# Patient Record
Sex: FEMALE | Race: WHITE | NOT HISPANIC OR LATINO | Employment: OTHER | URBAN - METROPOLITAN AREA
[De-identification: names, ages, dates, MRNs, and addresses within clinical notes are randomized per-mention and may not be internally consistent; named-entity substitution may affect disease eponyms.]

---

## 2021-02-12 ENCOUNTER — IMMUNIZATIONS (OUTPATIENT)
Dept: FAMILY MEDICINE CLINIC | Facility: HOSPITAL | Age: 86
End: 2021-02-12

## 2021-02-12 DIAGNOSIS — Z23 ENCOUNTER FOR IMMUNIZATION: Primary | ICD-10-CM

## 2021-02-12 PROCEDURE — 0011A SARS-COV-2 / COVID-19 MRNA VACCINE (MODERNA) 100 MCG: CPT

## 2021-02-12 PROCEDURE — 91301 SARS-COV-2 / COVID-19 MRNA VACCINE (MODERNA) 100 MCG: CPT

## 2021-03-10 ENCOUNTER — IMMUNIZATIONS (OUTPATIENT)
Dept: FAMILY MEDICINE CLINIC | Facility: HOSPITAL | Age: 86
End: 2021-03-10

## 2021-03-10 DIAGNOSIS — Z23 ENCOUNTER FOR IMMUNIZATION: Primary | ICD-10-CM

## 2021-03-10 PROCEDURE — 91301 SARS-COV-2 / COVID-19 MRNA VACCINE (MODERNA) 100 MCG: CPT

## 2021-03-10 PROCEDURE — 0012A SARS-COV-2 / COVID-19 MRNA VACCINE (MODERNA) 100 MCG: CPT

## 2021-06-07 ENCOUNTER — APPOINTMENT (EMERGENCY)
Dept: RADIOLOGY | Facility: HOSPITAL | Age: 86
End: 2021-06-07
Payer: MEDICARE

## 2021-06-07 ENCOUNTER — APPOINTMENT (EMERGENCY)
Dept: CT IMAGING | Facility: HOSPITAL | Age: 86
End: 2021-06-07
Payer: MEDICARE

## 2021-06-07 ENCOUNTER — HOSPITAL ENCOUNTER (EMERGENCY)
Facility: HOSPITAL | Age: 86
Discharge: HOME | End: 2021-06-07
Attending: EMERGENCY MEDICINE | Admitting: EMERGENCY MEDICINE
Payer: MEDICARE

## 2021-06-07 VITALS
OXYGEN SATURATION: 98 % | RESPIRATION RATE: 15 BRPM | SYSTOLIC BLOOD PRESSURE: 158 MMHG | TEMPERATURE: 98.6 F | HEIGHT: 57 IN | BODY MASS INDEX: 25.45 KG/M2 | WEIGHT: 117.95 LBS | DIASTOLIC BLOOD PRESSURE: 78 MMHG | HEART RATE: 73 BPM

## 2021-06-07 DIAGNOSIS — R10.9 FLANK PAIN: Primary | ICD-10-CM

## 2021-06-07 LAB
ALBUMIN SERPL BCP-MCNC: 3.3 G/DL (ref 3.5–5)
ALP SERPL-CCNC: 105 U/L (ref 46–116)
ALT SERPL W P-5'-P-CCNC: 24 U/L (ref 12–78)
ANION GAP SERPL CALCULATED.3IONS-SCNC: 10 MMOL/L (ref 4–13)
AST SERPL W P-5'-P-CCNC: 28 U/L (ref 5–45)
BASOPHILS # BLD AUTO: 0.02 THOUSANDS/ΜL (ref 0–0.1)
BASOPHILS NFR BLD AUTO: 0 % (ref 0–1)
BILIRUB SERPL-MCNC: 0.44 MG/DL (ref 0.2–1)
BILIRUB UR QL STRIP: NEGATIVE
BUN SERPL-MCNC: 50 MG/DL (ref 5–25)
CALCIUM ALBUM COR SERPL-MCNC: 11.3 MG/DL (ref 8.3–10.1)
CALCIUM SERPL-MCNC: 10.7 MG/DL (ref 8.3–10.1)
CHLORIDE SERPL-SCNC: 101 MMOL/L (ref 100–108)
CLARITY UR: NORMAL
CO2 SERPL-SCNC: 26 MMOL/L (ref 21–32)
COLOR UR: NORMAL
CREAT SERPL-MCNC: 1.28 MG/DL (ref 0.6–1.3)
EOSINOPHIL # BLD AUTO: 0 THOUSAND/ΜL (ref 0–0.61)
EOSINOPHIL NFR BLD AUTO: 0 % (ref 0–6)
ERYTHROCYTE [DISTWIDTH] IN BLOOD BY AUTOMATED COUNT: 13.7 % (ref 11.6–15.1)
GFR SERPL CREATININE-BSD FRML MDRD: 37 ML/MIN/1.73SQ M
GLUCOSE SERPL-MCNC: 88 MG/DL (ref 65–140)
GLUCOSE UR STRIP-MCNC: NEGATIVE MG/DL
HCT VFR BLD AUTO: 42.1 % (ref 34.8–46.1)
HGB BLD-MCNC: 14.1 G/DL (ref 11.5–15.4)
HGB UR QL STRIP.AUTO: NEGATIVE
IMM GRANULOCYTES # BLD AUTO: 0.11 THOUSAND/UL (ref 0–0.2)
IMM GRANULOCYTES NFR BLD AUTO: 1 % (ref 0–2)
KETONES UR STRIP-MCNC: NEGATIVE MG/DL
LEUKOCYTE ESTERASE UR QL STRIP: NEGATIVE
LIPASE SERPL-CCNC: 59 U/L (ref 73–393)
LYMPHOCYTES # BLD AUTO: 1.46 THOUSANDS/ΜL (ref 0.6–4.47)
LYMPHOCYTES NFR BLD AUTO: 10 % (ref 14–44)
MAGNESIUM SERPL-MCNC: 2.2 MG/DL (ref 1.6–2.6)
MCH RBC QN AUTO: 32.2 PG (ref 26.8–34.3)
MCHC RBC AUTO-ENTMCNC: 33.5 G/DL (ref 31.4–37.4)
MCV RBC AUTO: 96 FL (ref 82–98)
MONOCYTES # BLD AUTO: 2.34 THOUSAND/ΜL (ref 0.17–1.22)
MONOCYTES NFR BLD AUTO: 15 % (ref 4–12)
NEUTROPHILS # BLD AUTO: 11.5 THOUSANDS/ΜL (ref 1.85–7.62)
NEUTS SEG NFR BLD AUTO: 74 % (ref 43–75)
NITRITE UR QL STRIP: NEGATIVE
NRBC BLD AUTO-RTO: 0 /100 WBCS
NT-PROBNP SERPL-MCNC: 1148 PG/ML
PH UR STRIP.AUTO: 6.5 [PH]
PLATELET # BLD AUTO: 284 THOUSANDS/UL (ref 149–390)
PMV BLD AUTO: 10.8 FL (ref 8.9–12.7)
POTASSIUM SERPL-SCNC: 3.6 MMOL/L (ref 3.5–5.3)
PROT SERPL-MCNC: 8.1 G/DL (ref 6.4–8.2)
PROT UR STRIP-MCNC: NEGATIVE MG/DL
RBC # BLD AUTO: 4.38 MILLION/UL (ref 3.81–5.12)
SODIUM SERPL-SCNC: 137 MMOL/L (ref 136–145)
SP GR UR STRIP.AUTO: <=1.005 (ref 1–1.03)
TROPONIN I SERPL-MCNC: <0.02 NG/ML
UROBILINOGEN UR QL STRIP.AUTO: 0.2 E.U./DL
WBC # BLD AUTO: 15.43 THOUSAND/UL (ref 4.31–10.16)

## 2021-06-07 PROCEDURE — 80053 COMPREHEN METABOLIC PANEL: CPT | Performed by: EMERGENCY MEDICINE

## 2021-06-07 PROCEDURE — 74177 CT ABD & PELVIS W/CONTRAST: CPT

## 2021-06-07 PROCEDURE — 96374 THER/PROPH/DIAG INJ IV PUSH: CPT

## 2021-06-07 PROCEDURE — 99284 EMERGENCY DEPT VISIT MOD MDM: CPT

## 2021-06-07 PROCEDURE — 83735 ASSAY OF MAGNESIUM: CPT | Performed by: EMERGENCY MEDICINE

## 2021-06-07 PROCEDURE — 83880 ASSAY OF NATRIURETIC PEPTIDE: CPT | Performed by: EMERGENCY MEDICINE

## 2021-06-07 PROCEDURE — 99284 EMERGENCY DEPT VISIT MOD MDM: CPT | Performed by: EMERGENCY MEDICINE

## 2021-06-07 PROCEDURE — 85025 COMPLETE CBC W/AUTO DIFF WBC: CPT | Performed by: EMERGENCY MEDICINE

## 2021-06-07 PROCEDURE — 84484 ASSAY OF TROPONIN QUANT: CPT | Performed by: EMERGENCY MEDICINE

## 2021-06-07 PROCEDURE — 83690 ASSAY OF LIPASE: CPT | Performed by: EMERGENCY MEDICINE

## 2021-06-07 PROCEDURE — 81003 URINALYSIS AUTO W/O SCOPE: CPT | Performed by: EMERGENCY MEDICINE

## 2021-06-07 PROCEDURE — 36415 COLL VENOUS BLD VENIPUNCTURE: CPT | Performed by: EMERGENCY MEDICINE

## 2021-06-07 PROCEDURE — 71045 X-RAY EXAM CHEST 1 VIEW: CPT

## 2021-06-07 RX ORDER — AMOXICILLIN 250 MG
1 CAPSULE ORAL DAILY
Qty: 20 TABLET | Refills: 0 | Status: SHIPPED | OUTPATIENT
Start: 2021-06-07 | End: 2021-07-12

## 2021-06-07 RX ORDER — DOCUSATE SODIUM 100 MG/1
100 CAPSULE, LIQUID FILLED ORAL EVERY 12 HOURS
Qty: 60 CAPSULE | Refills: 0 | Status: SHIPPED | OUTPATIENT
Start: 2021-06-07 | End: 2021-07-12

## 2021-06-07 RX ORDER — MORPHINE SULFATE 4 MG/ML
4 INJECTION, SOLUTION INTRAMUSCULAR; INTRAVENOUS ONCE
Status: COMPLETED | OUTPATIENT
Start: 2021-06-07 | End: 2021-06-07

## 2021-06-07 RX ADMIN — MORPHINE SULFATE 4 MG: 4 INJECTION INTRAVENOUS at 12:52

## 2021-06-07 RX ADMIN — IOHEXOL 100 ML: 350 INJECTION, SOLUTION INTRAVENOUS at 13:58

## 2021-06-07 NOTE — ED PROVIDER NOTES
History  Chief Complaint   Patient presents with    Back Pain     started 3 days ago when removing a pillow from the couch     Leg Swelling     edema on b/l LL started a couple weeks ago     Patient is an 20-year-old female past medical history of hypertension, CKD stage 3, depression presenting with flank pain  Patient notes the last 5 days she has had constant and worsening nonradiating flank pain  She cannot describe the nature of a but states that it is severe  States that is worse with movement worse with going over bumps, has not resolved with use of tramadol and Tylenol  She states it began while moving a pillow on the couch  Denies any falls or injuries that she is aware of  She also notes bilateral leg edema and states that it started on the right and is now in both legs over the last several days  She notes constipation but states that she moved her bowels this morning uses MiraLax daily  She denies any dysuria, hematuria, urinary frequency, urgency, nausea/vomiting/diarrhea, fevers, chest pain, shortness breath, dizziness, numbness or tingling, lower extremity weakness  None       History reviewed  No pertinent past medical history  History reviewed  No pertinent surgical history  History reviewed  No pertinent family history  I have reviewed and agree with the history as documented  E-Cigarette/Vaping     E-Cigarette/Vaping Substances     Social History     Tobacco Use    Smoking status: Never Smoker    Smokeless tobacco: Never Used   Substance Use Topics    Alcohol use: Never     Frequency: Never     Binge frequency: Never    Drug use: Never       Review of Systems   All other systems reviewed and are negative  Physical Exam  Physical Exam  Vitals signs reviewed  Constitutional:       General: She is not in acute distress  Appearance: Normal appearance  She is not ill-appearing  HENT:      Mouth/Throat:      Mouth: Mucous membranes are dry     Eyes: Conjunctiva/sclera: Conjunctivae normal    Neck:      Musculoskeletal: Neck supple  Cardiovascular:      Rate and Rhythm: Normal rate and regular rhythm  Heart sounds: Normal heart sounds  Pulmonary:      Effort: Pulmonary effort is normal       Breath sounds: Normal breath sounds  Abdominal:      General: Abdomen is flat  Palpations: Abdomen is soft  Tenderness: There is abdominal tenderness  There is right CVA tenderness and guarding  Comments: Guarding with deep palpation of the bilateral upper quadrants and epigastrium   Musculoskeletal: Normal range of motion  General: Swelling present  Comments: No spinal tenderness to the cervical, thoracic, lumbar spines trace to +1 pitting edema to the lower extremities bilaterally   Skin:     General: Skin is warm and dry  Neurological:      General: No focal deficit present  Mental Status: She is alert     Psychiatric:         Mood and Affect: Mood normal          Vital Signs  ED Triage Vitals [06/07/21 1209]   Temperature Pulse Respirations Blood Pressure SpO2   98 6 °F (37 °C) 81 20 (!) 181/87 97 %      Temp Source Heart Rate Source Patient Position - Orthostatic VS BP Location FiO2 (%)   Oral Monitor Sitting Right arm --      Pain Score       7           Vitals:    06/07/21 1209 06/07/21 1315   BP: (!) 181/87 158/78   Pulse: 81 73   Patient Position - Orthostatic VS: Sitting Lying         Visual Acuity      ED Medications  Medications   morphine (PF) 4 mg/mL injection 4 mg (4 mg Intravenous Given 6/7/21 1252)   iohexol (OMNIPAQUE) 350 MG/ML injection (SINGLE-DOSE) 100 mL (100 mL Intravenous Given 6/7/21 1358)       Diagnostic Studies  Results Reviewed     Procedure Component Value Units Date/Time    UA w Reflex to Microscopic w Reflex to Culture [[id number]] Collected: 06/07/21 1539    Lab Status: Final result Specimen: Urine, Clean Catch Updated: 06/07/21 5426     Color, UA Light Yellow     Clarity, UA Slightly Cloudy Specific Gravity, UA <=1 005     pH, UA 6 5     Leukocytes, UA Negative     Nitrite, UA Negative     Protein, UA Negative mg/dl      Glucose, UA Negative mg/dl      Ketones, UA Negative mg/dl      Urobilinogen, UA 0 2 E U /dl      Bilirubin, UA Negative     Blood, UA Negative    Lipase [[id number]]  (Abnormal) Collected: 06/07/21 1252    Lab Status: Final result Specimen: Blood from Arm, Left Updated: 06/07/21 1330     Lipase 59 u/L     NT-BNP PRO [[id number]]  (Abnormal) Collected: 06/07/21 1252    Lab Status: Final result Specimen: Blood from Arm, Left Updated: 06/07/21 1330     NT-proBNP 1,148 pg/mL     Magnesium [[id number]]  (Normal) Collected: 06/07/21 1252    Lab Status: Final result Specimen: Blood from Arm, Left Updated: 06/07/21 1330     Magnesium 2 2 mg/dL     Troponin I [[id number]]  (Normal) Collected: 06/07/21 1252    Lab Status: Final result Specimen: Blood from Arm, Left Updated: 06/07/21 1324     Troponin I <0 02 ng/mL     Comprehensive metabolic panel [[id number]]  (Abnormal) Collected: 06/07/21 1252    Lab Status: Final result Specimen: Blood from Arm, Left Updated: 06/07/21 1322     Sodium 137 mmol/L      Potassium 3 6 mmol/L      Chloride 101 mmol/L      CO2 26 mmol/L      ANION GAP 10 mmol/L      BUN 50 mg/dL      Creatinine 1 28 mg/dL      Glucose 88 mg/dL      Calcium 10 7 mg/dL      Corrected Calcium 11 3 mg/dL      AST 28 U/L      ALT 24 U/L      Alkaline Phosphatase 105 U/L      Total Protein 8 1 g/dL      Albumin 3 3 g/dL      Total Bilirubin 0 44 mg/dL      eGFR 37 ml/min/1 73sq m     [facility]:      [employer] guidelines for Chronic Kidney Disease (CKD):     Stage 1 with normal or high GFR (GFR > 90 mL/min/1 73 square meters)    Stage 2 Mild CKD (GFR = 60-89 mL/min/1 73 square meters)    Stage 3A Moderate CKD (GFR = 45-59 mL/min/1 73 square meters)    Stage 3B Moderate CKD (GFR = 30-44 mL/min/1 73 square meters)    Stage 4 Severe CKD (GFR = 15-29 mL/min/1 73 square meters)    Stage 5 End Stage CKD (GFR <15 mL/min/1 73 square meters)  Note: GFR calculation is accurate only with a steady state creatinine    CBC and differential [[id number]]  (Abnormal) Collected: 06/07/21 1252    Lab Status: Final result Specimen: Blood from Arm, Left Updated: 06/07/21 1259     WBC 15 43 Thousand/uL      RBC 4 38 Million/uL      Hemoglobin 14 1 g/dL      Hematocrit 42 1 %      MCV 96 fL      MCH 32 2 pg      MCHC 33 5 g/dL      RDW 13 7 %      MPV 10 8 fL      Platelets 572 Thousands/uL      nRBC 0 /100 WBCs      Neutrophils Relative 74 %      Immat GRANS % 1 %      Lymphocytes Relative 10 %      Monocytes Relative 15 %      Eosinophils Relative 0 %      Basophils Relative 0 %      Neutrophils Absolute 11 50 Thousands/µL      Immature Grans Absolute 0 11 Thousand/uL      Lymphocytes Absolute 1 46 Thousands/µL      Monocytes Absolute 2 34 Thousand/µL      Eosinophils Absolute 0 00 Thousand/µL      Basophils Absolute 0 02 Thousands/µL                  CT abdomen pelvis with contrast   Final Result by Alvarado Hernandes MD (06/07 0112)      1  No acute inflammatory process in the abdomen or pelvis  2   Liquid stool and throughout portions of the colon without obstruction or wall thickening  Correlation for diarrhea illness  3   Fistulous tracts between loops of sigmoid colon and rectum without acute inflammatory changes  This may represent the sequela of prior diverticulitis or inflammatory bowel disease  4   Small hiatal hernia              Workstation performed: [id number]         XR chest 1 view portable   ED Interpretation by Sandra Ruiz DO (06/07 1600)   NAD                 Procedures  ECG 12 Lead Documentation Only    Date/Time: 6/7/2021 1:13 PM  Performed by: Sandra Ruiz DO  Authorized by: Sandra Ruiz DO     ECG reviewed by me, the ED Provider: yes    Patient location:  ED  Previous ECG:     Previous ECG:  Unavailable  Interpretation: Interpretation: normal    Rate:     ECG rate assessment: normal    Rhythm:     Rhythm: sinus rhythm    Ectopy:     Ectopy: none    QRS:     QRS axis:  Normal    QRS intervals:  Normal  Conduction:     Conduction: normal    ST segments:     ST segments:  Normal  T waves:     T waves: normal               ED Course  ED Course as of Jun 07 1620   Mon Jun 07, 2021   1555 Urinalysis unremarkable, CT shows  constipation and rectosigmoid fistula, will discuss with surgery      1559 Patient does have elevated BNP but has history of heart failure, reports no chest pain or shortness of breath, has only mild lower extremity edema, and normal vital signs  Have advised PCP follow-up and Cardiology follow-up for this and patient states she understands and agrees  80 Have discussed with surgery who recommends outpatient colonoscopy and follow-up with them as needed after colonoscopy  Have discussed with patient return precautions worsened or changed pain, uncontrollable vomiting, fevers, lightheadedness or passing out and she states that she understands  Will give bowel regimen gastroenterology, PCP follow-up  Have discussed with patient that she should return to the ER if her symptoms are worsening and must follow-up with her primary care this week as she does not have a diagnosis explaining her right flank pain  Identification of Seniors at Risk      Most Recent Value   (ISAR) Identification of Seniors at Risk   Before the illness or injury that brought you to the Emergency, did you need someone to help you on a regular basis? 0 Filed at: 06/07/2021 1213   In the last 24 hours, have you needed more help than usual?  0 Filed at: 06/07/2021 1213   Have you been hospitalized for one or more nights during the past 6 months? 0 Filed at: 06/07/2021 1213   In general, do you see well?  0 Filed at: 06/07/2021 1213   In general, do you have serious problems with your memory?   0 Filed at: 06/07/2021 1213   Do you take more than three different medications every day? 1 Filed at: 06/07/2021 1213   ISAR Score  1 Filed at: 06/07/2021 1213                    SBIRT 20yo+      Most Recent Value   SBIRT (25 yo +)   In order to provide better care to our patients, we are screening all of our patients for alcohol and drug use  Would it be okay to ask you these screening questions? Yes Filed at: 06/07/2021 1213   Initial Alcohol Screen: US AUDIT-C    1  How often do you have a drink containing alcohol?  0 Filed at: 06/07/2021 1213   2  How many drinks containing alcohol do you have on a typical day you are drinking? 0 Filed at: 06/07/2021 1213   3a  Male UNDER 65: How often do you have five or more drinks on one occasion? 0 Filed at: 06/07/2021 1213   3b  FEMALE Any Age, or MALE 65+: How often do you have 4 or more drinks on one occassion? 0 Filed at: 06/07/2021 1213   Audit-C Score  0 Filed at: 06/07/2021 1213   BARRETT: How many times in the past year have you    Used an illegal drug or used a prescription medication for non-medical reasons? Never Filed at: 06/07/2021 1213                    MDM  Number of Diagnoses or Management Options  Diagnosis management comments: Patient is an 30-year-old female past medical history of CKD stage 3, hypertension, depression presenting with back pain  Patient is well-appearing bedside with stable vitals and in no acute distress  She is hypertensive to 181/87 but notes noncompliance with her blood pressure medication today and does not note any symptoms of hypertensive emergency  She does have reproducible right-sided CVA tenderness as well as abdominal tenderness but no spinal tenderness and denies any lower extremity weakness, numbness or radiation of pain  Will obtain urinalysis, labs, CT abdomen pelvis and administer pain control  Will also obtain cardiac workup in the setting of bilateral leg edema        Disposition  Final diagnoses:   Flank pain     Time reflects when diagnosis was documented in both MDM as applicable and the Disposition within this note     Time User Action Codes Description Comment    6/7/2021  4:18 PM Kelby Sanders Add [R10 9] Flank pain       ED Disposition     ED Disposition Condition Date/Time Comment    Discharge Stable Mon Jun 7, 2021  4:18 PM Jovanna Singer discharge to home/self care  Follow-up Information     Follow up With Specialties Details Why Contact Info Additional Information    Ladonna Garcia MD  In 1 week  [address] [facility] [id number] [place] [address] [facility] Gastroenterology Schedule an appointment as soon as possible for a visit in 1 week  [address]  [address]  [address] [facility], [address]  [address], [facility], [place], [address] [phone]          Patient's Medications   Discharge Prescriptions    SENNA-DOCUSATE SODIUM (SENOKOT S) 8 6-50 MG PER TABLET    Take 1 tablet by mouth daily       Start Date: 6/7/2021  End Date: --       Order Dose: 1 tablet       Quantity: 20 tablet    Refills: 0     No discharge procedures on file      PDMP Review     None          ED Provider  Electronically Signed by           Scot Forman DO  06/07/21 4752

## 2021-06-07 NOTE — ED NOTES
Family at bedside  Pt assisted to stand, pivot to wheelchair  Taken to car and assisted in       Risa Antunez RN  06/07/21 9742

## 2021-07-09 RX ORDER — ATORVASTATIN CALCIUM 20 MG/1
TABLET, FILM COATED ORAL
COMMUNITY
Start: 2020-12-12

## 2021-07-09 RX ORDER — ALPRAZOLAM 0.25 MG/1
TABLET ORAL
COMMUNITY
Start: 2021-03-17 | End: 2021-08-26

## 2021-07-09 RX ORDER — SILICONE ADHESIVE 1.5" X 3"
SHEET (EA) TOPICAL
COMMUNITY
Start: 2021-01-13 | End: 2021-07-12

## 2021-07-09 RX ORDER — RANITIDINE 150 MG/1
TABLET ORAL
COMMUNITY
End: 2021-07-12

## 2021-07-09 RX ORDER — ACETAMINOPHEN 325 MG/1
325 TABLET ORAL
COMMUNITY
End: 2021-09-04 | Stop reason: HOSPADM

## 2021-07-09 RX ORDER — TRAMADOL HYDROCHLORIDE 50 MG/1
50 TABLET ORAL
COMMUNITY
Start: 2021-06-04 | End: 2021-09-04 | Stop reason: HOSPADM

## 2021-07-12 ENCOUNTER — OFFICE VISIT (OUTPATIENT)
Dept: GASTROENTEROLOGY | Facility: CLINIC | Age: 86
End: 2021-07-12
Payer: MEDICARE

## 2021-07-12 VITALS
SYSTOLIC BLOOD PRESSURE: 84 MMHG | BODY MASS INDEX: 25.52 KG/M2 | DIASTOLIC BLOOD PRESSURE: 64 MMHG | HEIGHT: 57 IN | HEART RATE: 96 BPM

## 2021-07-12 DIAGNOSIS — K21.9 GASTROESOPHAGEAL REFLUX DISEASE, UNSPECIFIED WHETHER ESOPHAGITIS PRESENT: ICD-10-CM

## 2021-07-12 DIAGNOSIS — K59.04 CHRONIC IDIOPATHIC CONSTIPATION: ICD-10-CM

## 2021-07-12 DIAGNOSIS — R10.84 GENERALIZED ABDOMINAL PAIN: Primary | ICD-10-CM

## 2021-07-12 DIAGNOSIS — R93.5 ABNORMAL CT OF THE ABDOMEN: ICD-10-CM

## 2021-07-12 PROCEDURE — 99204 OFFICE O/P NEW MOD 45 MIN: CPT | Performed by: PHYSICIAN ASSISTANT

## 2021-07-12 RX ORDER — DICYCLOMINE HCL 20 MG
20 TABLET ORAL EVERY 6 HOURS PRN
Qty: 60 TABLET | Refills: 3 | Status: SHIPPED | OUTPATIENT
Start: 2021-07-12 | End: 2021-07-19

## 2021-07-12 RX ORDER — POLYETHYLENE GLYCOL 3350 17 G/17G
17 POWDER, FOR SOLUTION ORAL DAILY
Qty: 30 EACH | Refills: 2 | Status: SHIPPED | OUTPATIENT
Start: 2021-07-12

## 2021-07-12 RX ORDER — PREDNISONE 20 MG/1
TABLET ORAL
COMMUNITY
Start: 2021-06-04 | End: 2021-07-12

## 2021-07-12 RX ORDER — POTASSIUM CHLORIDE 750 MG/1
10 TABLET, FILM COATED, EXTENDED RELEASE ORAL DAILY
COMMUNITY
Start: 2021-07-08

## 2021-07-12 RX ORDER — LOSARTAN POTASSIUM 50 MG/1
50 TABLET ORAL DAILY
COMMUNITY
Start: 2021-05-26

## 2021-07-12 RX ORDER — SENNA PLUS 8.6 MG/1
1 TABLET ORAL
COMMUNITY
End: 2021-07-12

## 2021-07-12 RX ORDER — CYCLOBENZAPRINE HCL 10 MG
TABLET ORAL
COMMUNITY
Start: 2021-06-08 | End: 2021-08-26

## 2021-07-12 RX ORDER — DOCUSATE SODIUM 100 MG/1
CAPSULE, LIQUID FILLED ORAL
Qty: 60 CAPSULE | Refills: 0 | Status: ON HOLD | OUTPATIENT
Start: 2021-07-12 | End: 2021-09-04 | Stop reason: CLARIF

## 2021-07-12 RX ORDER — DICYCLOMINE HCL 20 MG
20 TABLET ORAL EVERY 6 HOURS PRN
Qty: 60 TABLET | Refills: 3 | Status: SHIPPED | OUTPATIENT
Start: 2021-07-12 | End: 2021-07-12 | Stop reason: SDUPTHER

## 2021-07-12 RX ORDER — POLYETHYLENE GLYCOL 3350 17 G/17G
17 POWDER, FOR SOLUTION ORAL DAILY
Qty: 30 EACH | Refills: 2 | Status: SHIPPED | OUTPATIENT
Start: 2021-07-12 | End: 2021-07-12 | Stop reason: SDUPTHER

## 2021-07-12 RX ORDER — SUCRALFATE 1 G/1
TABLET ORAL
COMMUNITY
Start: 2021-06-29

## 2021-07-12 RX ORDER — DOCUSATE SODIUM 100 MG/1
CAPSULE, LIQUID FILLED ORAL
Qty: 60 CAPSULE | Refills: 0 | Status: SHIPPED | OUTPATIENT
Start: 2021-07-12 | End: 2021-07-12 | Stop reason: SDUPTHER

## 2021-07-12 RX ORDER — PANTOPRAZOLE SODIUM 40 MG/1
TABLET, DELAYED RELEASE ORAL
COMMUNITY
Start: 2021-05-29

## 2021-07-12 NOTE — PROGRESS NOTES
[facility] - Outpatient Consultation  Mario Zapata 80 y o  female MRN: [id number]  Encounter: [id number]          ASSESSMENT AND PLAN:      1  Generalized abdominal pain  2  Chronic idiopathic constipation  3  Gastroesophageal reflux disease, unspecified whether esophagitis present  4  Abnormal CT of the abdomen  CT showed liquid stool in the colon, colorectal fistula, rectal distension and diverticulosis without acute inflammatory changes  There was also suggestion of a compression fracture at L1  Will plan a colonoscopy    For her constipation continue colace and add Miralax  Use dicyclomine prn abdominal pain  Continue pantoprazole as it seems to be helping her abdominal pain  F/U with Dr Ivan Holder regarding possible compression fracture  ___________________________________________________________________    HPI:  80-year-old female presents for evaluation of abdominal pain, constipation and abnormal CT scan  The patient developed an acute onset of severe left flank and back pain approximately 3 months ago  She went to the [facility] Emergency Room on June 7th due to the symptom  A CT of the abdomen was performed which suggested liquid stool throughout the colon without obstruction, 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, diverticulosis, and age indeterminate compression fracture of L1 and a small hiatal hernia  She was discharged with instructions to follow up with Gastroenterology  Unfortunately, overnight her pain worsened and she decided to present to the [facility] emergency room  She was admitted to [facility] overnight  It is very unclear exactly what evaluation she had wall there but she was discharged to [facility] where she stayed for 2 weeks to receive physical therapy  After this she was sent back home where she continues with severe back pain  She  Does admit to cramping abdominal pain after eating    She reports that she is not eating well due to this   She has some nausea but no vomiting  She suffers with chronic constipation  She is currently taking Colace without relief  Her  reports that she took a dose of MiraLax several days ago which did produce results however she has not taken a 2nd dose  The patient reports this is because it causes abdominal pain  She was recently prescribed senna which she has been taking without relief  There is no rectal bleeding or diarrhea  She has lost weight but neither her nor her  can not quantify the amount  She reports that her last colonoscopy was approximately 30 years ago  She has known that she has diverticulosis but does not think she was ever diagnosed with diverticulitis  REVIEW OF SYSTEMS:    CONSTITUTIONAL: Denies any fever, chills, rigors, and weight loss  HEENT: No earache or tinnitus  Denies hearing loss or visual disturbances  CARDIOVASCULAR: No chest pain or palpitations  RESPIRATORY: Denies any cough, hemoptysis, shortness of breath or dyspnea on exertion  GASTROINTESTINAL: As noted in the History of Present Illness  GENITOURINARY: No problems with urination  Denies any hematuria or dysuria  NEUROLOGIC: No dizziness or vertigo, denies headaches  MUSCULOSKELETAL: Denies any muscle or joint pain  SKIN: Denies skin rashes or itching  ENDOCRINE: Denies excessive thirst  Denies intolerance to heat or cold  PSYCHOSOCIAL: Denies depression or anxiety  Denies any recent memory loss  Historical Information   History reviewed  No pertinent past medical history    Past Surgical History:   Procedure Laterality Date    APPENDECTOMY      CHOLECYSTECTOMY      INCISION AND DRAINAGE RETROPHYARYNGEAL ABCESS      PELVIC FRACTURE SURGERY      TOTAL HIP ARTHROPLASTY       Social History   Social History     Substance and Sexual Activity   Alcohol Use Never     Social History     Substance and Sexual Activity   Drug Use Never     Social History     Tobacco Use   Smoking Status Never Smoker   Smokeless Tobacco Never Used     History reviewed  No pertinent family history  Meds/Allergies       Current Outpatient Medications:     ALPRAZolam (XANAX) 0 25 mg tablet    atorvastatin (LIPITOR) 20 mg tablet    losartan (COZAAR) 50 mg tablet    pantoprazole (PROTONIX) 40 mg tablet    potassium chloride (Klor-Con) 10 mEq tablet    sucralfate (CARAFATE) 1 g tablet    traMADol (ULTRAM) 50 mg tablet    acetaminophen (TYLENOL) 325 mg tablet    cyclobenzaprine (FLEXERIL) 10 mg tablet    dicyclomine (BENTYL) 20 mg tablet    docusate sodium (COLACE) 100 mg capsule    polyethylene glycol (MIRALAX) 17 g packet    Zoster Vac Recomb Adjuvanted 50 MCG/0 5ML SUSR    Allergies   Allergen Reactions    Aloe Other (See Comments)     Fresh fruit has reaction to   She can have canned fruit    Banana - Food Allergy Other (See Comments)     Itchy throat    Naproxen Diarrhea    Oxycodone-Acetaminophen Hives           Objective     Blood pressure (!) 84/64, pulse 96, height 4' 9" (1 448 m)  Body mass index is 25 52 kg/m²  PHYSICAL EXAM:      General Appearance:   Alert, cooperative, no distress   HEENT:   Normocephalic, atraumatic, anicteric      Neck:  Supple, symmetrical, trachea midline   Lungs:   Clear to auscultation bilaterally; no rales, rhonchi or wheezing; respirations unlabored    Heart[de-identified]   Regular rate and rhythm; no murmur, rub, or gallop  Abdomen:   Soft, non-tender, non-distended; normal bowel sounds; no masses, no organomegaly    Genitalia:   Deferred    Rectal:   Deferred    Extremities:  No cyanosis, clubbing or edema    Pulses:  2+ and symmetric    Skin:  No jaundice, rashes, or lesions    Lymph nodes:  No palpable cervical lymphadenopathy        Lab Results:   No visits with results within 1 Day(s) from this visit     Latest known visit with results is:   Admission on 06/07/2021, Discharged on 06/07/2021   Component Date Value    Sodium 06/07/2021 137     Potassium 06/07/2021 3 6     Chloride 06/07/2021 101     CO2 06/07/2021 26     ANION GAP 06/07/2021 10     BUN 06/07/2021 50*    Creatinine 06/07/2021 1 28     Glucose 06/07/2021 88     Calcium 06/07/2021 10 7*    Corrected Calcium 06/07/2021 11 3*    AST 06/07/2021 28     ALT 06/07/2021 24     Alkaline Phosphatase 06/07/2021 105     Total Protein 06/07/2021 8 1     Albumin 06/07/2021 3 3*    Total Bilirubin 06/07/2021 0 44     eGFR 06/07/2021 37     WBC 06/07/2021 15 43*    RBC 06/07/2021 4 38     Hemoglobin 06/07/2021 14 1     Hematocrit 06/07/2021 42 1     MCV 06/07/2021 96     MCH 06/07/2021 32 2     MCHC 06/07/2021 33 5     RDW 06/07/2021 13 7     MPV 06/07/2021 10 8     Platelets 41/04/2065 284     nRBC 06/07/2021 0     Neutrophils Relative 06/07/2021 74     Immat GRANS % 06/07/2021 1     Lymphocytes Relative 06/07/2021 10*    Monocytes Relative 06/07/2021 15*    Eosinophils Relative 06/07/2021 0     Basophils Relative 06/07/2021 0     Neutrophils Absolute 06/07/2021 11 50*    Immature Grans Absolute 06/07/2021 0 11     Lymphocytes Absolute 06/07/2021 1 46     Monocytes Absolute 06/07/2021 2 34*    Eosinophils Absolute 06/07/2021 0 00     Basophils Absolute 06/07/2021 0 02     Lipase 06/07/2021 59*    Troponin I 06/07/2021 <0 02     NT-proBNP 06/07/2021 1,148*    Magnesium 06/07/2021 2 2     Color, UA 06/07/2021 Light Yellow     Clarity, UA 06/07/2021 Slightly Cloudy     Specific Gravity, UA 06/07/2021 <=1 005     pH, UA 06/07/2021 6 5     Leukocytes, UA 06/07/2021 Negative     Nitrite, UA 06/07/2021 Negative     Protein, UA 06/07/2021 Negative     Glucose, UA 06/07/2021 Negative     Ketones, UA 06/07/2021 Negative     Urobilinogen, UA 06/07/2021 0 2     Bilirubin, UA 06/07/2021 Negative     Blood, UA 06/07/2021 Negative          Radiology Results:   No results found

## 2021-07-13 ENCOUNTER — TELEPHONE (OUTPATIENT)
Dept: GASTROENTEROLOGY | Facility: CLINIC | Age: 86
End: 2021-07-13

## 2021-07-13 NOTE — TELEPHONE ENCOUNTER
Jacquie Seen patient - Patient has a procedure on 8/2 and  called to say they did not get the 4 pills needed   Would like Jacquie Seen to call patient at [phone]    Thx

## 2021-07-14 NOTE — TELEPHONE ENCOUNTER
Called pt and advised can get over the counter and it is called Ducolax  Pt said OK and also stated she is a little off

## 2021-07-19 DIAGNOSIS — R10.84 GENERALIZED ABDOMINAL PAIN: ICD-10-CM

## 2021-07-19 RX ORDER — DICYCLOMINE HCL 20 MG
TABLET ORAL
Qty: 60 TABLET | Refills: 3 | Status: SHIPPED | OUTPATIENT
Start: 2021-07-19 | End: 2021-07-27

## 2021-07-27 DIAGNOSIS — R10.84 GENERALIZED ABDOMINAL PAIN: ICD-10-CM

## 2021-07-27 RX ORDER — DICYCLOMINE HCL 20 MG
TABLET ORAL
Qty: 360 TABLET | Refills: 1 | Status: SHIPPED | OUTPATIENT
Start: 2021-07-27

## 2021-07-30 ENCOUNTER — TELEPHONE (OUTPATIENT)
Dept: GASTROENTEROLOGY | Facility: HOSPITAL | Age: 86
End: 2021-07-30

## 2021-08-02 ENCOUNTER — HOSPITAL ENCOUNTER (OUTPATIENT)
Dept: GASTROENTEROLOGY | Facility: HOSPITAL | Age: 86
Setting detail: OUTPATIENT SURGERY
Discharge: HOME | End: 2021-08-02
Payer: MEDICARE

## 2021-08-02 ENCOUNTER — ANESTHESIA (OUTPATIENT)
Dept: GASTROENTEROLOGY | Facility: HOSPITAL | Age: 86
End: 2021-08-02

## 2021-08-02 ENCOUNTER — ANESTHESIA EVENT (OUTPATIENT)
Dept: GASTROENTEROLOGY | Facility: HOSPITAL | Age: 86
End: 2021-08-02

## 2021-08-02 VITALS
SYSTOLIC BLOOD PRESSURE: 175 MMHG | OXYGEN SATURATION: 97 % | HEIGHT: 58 IN | HEART RATE: 64 BPM | WEIGHT: 110 LBS | DIASTOLIC BLOOD PRESSURE: 100 MMHG | BODY MASS INDEX: 23.09 KG/M2 | RESPIRATION RATE: 27 BRPM | TEMPERATURE: 97.7 F

## 2021-08-02 DIAGNOSIS — R93.5 ABNORMAL CT OF THE ABDOMEN: ICD-10-CM

## 2021-08-02 DIAGNOSIS — K59.04 CHRONIC IDIOPATHIC CONSTIPATION: ICD-10-CM

## 2021-08-02 DIAGNOSIS — R10.84 GENERALIZED ABDOMINAL PAIN: ICD-10-CM

## 2021-08-02 DIAGNOSIS — K21.9 GASTROESOPHAGEAL REFLUX DISEASE, UNSPECIFIED WHETHER ESOPHAGITIS PRESENT: ICD-10-CM

## 2021-08-02 PROBLEM — N18.30 CHRONIC KIDNEY DISEASE, STAGE 3 UNSPECIFIED (HCC): Status: ACTIVE | Noted: 2021-03-17

## 2021-08-02 PROBLEM — F32.0 CURRENT MILD EPISODE OF MAJOR DEPRESSIVE DISORDER WITHOUT PRIOR EPISODE (HCC): Status: ACTIVE | Noted: 2020-08-10

## 2021-08-02 PROCEDURE — 45378 DIAGNOSTIC COLONOSCOPY: CPT | Performed by: INTERNAL MEDICINE

## 2021-08-02 PROCEDURE — 43235 EGD DIAGNOSTIC BRUSH WASH: CPT | Performed by: INTERNAL MEDICINE

## 2021-08-02 RX ORDER — GABAPENTIN 100 MG/1
100 CAPSULE ORAL 2 TIMES DAILY
COMMUNITY

## 2021-08-02 RX ORDER — PROPOFOL 10 MG/ML
INJECTION, EMULSION INTRAVENOUS AS NEEDED
Status: DISCONTINUED | OUTPATIENT
Start: 2021-08-02 | End: 2021-08-02

## 2021-08-02 RX ORDER — LIDOCAINE HYDROCHLORIDE 10 MG/ML
INJECTION, SOLUTION EPIDURAL; INFILTRATION; INTRACAUDAL; PERINEURAL AS NEEDED
Status: DISCONTINUED | OUTPATIENT
Start: 2021-08-02 | End: 2021-08-02

## 2021-08-02 RX ORDER — SODIUM CHLORIDE, SODIUM LACTATE, POTASSIUM CHLORIDE, CALCIUM CHLORIDE 600; 310; 30; 20 MG/100ML; MG/100ML; MG/100ML; MG/100ML
125 INJECTION, SOLUTION INTRAVENOUS CONTINUOUS
Status: DISCONTINUED | OUTPATIENT
Start: 2021-08-02 | End: 2021-08-06 | Stop reason: HOSPADM

## 2021-08-02 RX ORDER — LIDOCAINE 50 MG/G
1 PATCH TOPICAL DAILY
COMMUNITY
End: 2021-09-04 | Stop reason: HOSPADM

## 2021-08-02 RX ADMIN — PROPOFOL 10 MG: 10 INJECTION, EMULSION INTRAVENOUS at 13:15

## 2021-08-02 RX ADMIN — PROPOFOL 50 MG: 10 INJECTION, EMULSION INTRAVENOUS at 13:00

## 2021-08-02 RX ADMIN — PROPOFOL 20 MG: 10 INJECTION, EMULSION INTRAVENOUS at 13:05

## 2021-08-02 RX ADMIN — PROPOFOL 10 MG: 10 INJECTION, EMULSION INTRAVENOUS at 13:23

## 2021-08-02 RX ADMIN — PROPOFOL 20 MG: 10 INJECTION, EMULSION INTRAVENOUS at 13:08

## 2021-08-02 RX ADMIN — PROPOFOL 10 MG: 10 INJECTION, EMULSION INTRAVENOUS at 13:11

## 2021-08-02 RX ADMIN — SODIUM CHLORIDE, SODIUM LACTATE, POTASSIUM CHLORIDE, AND CALCIUM CHLORIDE: .6; .31; .03; .02 INJECTION, SOLUTION INTRAVENOUS at 12:44

## 2021-08-02 RX ADMIN — PROPOFOL 10 MG: 10 INJECTION, EMULSION INTRAVENOUS at 13:27

## 2021-08-02 RX ADMIN — LIDOCAINE HYDROCHLORIDE 50 MG: 10 INJECTION, SOLUTION EPIDURAL; INFILTRATION; INTRACAUDAL; PERINEURAL at 13:00

## 2021-08-02 RX ADMIN — PROPOFOL 20 MG: 10 INJECTION, EMULSION INTRAVENOUS at 13:02

## 2021-08-02 NOTE — ANESTHESIA POSTPROCEDURE EVALUATION
Post-Op Assessment Note    CV Status:  Stable    Pain management: adequate     Mental Status:  Lethargic   Hydration Status:  Euvolemic   PONV Controlled:  Controlled   Airway Patency:  Patent      Post Op Vitals Reviewed: Yes      Staff: CRNA         No complications documented      BP  170/75   Temp   97 3   Pulse  78   Resp   18   SpO2   99

## 2021-08-02 NOTE — H&P
History and Physical -  [facility]  John Knight 80 y o  female MRN: [id number]      HPI: John Knight is a 80y o  year old female who presents for evaluation of abdominal pain, acid reflux, constipation      REVIEW OF SYSTEMS: Per the HPI, and otherwise unremarkable  Historical Information   No past medical history on file  Past Surgical History:   Procedure Laterality Date    APPENDECTOMY      CHOLECYSTECTOMY      INCISION AND DRAINAGE RETROPHYARYNGEAL ABCESS      PELVIC FRACTURE SURGERY      TOTAL HIP ARTHROPLASTY       Social History   Social History     Substance and Sexual Activity   Alcohol Use Never     Social History     Substance and Sexual Activity   Drug Use Never     Social History     Tobacco Use   Smoking Status Never Smoker   Smokeless Tobacco Never Used     No family history on file  Meds/Allergies     (Not in a hospital admission)      Allergies   Allergen Reactions    Aloe Other (See Comments)     Fresh fruit has reaction to   She can have canned fruit    Banana - Food Allergy Other (See Comments)     Itchy throat    Naproxen Diarrhea    Oxycodone-Acetaminophen Hives       Objective     Blood pressure (!) 153/118, pulse 88, temperature 97 7 °F (36 5 °C), temperature source Temporal, resp  rate 16, height 4' 10" (1 473 m), weight 49 9 kg (110 lb), SpO2 99 %  PHYSICAL EXAM    Gen: NAD  CV: RRR  CHEST: Clear  ABD: soft, NT/ND  EXT: no edema      ASSESSMENT/PLAN:  This is a 80y o  year old female here for EGD, colonoscopy, and she is stable and optimized for her procedure

## 2021-08-02 NOTE — ANESTHESIA PREPROCEDURE EVALUATION
Procedure:  COLONOSCOPY  EGD    Relevant Problems   CARDIO   (+) Essential hypertension with goal blood pressure less than 140/90   (+) Hyperlipidemia      /RENAL   (+) Chronic kidney disease, stage 3 unspecified (HCC)      NEURO/PSYCH   (+) Current mild episode of major depressive disorder without prior episode ([facility] [address] )   Per GI:   CT showed liquid stool in the colon, colorectal fistula, rectal distension and diverticulosis without acute inflammatory changes  There was also suggestion of a compression fracture at L1  Will plan a colonoscopy    Seen at [facility], recent PCP note reviewed  Pt was discharged from a rehab facility several months ago, but continues to have limited mobility and severe fatigue, in addition to back and flank pain  She was SOB moving to the stretcher, but her sats remained normal on RA  No evidence of anemia on recent labs  I recommended that she see a neurologist, as it does not seem that all her symptoms could be accounted for by a GI issue  Physical Exam    Airway    Mallampati score: III  TM Distance: >3 FB  Neck ROM: full     Dental   Comment: Denies loose teeth,     Cardiovascular  Cardiovascular exam normal    Pulmonary  Pulmonary exam normal     Other Findings  ---------------------------               08/02/21                      1217         ---------------------------   BP:        (!) 153/118      Pulse:         88           Resp:          16           Temp:   97 7 °F (36 5 °C)   SpO2:          99%         ---------------------------        Anesthesia Plan  ASA Score- 3     Anesthesia Type- IV sedation with anesthesia with ASA Monitors  Additional Monitors:   Airway Plan:           Plan Factors-    Chart reviewed  Existing labs reviewed  Patient summary reviewed  Patient is not a current smoker  Induction- intravenous  Postoperative Plan-     Informed Consent- Anesthetic plan and risks discussed with patient    I personally reviewed this patient with the CRNA  Discussed and agreed on the Anesthesia Plan with the CRNA  Samm Churchill

## 2021-08-25 ENCOUNTER — HOSPITAL ENCOUNTER (EMERGENCY)
Facility: HOSPITAL | Age: 86
Discharge: ANOTHER HOSPITAL | End: 2021-08-26
Attending: EMERGENCY MEDICINE | Admitting: EMERGENCY MEDICINE
Payer: MEDICARE

## 2021-08-25 ENCOUNTER — APPOINTMENT (EMERGENCY)
Dept: CT IMAGING | Facility: HOSPITAL | Age: 86
End: 2021-08-25
Payer: MEDICARE

## 2021-08-25 DIAGNOSIS — R55 SYNCOPE: Primary | ICD-10-CM

## 2021-08-25 DIAGNOSIS — E83.52 HYPERCALCEMIA: ICD-10-CM

## 2021-08-25 DIAGNOSIS — S32.000A LUMBAR COMPRESSION FRACTURE (HCC): ICD-10-CM

## 2021-08-25 DIAGNOSIS — S22.000A COMPRESSION FRACTURE OF BODY OF THORACIC VERTEBRA (HCC): ICD-10-CM

## 2021-08-25 LAB
ALBUMIN SERPL BCP-MCNC: 3 G/DL (ref 3.5–5)
ALP SERPL-CCNC: 240 U/L (ref 46–116)
ALT SERPL W P-5'-P-CCNC: 16 U/L (ref 12–78)
ANION GAP SERPL CALCULATED.3IONS-SCNC: 8 MMOL/L (ref 4–13)
APTT PPP: 28 SECONDS (ref 23–37)
AST SERPL W P-5'-P-CCNC: 22 U/L (ref 5–45)
BASOPHILS # BLD AUTO: 0.07 THOUSANDS/ΜL (ref 0–0.1)
BASOPHILS NFR BLD AUTO: 0 % (ref 0–1)
BILIRUB SERPL-MCNC: 0.57 MG/DL (ref 0.2–1)
BUN SERPL-MCNC: 33 MG/DL (ref 5–25)
CALCIUM ALBUM COR SERPL-MCNC: 12.7 MG/DL (ref 8.3–10.1)
CALCIUM SERPL-MCNC: 11.9 MG/DL (ref 8.3–10.1)
CHLORIDE SERPL-SCNC: 102 MMOL/L (ref 100–108)
CO2 SERPL-SCNC: 26 MMOL/L (ref 21–32)
CREAT SERPL-MCNC: 2.24 MG/DL (ref 0.6–1.3)
EOSINOPHIL # BLD AUTO: 0.01 THOUSAND/ΜL (ref 0–0.61)
EOSINOPHIL NFR BLD AUTO: 0 % (ref 0–6)
ERYTHROCYTE [DISTWIDTH] IN BLOOD BY AUTOMATED COUNT: 14.7 % (ref 11.6–15.1)
GFR SERPL CREATININE-BSD FRML MDRD: 19 ML/MIN/1.73SQ M
GLUCOSE SERPL-MCNC: 117 MG/DL (ref 65–140)
HCT VFR BLD AUTO: 42 % (ref 34.8–46.1)
HGB BLD-MCNC: 13.6 G/DL (ref 11.5–15.4)
IMM GRANULOCYTES # BLD AUTO: 0.15 THOUSAND/UL (ref 0–0.2)
IMM GRANULOCYTES NFR BLD AUTO: 1 % (ref 0–2)
INR PPP: 0.99 (ref 0.84–1.19)
LYMPHOCYTES # BLD AUTO: 1.62 THOUSANDS/ΜL (ref 0.6–4.47)
LYMPHOCYTES NFR BLD AUTO: 9 % (ref 14–44)
MCH RBC QN AUTO: 32.1 PG (ref 26.8–34.3)
MCHC RBC AUTO-ENTMCNC: 32.4 G/DL (ref 31.4–37.4)
MCV RBC AUTO: 99 FL (ref 82–98)
MONOCYTES # BLD AUTO: 1.96 THOUSAND/ΜL (ref 0.17–1.22)
MONOCYTES NFR BLD AUTO: 11 % (ref 4–12)
NEUTROPHILS # BLD AUTO: 13.78 THOUSANDS/ΜL (ref 1.85–7.62)
NEUTS SEG NFR BLD AUTO: 79 % (ref 43–75)
NRBC BLD AUTO-RTO: 0 /100 WBCS
PLATELET # BLD AUTO: 202 THOUSANDS/UL (ref 149–390)
PMV BLD AUTO: 10.9 FL (ref 8.9–12.7)
POTASSIUM SERPL-SCNC: 4.3 MMOL/L (ref 3.5–5.3)
PROT SERPL-MCNC: 8.2 G/DL (ref 6.4–8.2)
PROTHROMBIN TIME: 13.3 SECONDS (ref 11.6–14.5)
RBC # BLD AUTO: 4.24 MILLION/UL (ref 3.81–5.12)
SODIUM SERPL-SCNC: 136 MMOL/L (ref 136–145)
TROPONIN I SERPL-MCNC: 0.03 NG/ML
WBC # BLD AUTO: 17.59 THOUSAND/UL (ref 4.31–10.16)

## 2021-08-25 PROCEDURE — 99285 EMERGENCY DEPT VISIT HI MDM: CPT | Performed by: EMERGENCY MEDICINE

## 2021-08-25 PROCEDURE — 96360 HYDRATION IV INFUSION INIT: CPT

## 2021-08-25 PROCEDURE — 84484 ASSAY OF TROPONIN QUANT: CPT | Performed by: EMERGENCY MEDICINE

## 2021-08-25 PROCEDURE — 99285 EMERGENCY DEPT VISIT HI MDM: CPT

## 2021-08-25 PROCEDURE — 36415 COLL VENOUS BLD VENIPUNCTURE: CPT | Performed by: EMERGENCY MEDICINE

## 2021-08-25 PROCEDURE — 85025 COMPLETE CBC W/AUTO DIFF WBC: CPT | Performed by: EMERGENCY MEDICINE

## 2021-08-25 PROCEDURE — 74176 CT ABD & PELVIS W/O CONTRAST: CPT

## 2021-08-25 PROCEDURE — 80053 COMPREHEN METABOLIC PANEL: CPT | Performed by: EMERGENCY MEDICINE

## 2021-08-25 PROCEDURE — 1123F ACP DISCUSS/DSCN MKR DOCD: CPT | Performed by: INTERNAL MEDICINE

## 2021-08-25 PROCEDURE — 85610 PROTHROMBIN TIME: CPT | Performed by: EMERGENCY MEDICINE

## 2021-08-25 PROCEDURE — 96361 HYDRATE IV INFUSION ADD-ON: CPT

## 2021-08-25 PROCEDURE — 93005 ELECTROCARDIOGRAM TRACING: CPT

## 2021-08-25 PROCEDURE — 71250 CT THORAX DX C-: CPT

## 2021-08-25 PROCEDURE — 72125 CT NECK SPINE W/O DYE: CPT

## 2021-08-25 PROCEDURE — G1004 CDSM NDSC: HCPCS

## 2021-08-25 PROCEDURE — 70450 CT HEAD/BRAIN W/O DYE: CPT

## 2021-08-25 PROCEDURE — 85730 THROMBOPLASTIN TIME PARTIAL: CPT | Performed by: EMERGENCY MEDICINE

## 2021-08-25 RX ADMIN — SODIUM CHLORIDE 1000 ML: 0.9 INJECTION, SOLUTION INTRAVENOUS at 19:55

## 2021-08-25 NOTE — ED PROVIDER NOTES
History  Chief Complaint   Patient presents with    Syncope     syncopal episode at home today  pt is now aaox4, cannot recall event  c/o chronic lower back pain  reports no new injury  Patient is an 66-year-old female with a history of hypertension, who presents for evaluation of a syncopal episode  Patient says that she passed out" earlier today  She says she is not able to remember what happened  She denies any preceding symptoms such as lightheadedness, visual changes, nausea, vomiting, chest pain or shortness of breath  The patient is complaining of some mid abdominal discomfort which she has had since she saw her doctor last   She says it is not any different or worsen since then  She also admits to back pain which she said she has had for a long time as well  The patient does not know whether she hit her head after the syncopal episode  She is not on any blood thinners  The patient is a poor historian  Prior to Admission Medications   Prescriptions Last Dose Informant Patient Reported? Taking?    Zoster Vac Recomb Adjuvanted 50 MCG/0 5ML SUSR Unknown at Unknown time Spouse/Significant Other Yes No   Sig: Inject 0 5 mL into a large muscle now and repeat dose in 60 to 180 days   Patient not taking: Reported on 2021   acetaminophen (TYLENOL) 325 mg tablet 2021 at Unknown time Spouse/Significant Other Yes Yes   Sig: Take 325 mg by mouth    atorvastatin (LIPITOR) 20 mg tablet 2021 at Unknown time Spouse/Significant Other Yes Yes   Si by mouth daily   dicyclomine (BENTYL) 20 mg tablet Unknown at Unknown time  No No   Sig: TAKE 1 TABLET BY MOUTH EVERY 6 HOURS AS NEEDED (ABDOMINAL PAIN)   docusate sodium (COLACE) 100 mg capsule Unknown at Unknown time  No No   Si po at bedtime   Patient not taking: Reported on 2021   gabapentin (NEURONTIN) 100 mg capsule 2021 at Unknown time  Yes Yes   Sig: Take 100 mg by mouth 2 (two) times a day   lidocaine (LIDODERM) 5 % Unknown at Unknown time Self Yes No   Sig: Apply 1 patch topically daily Remove & Discard patch within 12 hours or as directed by MD   losartan (COZAAR) 50 mg tablet Unknown at Unknown time Spouse/Significant Other Yes No   Sig: Take 50 mg by mouth daily   pantoprazole (PROTONIX) 40 mg tablet Unknown at Unknown time Spouse/Significant Other Yes No   polyethylene glycol (MIRALAX) 17 g packet Unknown at Unknown time  No No   Sig: Take 17 g by mouth daily   potassium chloride (Klor-Con) 10 mEq tablet 8/24/2021 at Unknown time Spouse/Significant Other Yes Yes   Sig: Take 10 mEq by mouth daily   sucralfate (CARAFATE) 1 g tablet 8/25/2021 at Unknown time Spouse/Significant Other Yes Yes   Sig: TAKE 1 TAB BY MOUTH 4 TIMES A DAY BEFORE MEALS AND AT BEDTIME    traMADol (ULTRAM) 50 mg tablet Unknown at Unknown time Spouse/Significant Other Yes No   Sig: Take 50 mg by mouth      Facility-Administered Medications: None       Past Medical History:   Diagnosis Date    Chronic pain     Diverticulitis     GERD (gastroesophageal reflux disease)     Hypertension     Shingles     Shortness of breath        Past Surgical History:   Procedure Laterality Date    APPENDECTOMY      CHOLECYSTECTOMY      INCISION AND DRAINAGE RETROPHYARYNGEAL ABCESS      JOINT REPLACEMENT      PELVIC FRACTURE SURGERY      TOTAL HIP ARTHROPLASTY         Family History   Problem Relation Age of Onset    Stroke Mother      I have reviewed and agree with the history as documented  E-Cigarette/Vaping    E-Cigarette Use Never User      E-Cigarette/Vaping Substances    Nicotine No     THC No     CBD No     Flavoring No     Other No     Unknown No      Social History     Tobacco Use    Smoking status: Never Smoker    Smokeless tobacco: Never Used   Vaping Use    Vaping Use: Never used   Substance Use Topics    Alcohol use: Never    Drug use: Never       Review of Systems   Constitutional: Negative for fever and unexpected weight change  HENT: Negative for congestion, ear pain, sore throat and trouble swallowing  Eyes: Negative for pain and redness  Respiratory: Negative for cough, chest tightness and shortness of breath  Cardiovascular: Negative for chest pain and leg swelling  Gastrointestinal: Positive for abdominal pain  Negative for abdominal distention, diarrhea and vomiting  Endocrine: Negative for polyuria  Genitourinary: Negative for dysuria, hematuria, pelvic pain and vaginal bleeding  Musculoskeletal: Positive for back pain  Negative for myalgias  Skin: Negative for rash  Neurological: Positive for syncope  Negative for dizziness, weakness, light-headedness and headaches  Physical Exam  Physical Exam  Vitals and nursing note reviewed  Constitutional:       General: She is not in acute distress  Appearance: She is well-developed  HENT:      Head: Normocephalic and atraumatic  Right Ear: External ear normal       Left Ear: External ear normal       Mouth/Throat:      Mouth: Mucous membranes are moist       Pharynx: No oropharyngeal exudate  Eyes:      Conjunctiva/sclera: Conjunctivae normal       Pupils: Pupils are equal, round, and reactive to light  Cardiovascular:      Rate and Rhythm: Normal rate and regular rhythm  Heart sounds: Normal heart sounds  No murmur heard  No friction rub  No gallop  Pulmonary:      Effort: Pulmonary effort is normal  No respiratory distress  Breath sounds: Normal breath sounds  No wheezing or rales  Abdominal:      General: There is no distension  Palpations: Abdomen is soft  Tenderness: There is abdominal tenderness (Diffuse)  There is no guarding  Musculoskeletal:         General: Swelling and tenderness present  No deformity  Normal range of motion  Cervical back: Normal range of motion and neck supple  Comments: T and L-spine tenderness   Lymphadenopathy:      Cervical: No cervical adenopathy     Skin:     General: Skin is warm and dry  Neurological:      General: No focal deficit present  Mental Status: She is alert and oriented to person, place, and time  Mental status is at baseline  Cranial Nerves: No cranial nerve deficit  Sensory: No sensory deficit  Motor: No abnormal muscle tone           Vital Signs  ED Triage Vitals   Temperature Pulse Respirations Blood Pressure SpO2   08/25/21 1903 08/25/21 1903 08/25/21 1903 08/25/21 1903 08/25/21 1903   98 4 °F (36 9 °C) 86 16 123/62 97 %      Temp Source Heart Rate Source Patient Position - Orthostatic VS BP Location FiO2 (%)   08/25/21 1903 08/25/21 1903 -- -- --   Oral Monitor         Pain Score       08/26/21 0100       No Pain           Vitals:    08/25/21 1903 08/25/21 2130 08/26/21 0000 08/26/21 0100   BP: 123/62 147/67 147/82 134/75   Pulse: 86 74 83 87         Visual Acuity  Visual Acuity      Most Recent Value   L Pupil Size (mm)  3   R Pupil Size (mm)  3          ED Medications  Medications   sodium chloride 0 9 % bolus 1,000 mL (0 mL Intravenous Stopped 8/26/21 0000)       Diagnostic Studies  Results Reviewed     Procedure Component Value Units Date/Time    Comprehensive metabolic panel [[id number]]  (Abnormal) Collected: 08/25/21 1954    Lab Status: Final result Specimen: Blood from Arm, Left Updated: 08/25/21 2035     Sodium 136 mmol/L      Potassium 4 3 mmol/L      Chloride 102 mmol/L      CO2 26 mmol/L      ANION GAP 8 mmol/L      BUN 33 mg/dL      Creatinine 2 24 mg/dL      Glucose 117 mg/dL      Calcium 11 9 mg/dL      Corrected Calcium 12 7 mg/dL      AST 22 U/L      ALT 16 U/L      Alkaline Phosphatase 240 U/L      Total Protein 8 2 g/dL      Albumin 3 0 g/dL      Total Bilirubin 0 57 mg/dL      eGFR 19 ml/min/1 73sq m     Narrative:      Donnell guidelines for Chronic Kidney Disease (CKD):     Stage 1 with normal or high GFR (GFR > 90 mL/min/1 73 square meters)    Stage 2 Mild CKD (GFR = 60-89 mL/min/1 73 square meters)    Stage 3A Moderate CKD (GFR = 45-59 mL/min/1 73 square meters)    Stage 3B Moderate CKD (GFR = 30-44 mL/min/1 73 square meters)    Stage 4 Severe CKD (GFR = 15-29 mL/min/1 73 square meters)    Stage 5 End Stage CKD (GFR <15 mL/min/1 73 square meters)  Note: GFR calculation is accurate only with a steady state creatinine    Troponin I [[id number]]  (Normal) Collected: 08/25/21 1954    Lab Status: Final result Specimen: Blood from Arm, Left Updated: 08/25/21 2034     Troponin I 0 03 ng/mL     Protime-INR [[id number]]  (Normal) Collected: 08/25/21 1954    Lab Status: Final result Specimen: Blood from Arm, Left Updated: 08/25/21 2017     Protime 13 3 seconds      INR 0 99    APTT [[id number]]  (Normal) Collected: 08/25/21 1954    Lab Status: Final result Specimen: Blood from Arm, Left Updated: 08/25/21 2017     PTT 28 seconds     CBC and differential [[id number]]  (Abnormal) Collected: 08/25/21 1954    Lab Status: Final result Specimen: Blood from Arm, Left Updated: 08/25/21 2003     WBC 17 59 Thousand/uL      RBC 4 24 Million/uL      Hemoglobin 13 6 g/dL      Hematocrit 42 0 %      MCV 99 fL      MCH 32 1 pg      MCHC 32 4 g/dL      RDW 14 7 %      MPV 10 9 fL      Platelets 123 Thousands/uL      nRBC 0 /100 WBCs      Neutrophils Relative 79 %      Immat GRANS % 1 %      Lymphocytes Relative 9 %      Monocytes Relative 11 %      Eosinophils Relative 0 %      Basophils Relative 0 %      Neutrophils Absolute 13 78 Thousands/µL      Immature Grans Absolute 0 15 Thousand/uL      Lymphocytes Absolute 1 62 Thousands/µL      Monocytes Absolute 1 96 Thousand/µL      Eosinophils Absolute 0 01 Thousand/µL      Basophils Absolute 0 07 Thousands/µL                  CT head without contrast   Final Result by Lovette Ganser, MD (08/25 2048)      No acute intracranial abnormality                    Workstation performed: [id number]         CT spine cervical without contrast   Final Result by Lovette Ganser, MD (08/25 2048)      No cervical spine fracture or traumatic malalignment  Degenerative disc changes from C3 to C7  Workstation performed: [id number]         CT chest abdomen pelvis wo contrast   Final Result by Jose Manuel Johns MD (08/25 2048)      Moderate wedge fracture deformity of T7, L3 and L5 and severe wedge deformity of T8 appears new  Stool impacted rectosigmoid colon  Workstation performed: [id number]         CT recon only thoracolumbar (No Charge)   Final Result by Jose Manuel Johns MD (08/25 2047)      New moderate wedge fracture deformities of T7, L3, L5 with severe fracture deformity of T8  No retropulsion  Workstation performed: [id number]                    Procedures  ECG 12 Lead Documentation Only    Date/Time: 8/25/2021 7:36 PM  Performed by: Clari Frazier DO  Authorized by: Clari Frazier DO     Indications / Diagnosis:  Syncope   ECG reviewed by me, the ED Provider: yes    Patient location:  ED  Previous ECG:     Previous ECG:  Unavailable    Comparison to cardiac monitor: Yes    Interpretation:     Interpretation: normal    Rate:     ECG rate:  83    ECG rate assessment: normal    Rhythm:     Rhythm: sinus rhythm    Ectopy:     Ectopy: none    QRS:     QRS axis:  Normal  Conduction:     Conduction: normal    ST segments:     ST segments:  Normal  T waves:     T waves: normal               ED Course                             SBIRT 22yo+      Most Recent Value   SBIRT (22 yo +)   In order to provide better care to our patients, we are screening all of our patients for alcohol and drug use  Would it be okay to ask you these screening questions? Yes Filed at: 08/26/2021 0006   Initial Alcohol Screen: US AUDIT-C    1  How often do you have a drink containing alcohol?  0 Filed at: 08/26/2021 0006   2  How many drinks containing alcohol do you have on a typical day you are drinking? 0 Filed at: 08/26/2021 0006   3b  FEMALE Any Age, or MALE 65+:  How often do you have 4 or more drinks on one occassion? 0 Filed at: 08/26/2021 0006   Audit-C Score  0 Filed at: 08/26/2021 0006   BARRETT: How many times in the past year have you    Used an illegal drug or used a prescription medication for non-medical reasons? Never Filed at: 08/26/2021 0006                    [facility]  Number of Diagnoses or Management Options  Compression fracture of body of thoracic vertebra (HCC)  Hypercalcemia  Lumbar compression fracture (HCC)  Syncope  Diagnosis management comments: 49-year-old female presenting for syncopal episode  Unable to tell me about the event as she does not remember  Denies preceding symptoms such as lightheadedness, visual changes, nausea, vomiting, chest pain shortness of breath  No cardiac history  Is complaining of some chronic abdominal as well as back pain  Not worsening than usual   Vitals are within normal limits  Will obtain cardiac workup  Will obtain CT head and C-spine given unknown head strike  Given T and L-spine tenderness, will obtain CT chest abdomen pelvis    Spoke with trauma given what appears to be new compression fractures of the T and L-spine, said to transfer to [place] under the Trauma Service      Disposition  Final diagnoses:   Syncope   Hypercalcemia   Compression fracture of body of thoracic vertebra (HCC)   Lumbar compression fracture ([facility] [address] )     Time reflects when diagnosis was documented in both MDM as applicable and the Disposition within this note     Time User Action Codes Description Comment    8/25/2021  9:41 PM Alisa Pablo [R55] Syncope     8/25/2021  9:41 PM Alisa Pablo [E83 52] Hypercalcemia     8/25/2021  9:41 PM Merian Half Add [S22 060A] Compression fracture of T8 vertebra, initial encounter ([facility] [address] )     8/25/2021  9:41 PM Merian Half Add [S22 000A] Compression fracture of body of thoracic vertebra ([facility] [address] )     8/25/2021  9:41 PM Merian Half Remove [S22 060A] Compression fracture of T8 vertebra, initial encounter ([facility] [address] ) 8/25/2021  9:42 PM Charbel Del Toro Add [S32 000A] Lumbar compression fracture [facility])       ED Disposition     ED Disposition Condition Date/Time Comment    Transfer to Another Facility-In Network  Wed Aug 25, 2021  9:41 PM Stacey Alan should be transferred out to [facility]        MD Documentation      Most Recent Value   Patient Condition  An emergency transfer is being made prior to stabilization due to the need for definitive care and the benefit of transfer outweighs the risk, The patient has been stabilized such that within reasonable medical probability, no material deterioration of the patient condition or the condition of the unborn child(layla) is likely to result from the transfer   Reason for Transfer  Level of Care needed not available at this facility   Benefits of Transfer  Specialized equipment and/or services available at the receiving facility (Include comment)________________________   Risks of Transfer  Potential for delay in receiving treatment, Potential deterioration of medical condition, Increased discomfort during transfer   Accepting Physician  Dr Eleuterio Delgado Name, Yessenia Driscoll   Provider Certification  Unanticipated needs of medical equipment and personnel during transport, Risk of worsening condition, General risk, such as traffic hazards, adverse weather conditions, rough terrain or turbulence, possible failure of equipment (including vehicle or aircraft), or consequences of actions of persons outside the control of the transport personnel, The possibility of a transport vehicle being unavailable      RN Documentation      Most [address] [address] [address] Name,  Kristal Spann premcasandra yazan       Follow-up Information    None         Discharge Medication List as of 8/26/2021  1:24 AM      CONTINUE these medications which have NOT CHANGED    Details   acetaminophen (TYLENOL) 325 mg tablet Take 325 mg by mouth , Historical Med      atorvastatin (LIPITOR) 20 mg tablet 1 by mouth daily, Historical Med      gabapentin (NEURONTIN) 100 mg capsule Take 100 mg by mouth 2 (two) times a day, Historical Med      potassium chloride (Klor-Con) 10 mEq tablet Take 10 mEq by mouth daily, Starting Thu 7/8/2021, Historical Med      sucralfate (CARAFATE) 1 g tablet TAKE 1 TAB BY MOUTH 4 TIMES A DAY BEFORE MEALS AND AT BEDTIME , Historical Med      dicyclomine (BENTYL) 20 mg tablet TAKE 1 TABLET BY MOUTH EVERY 6 HOURS AS NEEDED (ABDOMINAL PAIN), Normal      docusate sodium (COLACE) 100 mg capsule 2 po at bedtime, Normal      lidocaine (LIDODERM) 5 % Apply 1 patch topically daily Remove & Discard patch within 12 hours or as directed by MD, Historical Med      losartan (COZAAR) 50 mg tablet Take 50 mg by mouth daily, Starting Wed 5/26/2021, Historical Med      pantoprazole (PROTONIX) 40 mg tablet Starting Sat 5/29/2021, Historical Med      polyethylene glycol (MIRALAX) 17 g packet Take 17 g by mouth daily, Starting Mon 7/12/2021, Normal      traMADol (ULTRAM) 50 mg tablet Take 50 mg by mouth, Starting Fri 6/4/2021, Historical Med      Zoster Vac Recomb Adjuvanted 50 MCG/0 5ML SUSR Inject 0 5 mL into a large muscle now and repeat dose in 60 to 180 days, Historical Med      ALPRAZolam (XANAX) 0 25 mg tablet 1 tab 1-2 times daily as needed for anxiety, Historical Med      cyclobenzaprine (FLEXERIL) 10 mg tablet TAKE 1 TABLET BY MOUTH 3 TIMES A DAY AS NEEDED FOR MUSCLE SPASMS, Historical Med           No discharge procedures on file      PDMP Review     None          ED Provider  Electronically Signed by           Ninoska Miller DO  08/28/21 6720

## 2021-08-26 ENCOUNTER — APPOINTMENT (OUTPATIENT)
Dept: RADIOLOGY | Facility: HOSPITAL | Age: 86
DRG: 840 | End: 2021-08-26
Payer: MEDICARE

## 2021-08-26 ENCOUNTER — HOSPITAL ENCOUNTER (INPATIENT)
Facility: HOSPITAL | Age: 86
LOS: 8 days | Discharge: NURSING, REHAB OR LONG-TERM CARE | DRG: 840 | End: 2021-09-04
Attending: SURGERY | Admitting: STUDENT IN AN ORGANIZED HEALTH CARE EDUCATION/TRAINING PROGRAM
Payer: MEDICARE

## 2021-08-26 ENCOUNTER — TELEPHONE (OUTPATIENT)
Dept: RADIOLOGY | Facility: HOSPITAL | Age: 86
End: 2021-08-26

## 2021-08-26 ENCOUNTER — APPOINTMENT (OUTPATIENT)
Dept: NON INVASIVE DIAGNOSTICS | Facility: HOSPITAL | Age: 86
DRG: 840 | End: 2021-08-26
Payer: MEDICARE

## 2021-08-26 VITALS
TEMPERATURE: 98.4 F | RESPIRATION RATE: 18 BRPM | DIASTOLIC BLOOD PRESSURE: 75 MMHG | SYSTOLIC BLOOD PRESSURE: 134 MMHG | OXYGEN SATURATION: 95 % | HEART RATE: 87 BPM

## 2021-08-26 DIAGNOSIS — E83.52 HYPERCALCEMIA: ICD-10-CM

## 2021-08-26 DIAGNOSIS — R55 SYNCOPE, UNSPECIFIED SYNCOPE TYPE: ICD-10-CM

## 2021-08-26 DIAGNOSIS — S22.060A COMPRESSION FRACTURE OF T8 VERTEBRA, INITIAL ENCOUNTER (HCC): Primary | ICD-10-CM

## 2021-08-26 DIAGNOSIS — S32.030A COMPRESSION FRACTURE OF L3 VERTEBRA, INITIAL ENCOUNTER (HCC): ICD-10-CM

## 2021-08-26 DIAGNOSIS — I82.4Z2 ACUTE DEEP VEIN THROMBOSIS (DVT) OF DISTAL VEIN OF LEFT LOWER EXTREMITY (HCC): ICD-10-CM

## 2021-08-26 DIAGNOSIS — S22.060A COMPRESSION FRACTURE OF T7 VERTEBRA, INITIAL ENCOUNTER (HCC): ICD-10-CM

## 2021-08-26 DIAGNOSIS — S22.060A: ICD-10-CM

## 2021-08-26 DIAGNOSIS — S32.050A COMPRESSION FRACTURE OF L5 VERTEBRA, INITIAL ENCOUNTER (HCC): ICD-10-CM

## 2021-08-26 PROBLEM — N17.9 AKI (ACUTE KIDNEY INJURY) (HCC): Status: ACTIVE | Noted: 2021-08-26

## 2021-08-26 LAB
ANION GAP SERPL CALCULATED.3IONS-SCNC: 6 MMOL/L (ref 4–13)
APTT PPP: 42 SECONDS (ref 23–37)
APTT PPP: >210 SECONDS (ref 23–37)
ATRIAL RATE: 77 BPM
BASOPHILS # BLD AUTO: 0.08 THOUSANDS/ΜL (ref 0–0.1)
BASOPHILS NFR BLD AUTO: 1 % (ref 0–1)
BUN SERPL-MCNC: 35 MG/DL (ref 5–25)
CALCIUM SERPL-MCNC: 10.9 MG/DL (ref 8.3–10.1)
CALCIUM SERPL-MCNC: 11.3 MG/DL (ref 8.3–10.1)
CHLORIDE SERPL-SCNC: 105 MMOL/L (ref 100–108)
CK SERPL-CCNC: 47 U/L (ref 26–192)
CO2 SERPL-SCNC: 25 MMOL/L (ref 21–32)
CREAT SERPL-MCNC: 1.53 MG/DL (ref 0.6–1.3)
EOSINOPHIL # BLD AUTO: 0.05 THOUSAND/ΜL (ref 0–0.61)
EOSINOPHIL NFR BLD AUTO: 0 % (ref 0–6)
ERYTHROCYTE [DISTWIDTH] IN BLOOD BY AUTOMATED COUNT: 14.6 % (ref 11.6–15.1)
ERYTHROCYTE [DISTWIDTH] IN BLOOD BY AUTOMATED COUNT: 14.6 % (ref 11.6–15.1)
GFR SERPL CREATININE-BSD FRML MDRD: 30 ML/MIN/1.73SQ M
GLUCOSE P FAST SERPL-MCNC: 115 MG/DL (ref 65–99)
GLUCOSE SERPL-MCNC: 115 MG/DL (ref 65–140)
HCT VFR BLD AUTO: 40.2 % (ref 34.8–46.1)
HCT VFR BLD AUTO: 41.3 % (ref 34.8–46.1)
HGB BLD-MCNC: 13 G/DL (ref 11.5–15.4)
HGB BLD-MCNC: 13.5 G/DL (ref 11.5–15.4)
IMM GRANULOCYTES # BLD AUTO: 0.11 THOUSAND/UL (ref 0–0.2)
IMM GRANULOCYTES NFR BLD AUTO: 1 % (ref 0–2)
INR PPP: 1.47 (ref 0.84–1.19)
LYMPHOCYTES # BLD AUTO: 1.49 THOUSANDS/ΜL (ref 0.6–4.47)
LYMPHOCYTES NFR BLD AUTO: 10 % (ref 14–44)
MCH RBC QN AUTO: 32.4 PG (ref 26.8–34.3)
MCH RBC QN AUTO: 33.1 PG (ref 26.8–34.3)
MCHC RBC AUTO-ENTMCNC: 32.3 G/DL (ref 31.4–37.4)
MCHC RBC AUTO-ENTMCNC: 32.7 G/DL (ref 31.4–37.4)
MCV RBC AUTO: 102 FL (ref 82–98)
MCV RBC AUTO: 99 FL (ref 82–98)
MONOCYTES # BLD AUTO: 1.38 THOUSAND/ΜL (ref 0.17–1.22)
MONOCYTES NFR BLD AUTO: 10 % (ref 4–12)
NEUTROPHILS # BLD AUTO: 11.48 THOUSANDS/ΜL (ref 1.85–7.62)
NEUTS SEG NFR BLD AUTO: 78 % (ref 43–75)
NRBC BLD AUTO-RTO: 0 /100 WBCS
NT-PROBNP SERPL-MCNC: 550 PG/ML
P AXIS: 46 DEGREES
PLATELET # BLD AUTO: 172 THOUSANDS/UL (ref 149–390)
PLATELET # BLD AUTO: 197 THOUSANDS/UL (ref 149–390)
PMV BLD AUTO: 11.2 FL (ref 8.9–12.7)
PMV BLD AUTO: 11.3 FL (ref 8.9–12.7)
POTASSIUM SERPL-SCNC: 3.7 MMOL/L (ref 3.5–5.3)
PR INTERVAL: 154 MS
PROTHROMBIN TIME: 17.8 SECONDS (ref 11.6–14.5)
PTH-INTACT SERPL-MCNC: 61.9 PG/ML (ref 18.4–80.1)
QRS AXIS: -13 DEGREES
QRSD INTERVAL: 80 MS
QT INTERVAL: 346 MS
QTC INTERVAL: 391 MS
RBC # BLD AUTO: 3.93 MILLION/UL (ref 3.81–5.12)
RBC # BLD AUTO: 4.17 MILLION/UL (ref 3.81–5.12)
SODIUM SERPL-SCNC: 136 MMOL/L (ref 136–145)
T WAVE AXIS: 13 DEGREES
TROPONIN I SERPL-MCNC: 0.02 NG/ML
TROPONIN I SERPL-MCNC: 0.02 NG/ML
TSH SERPL DL<=0.05 MIU/L-ACNC: 0.62 UIU/ML (ref 0.36–3.74)
VENTRICULAR RATE: 77 BPM
WBC # BLD AUTO: 13.56 THOUSAND/UL (ref 4.31–10.16)
WBC # BLD AUTO: 14.59 THOUSAND/UL (ref 4.31–10.16)

## 2021-08-26 PROCEDURE — 87040 BLOOD CULTURE FOR BACTERIA: CPT | Performed by: NURSE PRACTITIONER

## 2021-08-26 PROCEDURE — 82550 ASSAY OF CK (CPK): CPT | Performed by: NURSE PRACTITIONER

## 2021-08-26 PROCEDURE — 86334 IMMUNOFIX E-PHORESIS SERUM: CPT | Performed by: PATHOLOGY

## 2021-08-26 PROCEDURE — 83880 ASSAY OF NATRIURETIC PEPTIDE: CPT | Performed by: EMERGENCY MEDICINE

## 2021-08-26 PROCEDURE — 83883 ASSAY NEPHELOMETRY NOT SPEC: CPT | Performed by: PHYSICIAN ASSISTANT

## 2021-08-26 PROCEDURE — 36415 COLL VENOUS BLD VENIPUNCTURE: CPT | Performed by: EMERGENCY MEDICINE

## 2021-08-26 PROCEDURE — 85730 THROMBOPLASTIN TIME PARTIAL: CPT | Performed by: NURSE PRACTITIONER

## 2021-08-26 PROCEDURE — 85025 COMPLETE CBC W/AUTO DIFF WBC: CPT | Performed by: EMERGENCY MEDICINE

## 2021-08-26 PROCEDURE — 85730 THROMBOPLASTIN TIME PARTIAL: CPT | Performed by: SURGERY

## 2021-08-26 PROCEDURE — 99205 OFFICE O/P NEW HI 60 MIN: CPT | Performed by: INTERNAL MEDICINE

## 2021-08-26 PROCEDURE — 85610 PROTHROMBIN TIME: CPT | Performed by: NURSE PRACTITIONER

## 2021-08-26 PROCEDURE — 93010 ELECTROCARDIOGRAM REPORT: CPT | Performed by: INTERNAL MEDICINE

## 2021-08-26 PROCEDURE — 83970 ASSAY OF PARATHORMONE: CPT | Performed by: NURSE PRACTITIONER

## 2021-08-26 PROCEDURE — 99222 1ST HOSP IP/OBS MODERATE 55: CPT | Performed by: PHYSICIAN ASSISTANT

## 2021-08-26 PROCEDURE — 93971 EXTREMITY STUDY: CPT

## 2021-08-26 PROCEDURE — 84484 ASSAY OF TROPONIN QUANT: CPT | Performed by: EMERGENCY MEDICINE

## 2021-08-26 PROCEDURE — 99215 OFFICE O/P EST HI 40 MIN: CPT | Performed by: INTERNAL MEDICINE

## 2021-08-26 PROCEDURE — 80048 BASIC METABOLIC PNL TOTAL CA: CPT | Performed by: EMERGENCY MEDICINE

## 2021-08-26 PROCEDURE — 93971 EXTREMITY STUDY: CPT | Performed by: SURGERY

## 2021-08-26 PROCEDURE — 93005 ELECTROCARDIOGRAM TRACING: CPT

## 2021-08-26 PROCEDURE — 84484 ASSAY OF TROPONIN QUANT: CPT | Performed by: NURSE PRACTITIONER

## 2021-08-26 PROCEDURE — 93306 TTE W/DOPPLER COMPLETE: CPT | Performed by: INTERNAL MEDICINE

## 2021-08-26 PROCEDURE — 86334 IMMUNOFIX E-PHORESIS SERUM: CPT | Performed by: PHYSICIAN ASSISTANT

## 2021-08-26 PROCEDURE — 85027 COMPLETE CBC AUTOMATED: CPT | Performed by: NURSE PRACTITIONER

## 2021-08-26 PROCEDURE — 93306 TTE W/DOPPLER COMPLETE: CPT

## 2021-08-26 PROCEDURE — 71275 CT ANGIOGRAPHY CHEST: CPT

## 2021-08-26 PROCEDURE — 84443 ASSAY THYROID STIM HORMONE: CPT | Performed by: NURSE PRACTITIONER

## 2021-08-26 PROCEDURE — 99285 EMERGENCY DEPT VISIT HI MDM: CPT

## 2021-08-26 PROCEDURE — 99219 PR INITIAL OBSERVATION CARE/DAY 50 MINUTES: CPT | Performed by: SURGERY

## 2021-08-26 PROCEDURE — 82397 CHEMILUMINESCENT ASSAY: CPT | Performed by: NURSE PRACTITIONER

## 2021-08-26 PROCEDURE — 84165 PROTEIN E-PHORESIS SERUM: CPT | Performed by: PATHOLOGY

## 2021-08-26 PROCEDURE — 84165 PROTEIN E-PHORESIS SERUM: CPT | Performed by: PHYSICIAN ASSISTANT

## 2021-08-26 PROCEDURE — 82308 ASSAY OF CALCITONIN: CPT | Performed by: NURSE PRACTITIONER

## 2021-08-26 PROCEDURE — G1004 CDSM NDSC: HCPCS

## 2021-08-26 PROCEDURE — 82310 ASSAY OF CALCIUM: CPT | Performed by: NURSE PRACTITIONER

## 2021-08-26 RX ORDER — HEPARIN SODIUM 10000 [USP'U]/100ML
3-30 INJECTION, SOLUTION INTRAVENOUS
Status: DISCONTINUED | OUTPATIENT
Start: 2021-08-26 | End: 2021-09-01

## 2021-08-26 RX ORDER — HEPARIN SODIUM 1000 [USP'U]/ML
4000 INJECTION, SOLUTION INTRAVENOUS; SUBCUTANEOUS
Status: DISCONTINUED | OUTPATIENT
Start: 2021-08-26 | End: 2021-09-01

## 2021-08-26 RX ORDER — SODIUM CHLORIDE, SODIUM GLUCONATE, SODIUM ACETATE, POTASSIUM CHLORIDE, MAGNESIUM CHLORIDE, SODIUM PHOSPHATE, DIBASIC, AND POTASSIUM PHOSPHATE .53; .5; .37; .037; .03; .012; .00082 G/100ML; G/100ML; G/100ML; G/100ML; G/100ML; G/100ML; G/100ML
1000 INJECTION, SOLUTION INTRAVENOUS ONCE
Status: COMPLETED | OUTPATIENT
Start: 2021-08-26 | End: 2021-08-26

## 2021-08-26 RX ORDER — GABAPENTIN 100 MG/1
100 CAPSULE ORAL 2 TIMES DAILY
Status: DISCONTINUED | OUTPATIENT
Start: 2021-08-26 | End: 2021-09-04 | Stop reason: HOSPADM

## 2021-08-26 RX ORDER — HEPARIN SODIUM 5000 [USP'U]/ML
5000 INJECTION, SOLUTION INTRAVENOUS; SUBCUTANEOUS EVERY 8 HOURS SCHEDULED
Status: DISCONTINUED | OUTPATIENT
Start: 2021-08-26 | End: 2021-08-26

## 2021-08-26 RX ORDER — SODIUM CHLORIDE, SODIUM GLUCONATE, SODIUM ACETATE, POTASSIUM CHLORIDE, MAGNESIUM CHLORIDE, SODIUM PHOSPHATE, DIBASIC, AND POTASSIUM PHOSPHATE .53; .5; .37; .037; .03; .012; .00082 G/100ML; G/100ML; G/100ML; G/100ML; G/100ML; G/100ML; G/100ML
100 INJECTION, SOLUTION INTRAVENOUS ONCE
Status: DISCONTINUED | OUTPATIENT
Start: 2021-08-26 | End: 2021-08-26

## 2021-08-26 RX ORDER — LOSARTAN POTASSIUM 50 MG/1
50 TABLET ORAL DAILY
Status: DISCONTINUED | OUTPATIENT
Start: 2021-08-27 | End: 2021-08-26

## 2021-08-26 RX ORDER — LOSARTAN POTASSIUM 50 MG/1
50 TABLET ORAL ONCE
Status: COMPLETED | OUTPATIENT
Start: 2021-08-26 | End: 2021-08-26

## 2021-08-26 RX ORDER — POLYETHYLENE GLYCOL 3350 17 G/17G
17 POWDER, FOR SOLUTION ORAL DAILY
Status: DISCONTINUED | OUTPATIENT
Start: 2021-08-26 | End: 2021-09-04 | Stop reason: HOSPADM

## 2021-08-26 RX ORDER — SODIUM CHLORIDE, SODIUM GLUCONATE, SODIUM ACETATE, POTASSIUM CHLORIDE, MAGNESIUM CHLORIDE, SODIUM PHOSPHATE, DIBASIC, AND POTASSIUM PHOSPHATE .53; .5; .37; .037; .03; .012; .00082 G/100ML; G/100ML; G/100ML; G/100ML; G/100ML; G/100ML; G/100ML
75 INJECTION, SOLUTION INTRAVENOUS ONCE
Status: COMPLETED | OUTPATIENT
Start: 2021-08-26 | End: 2021-08-26

## 2021-08-26 RX ORDER — LOSARTAN POTASSIUM 50 MG/1
50 TABLET ORAL DAILY
Status: DISCONTINUED | OUTPATIENT
Start: 2021-08-26 | End: 2021-08-26

## 2021-08-26 RX ORDER — SUCRALFATE 1 G/1
1 TABLET ORAL
Status: DISCONTINUED | OUTPATIENT
Start: 2021-08-26 | End: 2021-09-04 | Stop reason: HOSPADM

## 2021-08-26 RX ORDER — PANTOPRAZOLE SODIUM 40 MG/1
40 TABLET, DELAYED RELEASE ORAL
Status: DISCONTINUED | OUTPATIENT
Start: 2021-08-26 | End: 2021-09-04 | Stop reason: HOSPADM

## 2021-08-26 RX ORDER — HEPARIN SODIUM 1000 [USP'U]/ML
4000 INJECTION, SOLUTION INTRAVENOUS; SUBCUTANEOUS ONCE
Status: COMPLETED | OUTPATIENT
Start: 2021-08-26 | End: 2021-08-26

## 2021-08-26 RX ORDER — DOCUSATE SODIUM 100 MG/1
100 CAPSULE, LIQUID FILLED ORAL 2 TIMES DAILY
Status: DISCONTINUED | OUTPATIENT
Start: 2021-08-26 | End: 2021-09-04 | Stop reason: HOSPADM

## 2021-08-26 RX ORDER — ATORVASTATIN CALCIUM 20 MG/1
20 TABLET, FILM COATED ORAL
Status: DISCONTINUED | OUTPATIENT
Start: 2021-08-26 | End: 2021-08-26

## 2021-08-26 RX ORDER — HEPARIN SODIUM 1000 [USP'U]/ML
2000 INJECTION, SOLUTION INTRAVENOUS; SUBCUTANEOUS
Status: DISCONTINUED | OUTPATIENT
Start: 2021-08-26 | End: 2021-09-01

## 2021-08-26 RX ORDER — DICYCLOMINE HYDROCHLORIDE 10 MG/1
20 CAPSULE ORAL 4 TIMES DAILY PRN
Status: DISCONTINUED | OUTPATIENT
Start: 2021-08-26 | End: 2021-09-04 | Stop reason: HOSPADM

## 2021-08-26 RX ORDER — ALPRAZOLAM 0.25 MG/1
0.25 TABLET ORAL 2 TIMES DAILY PRN
Status: DISCONTINUED | OUTPATIENT
Start: 2021-08-26 | End: 2021-09-04 | Stop reason: HOSPADM

## 2021-08-26 RX ORDER — CYCLOBENZAPRINE HCL 10 MG
10 TABLET ORAL 3 TIMES DAILY PRN
Status: DISCONTINUED | OUTPATIENT
Start: 2021-08-26 | End: 2021-08-29

## 2021-08-26 RX ADMIN — SODIUM CHLORIDE 1000 ML: 0.9 INJECTION, SOLUTION INTRAVENOUS at 20:51

## 2021-08-26 RX ADMIN — IOHEXOL 56 ML: 350 INJECTION, SOLUTION INTRAVENOUS at 19:55

## 2021-08-26 RX ADMIN — SODIUM CHLORIDE, SODIUM GLUCONATE, SODIUM ACETATE, POTASSIUM CHLORIDE, MAGNESIUM CHLORIDE, SODIUM PHOSPHATE, DIBASIC, AND POTASSIUM PHOSPHATE 1000 ML: .53; .5; .37; .037; .03; .012; .00082 INJECTION, SOLUTION INTRAVENOUS at 11:34

## 2021-08-26 RX ADMIN — GABAPENTIN 100 MG: 100 CAPSULE ORAL at 09:03

## 2021-08-26 RX ADMIN — LOSARTAN POTASSIUM 50 MG: 50 TABLET, FILM COATED ORAL at 04:39

## 2021-08-26 RX ADMIN — GABAPENTIN 100 MG: 100 CAPSULE ORAL at 21:03

## 2021-08-26 RX ADMIN — HEPARIN SODIUM 4000 UNITS: 1000 INJECTION INTRAVENOUS; SUBCUTANEOUS at 12:23

## 2021-08-26 RX ADMIN — IODIXANOL 56 ML: 320 INJECTION, SOLUTION INTRAVASCULAR at 20:00

## 2021-08-26 RX ADMIN — DOCUSATE SODIUM 100 MG: 100 CAPSULE, LIQUID FILLED ORAL at 21:03

## 2021-08-26 RX ADMIN — PANTOPRAZOLE SODIUM 40 MG: 40 TABLET, DELAYED RELEASE ORAL at 07:24

## 2021-08-26 RX ADMIN — HEPARIN SODIUM 5000 UNITS: 5000 INJECTION INTRAVENOUS; SUBCUTANEOUS at 07:25

## 2021-08-26 RX ADMIN — SUCRALFATE 1 G: 1 TABLET ORAL at 21:03

## 2021-08-26 RX ADMIN — SUCRALFATE 1 G: 1 TABLET ORAL at 07:25

## 2021-08-26 RX ADMIN — SODIUM CHLORIDE, SODIUM GLUCONATE, SODIUM ACETATE, POTASSIUM CHLORIDE, MAGNESIUM CHLORIDE, SODIUM PHOSPHATE, DIBASIC, AND POTASSIUM PHOSPHATE 75 ML/HR: .53; .5; .37; .037; .03; .012; .00082 INJECTION, SOLUTION INTRAVENOUS at 14:46

## 2021-08-26 RX ADMIN — CYCLOBENZAPRINE HYDROCHLORIDE 10 MG: 10 TABLET, FILM COATED ORAL at 09:26

## 2021-08-26 RX ADMIN — HEPARIN SODIUM 18 UNITS/KG/HR: 10000 INJECTION, SOLUTION INTRAVENOUS at 12:23

## 2021-08-26 NOTE — EMTALA/ACUTE CARE TRANSFER
[address] [place] [address]  [address] [address] [facility] [address]  Dept: [phone]      [id number] TRANSFER CONSENT    NAME Lis Duke                                         10/15/1931                              MRN [id number]    I have been informed of my rights regarding examination, treatment, and transfer   by Dr Don Miranda DO    Benefits: Specialized equipment and/or services available at the receiving facility (Include comment)________________________    Risks: Potential for delay in receiving treatment, Potential deterioration of medical condition, Increased discomfort during transfer      Consent for Transfer:  I acknowledge that my medical condition has been evaluated and explained to me by the emergency department physician or other qualified medical person and/or my attending physician, who has recommended that I be transferred to the service of  Accepting Physician: Dr Marissa Eaton at [address]  Name, [address] : [facility]   The above potential benefits of such transfer, the potential risks associated with such transfer, and the probable risks of not being transferred have been explained to me, and I fully understand them  The doctor has explained that, in my case, the benefits of transfer outweigh the risks  I agree to be transferred  I authorize the performance of emergency medical procedures and treatments upon me in both transit and upon arrival at the receiving facility  Additionally, I authorize the release of any and all medical records to the receiving facility and request they be transported with me, if possible  I understand that the safest mode of transportation during a medical emergency is an ambulance and that the Hospital advocates the use of this mode of transport   Risks of traveling to the receiving facility by car, including absence of medical control, life sustaining equipment, such as oxygen, and medical personnel has been explained to me and I fully understand them  (TITO CORRECT BOX BELOW)  [  ]  I consent to the stated transfer and to be transported by ambulance/helicopter  [  ]  I consent to the stated transfer, but refuse transportation by ambulance and accept full responsibility for my transportation by car  I understand the risks of non-ambulance transfers and I exonerate the Hospital and its staff from any deterioration in my condition that results from this refusal     X___________________________________________    DATE  21  TIME________  Signature of patient or legally responsible individual signing on patient behalf           RELATIONSHIP TO PATIENT_________________________          Provider Certification    NAME Gil Escoto                                        [facility] 10/15/1931                              MRN [id number]    A medical screening exam was performed on the above named patient  Based on the examination:    Condition Necessitating Transfer The primary encounter diagnosis was Syncope  Diagnoses of Hypercalcemia, Compression fracture of body of thoracic vertebra (HCC), and Lumbar compression fracture ([facility] [address] ) were also pertinent to this visit  Patient Condition:  An emergency transfer is being made prior to stabilization due to the need for definitive care and the benefit of transfer outweighs the risk, The patient has been stabilized such that within reasonable medical probability, no material deterioration of the patient condition or the condition of the unborn child(layla) is likely to result from the transfer    Reason for Transfer: Level of Care needed not available at this facility    Transfer Requirements: [facility]    · Space available and qualified personnel available for treatment as acknowledged by    · Agreed to accept transfer and to provide appropriate medical treatment as acknowledged by       Dr Crista Sandifer  · Appropriate medical records of the examination and treatment of the patient are provided at the time of transfer   [facility] _______  · Transfer will be performed by qualified personnel from    and appropriate transfer equipment as required, including the use of necessary and appropriate life support measures  Provider Certification: I have examined the patient and explained the following risks and benefits of being transferred/refusing transfer to the patient/family:  Unanticipated needs of medical equipment and personnel during transport, Risk of worsening condition, General risk, such as traffic hazards, adverse weather conditions, rough terrain or turbulence, possible failure of equipment (including vehicle or aircraft), or consequences of actions of persons outside the control of the transport personnel, The possibility of a transport vehicle being unavailable      Based on these reasonable risks and benefits to the patient and/or the unborn child(layla), and based upon the information available at the time of the patients examination, I certify that the medical benefits reasonably to be expected from the provision of appropriate medical treatments at another medical facility outweigh the increasing risks, if any, to the individuals medical condition, and in the case of labor to the unborn child, from effecting the transfer      X____________________________________________ DATE 08/25/21        TIME_______      ORIGINAL - SEND TO MEDICAL RECORDS   COPY - SEND WITH PATIENT DURING TRANSFER

## 2021-08-26 NOTE — TELEPHONE ENCOUNTER
Pt was fitted for TLSO brace today while supine as pt is on thoracic and lumbar spine precautions today, pt would not be able to have upright xrays done today  DE

## 2021-08-26 NOTE — ASSESSMENT & PLAN NOTE
· Family reports patient syncopized thing while in a chair    · Echo ordered  · EKG/troponin--negative  · IVF  · Telemetry

## 2021-08-26 NOTE — ED NOTES
Tele box available per Mike Huffman on [facility] [place] [facility]  08/26/21 8249

## 2021-08-26 NOTE — ASSESSMENT & PLAN NOTE
· No reported trauma  · Neurologically intact  · Neurosurgery consulted  · TLSO brace ordered  · Upright XR   · Multimodal pain regimen  · PT/OT

## 2021-08-26 NOTE — ASSESSMENT & PLAN NOTE
· Pt presented s/p syncope 8/25/21, no reported fall  · Found to have acute T7/T8, L3 and L5 compression fractures, TLICS 1    · Pt with chronic L2 compression fracture  · Imaging reviewed personally and by attending  Final results as below  · CT Thoracic and lumbar spine 8/26/21: Read based on S1 being lumbarized  New moderate wedge fracture deformities of T7, L3, L5 with severe fracture deformity of T8  No retropulsion  Plan  · Upright Xrays of Thoracic and lumbar spine ordered  Will review when completed  · Continue regular neurologic checks  · Pain control per primary team   · Eval and mobilize per PT/OT when able to  · DVT PPX:SCDs  · Spine precautions  Pt should have safety precautions to avoid further injury to their back  · TLSO brace to be worn when OOB, HOB > 45 degrees  · Recommend pt follow up outpt with PCP for eval of Osteoporosis, recommend DEXA scan as well as supplementation of Vit D/Calcium as needed  · No nsx intervention anticipated at this time  Recommend conservative mgt in brace and pain mgt  · Neurosurgery will review xrays when completed  Please call with any questions or concerns

## 2021-08-26 NOTE — PROGRESS NOTES
[facility]  Progress Note - John Knight 10/15/1931, 80 y o  female MRN: [id number]  Unit/Bed#: ED 24 Encounter: [id number]  Primary Care Provider: Marcus Boas, MD   Date and time admitted to hospital: 8/26/2021  2:10 AM    Syncope  Assessment & Plan  · Family reports patient syncopized thing while in a chair  · Echo ordered  · EKG/troponin--negative  · IVF  · Telemetry    Lower leg DVT (deep venous thromboembolism), acute, left ([facility] )  Assessment & Plan  · Family reports patient being largely immobile for the past 3 weeks  · Patient has decreased pulses in left lower leg  Patient is noted to have swelling in LLE  · Duplex completed and DVT was reported  Official read pending  · Started on heparin  · Due to decreased pulses, vascular surgery was consulted  · If no surgical intervention, patient will be transition from IV heparin to Eliquis  STEPHANIE (acute kidney injury) [facility])  Assessment & Plan  · Patient was found to have a creatinine of 2 24 on presentation  Improved today to 1 53   · Continue IVF  · Daily BMPs  · Hold nephrotoxic agents such as Lipitor    Hypercalcemia  Assessment & Plan  · Corrected calcium was 13 1 on presentation  · Nephrology consulted  · IVF initiated  · EKG troponin obtained    Closed compression fractures of L3, L5 lumbar vertebra, initial encounter ([place] [address] )  Assessment & Plan  · No reported trauma  · Neurologically intact  · Neurosurgery consulted  · TLSO brace ordered  · Upright XR   · Multimodal pain regimen  · PT/OT    Closed wedge compression fractures of T7, T8 vertebra (HCC)  Assessment & Plan  · No reported trauma  · Neurologically intact  · Neurosurgery consulted  · TLSO brace ordered  · Upright XR   · Multimodal pain regimen  · PT/OT    Hyperlipidemia  Assessment & Plan  · Hold home Lipitor    Essential hypertension with goal blood pressure less than 140/90  Assessment & Plan  · Hold home Cozaar due to STEPHANIE          TERTIARY TRAUMA SURVEY NOTE    Prophylaxis: Sequential compression device (Venodyne)  and Heparin    Disposition:  Continue current level of care    Code status:  Level 3 - DNAR and DNI    Consultants:  Geriatrics, PT/OT, vascular, nephro    Is the patient 72 years or older?: YES:    1  Before the illness or injury that brought you to the Emergency, did you need someone to help you on a regular basis? 1=Yes   2  Since the illness or injury that brought you to the Emergency, have you needed more help than usual to take care of yourself? 1=Yes   3  Have you been hospitalized for one or more nights during the past 6 months (excluding a stay in the Emergency Department)? 1=Yes   4  In general, do you see well? 0=Yes   5  In general, do you have serious problems with your memory? 0=No   6  Do you take more than three different medications everyday? 1=Yes   TOTAL   4     Did you order a geriatric consult if the score was 2 or greater?: yes    Identification of Seniors at Risk      Most Recent Value   (ISAR) Identification of Seniors at Risk   Before the illness or injury that brought you to the Emergency, did you need someone to help you on a regular basis? 0 Filed at: 08/26/2021 0221   In the last 24 hours, have you needed more help than usual?  0 Filed at: 08/26/2021 0221   Have you been hospitalized for one or more nights during the past 6 months? 1 Filed at: 08/26/2021 0221   In general, do you see well? 1 Filed at: 08/26/2021 0221   In general, do you have serious problems with your memory? 1 Filed at: 08/26/2021 0221   Do you take more than three different medications every day? 1 Filed at: 08/26/2021 0221   ISAR Score  4 Filed at: 08/26/2021 0221            SUBJECTIVE:     Transfer from:  [facility]  Outside Films Received: yes  Tertiary Exam Due on:  08/26/2021    Mechanism of Injury:Other:  No reported event    Syncope-no fall    Details related to Injury: +LOC:  yes    Chief Complaint:  Back and belly pain    HPI/Last 24 hour events: This is an 80-year-old female who is found to have compression fractures of T7, T8, L3, L5  There was no specific event or mechanism associated with these fractures  Family reports that patient has been largely bed bound for the past 3 weeks due to severe back and abdominal pain  She was brought to the ED yesterday for evaluation CT CAP revealed injuries listed below  Upon my evaluation this morning, patient continued to complain of abdomen and back pain  She denies any feelings of dizziness, lightheadedness, confusion, decreased sensation, numbness, tingling, shortness of breath, difficulty breathing, chest pain     She does know feeling tired and weak  A 12 point review of systems was completed and is negative not otherwise mentioned above      Active medications:           Current Facility-Administered Medications:     ALPRAZolam (XANAX) tablet 0 25 mg, 0 25 mg, Oral, BID PRN    cyclobenzaprine (FLEXERIL) tablet 10 mg, 10 mg, Oral, TID PRN, 10 mg at 08/26/21 0926    dicyclomine (BENTYL) capsule 20 mg, 20 mg, Oral, 4x Daily PRN    docusate sodium (COLACE) capsule 100 mg, 100 mg, Oral, BID    gabapentin (NEURONTIN) capsule 100 mg, 100 mg, Oral, BID, 100 mg at 08/26/21 0903    heparin (porcine) 25,000 units in 0 45% NaCl 250 mL infusion (premix), 3-30 Units/kg/hr (Order-Specific), Intravenous, Titrated, 18 Units/kg/hr at 08/26/21 1223    heparin (porcine) injection 2,000 Units, 2,000 Units, Intravenous, Q1H PRN    heparin (porcine) injection 4,000 Units, 4,000 Units, Intravenous, Q1H PRN    multi-electrolyte (PLASMALYTE-A/ISOLYTE-S PH 7 4) IV solution, 75 mL/hr, Intravenous, Once    pantoprazole (PROTONIX) EC tablet 40 mg, 40 mg, Oral, Early Morning, 40 mg at 08/26/21 0724    polyethylene glycol (MIRALAX) packet 17 g, 17 g, Oral, Daily    sucralfate (CARAFATE) tablet 1 g, 1 g, Oral, 4x Daily (AC & HS), 1 g at 08/26/21 0725    Current Outpatient Medications:     acetaminophen (TYLENOL) 325 mg tablet    atorvastatin (LIPITOR) 20 mg tablet    dicyclomine (BENTYL) 20 mg tablet    docusate sodium (COLACE) 100 mg capsule    gabapentin (NEURONTIN) 100 mg capsule    lidocaine (LIDODERM) 5 %    losartan (COZAAR) 50 mg tablet    pantoprazole (PROTONIX) 40 mg tablet    polyethylene glycol (MIRALAX) 17 g packet    potassium chloride (Klor-Con) 10 mEq tablet    sucralfate (CARAFATE) 1 g tablet    traMADol (ULTRAM) 50 mg tablet    Zoster Vac Recomb Adjuvanted 50 MCG/0 5ML SUSR      OBJECTIVE:     Vitals:   Vitals:    08/26/21 1300   BP: 137/81   Pulse: 88   Resp: 20   Temp:    SpO2: 96%       Physical Exam:   GENERAL APPEARANCE:  No acute distress  NEURO:  GCS is 15  Light touch sensation intact throughout  HEENT:  Normocephalic, atraumatic  Nontender  Neck is supple  PERRL  CV:  Regular rate and rhythm  No murmur, no rub, no gallop  Plus two radial pulses bilaterally  +1 bilateral lower extremity pulses  Left lower extremity is noted to have swelling and decreased capillary refill in comparison to right  LUNGS:  Clear to auscultation, bilaterally  No wheezing, no rhonchi, rales  GI:  Abdomen is soft nontender  :  Pelvis is stable  MSK:  Patient moves all extremities without focal weakness  No deformities and extremities noted  Patient has T and L-spine tenderness  No C-spine tenderness  No step-offs  SKIN:  Warm, dry    I/O:   I/O     None          Invasive Devices: Invasive Devices     Peripheral Intravenous Line            Peripheral IV 08/25/21 Left Antecubital <1 day    Peripheral IV 08/26/21 Right;Upper Arm <1 day    Peripheral IV 08/26/21 Right;Upper;Medial Arm <1 day                  Imaging:   No results found      Labs:   CBC:   Lab Results   Component Value Date    WBC 13 56 (H) 08/26/2021    HGB 13 0 08/26/2021    HCT 40 2 08/26/2021     (H) 08/26/2021     08/26/2021    MCH 33 1 08/26/2021    MCHC 32 3 08/26/2021    RDW 14 6 08/26/2021 MPV 11 2 08/26/2021    NRBC 0 08/26/2021     CMP:   Lab Results   Component Value Date     08/26/2021    CO2 25 08/26/2021    BUN 35 (H) 08/26/2021    CREATININE 1 53 (H) 08/26/2021    CALCIUM 10 9 (H) 08/26/2021    AST 22 08/25/2021    ALT 16 08/25/2021    ALKPHOS 240 (H) 08/25/2021    EGFR 30 08/26/2021     Phosphorus: No results found for: PHOS  Magnesium: No results found for: MG  Ionized Calcium: No results found for: CAION  Troponin:   Lab Results   Component Value Date    TROPONINI 0 02 08/26/2021

## 2021-08-26 NOTE — CONSULTS
Consultation - Vascular Surgery   Elizabeth Hess 80 y o  female MRN: [id number]  Unit/Bed#: ED 24 Encounter: [id number]      Assessment/Plan      Assessment:  80 y o  F with PMHx of CKD 3, HTN, presented after a syncopal event/fall with thoracic and lumbar spine fractures, also found to have a LLE DVT  LEVD: L- acute occlusive thrombus from R CFV to mid PT veins  R- no DVT    2+ pitting edema to LLE with doppler L PT signals     Plan:  No indication for acute vascular surgical intervention  Agree with anticoagulation  Can transition to oral AC once trauma workup is complete and primary team feels is appropriate  LLE elevation and compression  Recommend CTA PE  Remainder of care per primary team  Vascular Surgery will sign off at this time  Please don't hesitate to reach out with any further questions  History of Present Illness   Physician Requesting Consult: Shady Narayanan DO  Reason for Consult / Principal Problem: LLE DVT    History, ROS and PFSH are limited due to AMS  History is provided by patient's  and son  HPI: Elizabeth Hess is a 80y o  year old female with PMHx of HTN and GERD, who presented to [facility] ED after a witnessed syncopal event/fall  Workup revealed thoracic and lumbar fractures  Patient was transferred to [facility] for trauma evaluation and management  Trauma team noted LLE swelling which prompted LEVD which showed acute LLE DVT  Patient currently denies LLE pain  States she is comfortable  Per patient's son and , she was recently hospitalized a few months ago for abdominal and back pain  She had a colonoscopy and EGD performed at that time and per their report were unremarkable         Inpatient consult to Vascular Surgery  Consult performed by: Tosin Aguilar DO  Consult ordered by: EUNICE Ibanez          Review of Systems   Unable to perform ROS: Mental status change       Historical Information   Past Medical History:   Diagnosis Date    Chronic pain     Diverticulitis     GERD (gastroesophageal reflux disease)     Hypertension     Shingles     Shortness of breath      Past Surgical History:   Procedure Laterality Date    APPENDECTOMY      CHOLECYSTECTOMY      INCISION AND DRAINAGE RETROPHYARYNGEAL ABCESS      JOINT REPLACEMENT      PELVIC FRACTURE SURGERY      TOTAL HIP ARTHROPLASTY       Social History   Social History     Substance and Sexual Activity   Alcohol Use Never     Social History     Substance and Sexual Activity   Drug Use Never     E-Cigarette/Vaping    E-Cigarette Use Never User      E-Cigarette/Vaping Substances    Nicotine No     THC No     CBD No     Flavoring No     Other No     Unknown No      Social History     Tobacco Use   Smoking Status Never Smoker   Smokeless Tobacco Never Used     Family History: non-contributory}    Meds/Allergies   current meds:   Current Facility-Administered Medications   Medication Dose Route Frequency    ALPRAZolam (XANAX) tablet 0 25 mg  0 25 mg Oral BID PRN    cyclobenzaprine (FLEXERIL) tablet 10 mg  10 mg Oral TID PRN    dicyclomine (BENTYL) capsule 20 mg  20 mg Oral 4x Daily PRN    docusate sodium (COLACE) capsule 100 mg  100 mg Oral BID    gabapentin (NEURONTIN) capsule 100 mg  100 mg Oral BID    heparin (porcine) 25,000 units in 0 45% NaCl 250 mL infusion (premix)  3-30 Units/kg/hr (Order-Specific) Intravenous Titrated    heparin (porcine) injection 2,000 Units  2,000 Units Intravenous Q1H PRN    heparin (porcine) injection 4,000 Units  4,000 Units Intravenous Q1H PRN    multi-electrolyte (PLASMALYTE-A/ISOLYTE-S PH 7 4) IV solution  75 mL/hr Intravenous Once    pantoprazole (PROTONIX) EC tablet 40 mg  40 mg Oral Early Morning    polyethylene glycol (MIRALAX) packet 17 g  17 g Oral Daily    sucralfate (CARAFATE) tablet 1 g  1 g Oral 4x Daily (AC & HS)     Allergies   Allergen Reactions    Aloe Other (See Comments)     Fresh fruit has reaction to   She can have canned fruit    Banana - Food Allergy Other (See Comments)     Itchy throat    Naproxen Diarrhea    Oxycodone-Acetaminophen Hives       Objective   Vitals: Blood pressure 137/81, pulse 88, temperature 98 °F (36 7 °C), temperature source Oral, resp  rate 20, SpO2 96 %  ,There is no height or weight on file to calculate BMI  No intake or output data in the 24 hours ending 08/26/21 1437  Invasive Devices     Peripheral Intravenous Line            Peripheral IV 08/25/21 Left Antecubital <1 day    Peripheral IV 08/26/21 Right;Upper Arm <1 day    Peripheral IV 08/26/21 Right;Upper;Medial Arm <1 day                Physical Exam  Vitals and nursing note reviewed  Constitutional:       General: She is not in acute distress  Appearance: She is well-developed  HENT:      Head: Normocephalic  Eyes:      Conjunctiva/sclera: Conjunctivae normal    Cardiovascular:      Rate and Rhythm: Normal rate and regular rhythm  Comments: Doppler PT signals bilaterally  Pulmonary:      Effort: Pulmonary effort is normal  No respiratory distress  Abdominal:      Palpations: Abdomen is soft  Tenderness: There is no abdominal tenderness  There is no guarding or rebound  Musculoskeletal:      Cervical back: Neck supple  Comments: No calf tenderness  2+ pitting edema in the LLE  Skin:     General: Skin is warm and dry  Neurological:      Mental Status: She is alert  Comments: Oriented to person and place            Lab Results:   Coags:   Lab Results   Component Value Date    PTT 42 (H) 08/26/2021    INR 1 47 (H) 08/26/2021   , CBC with diff:   Lab Results   Component Value Date    WBC 13 56 (H) 08/26/2021    HGB 13 0 08/26/2021    HCT 40 2 08/26/2021     (H) 08/26/2021     08/26/2021    MCH 33 1 08/26/2021    MCHC 32 3 08/26/2021    RDW 14 6 08/26/2021    MPV 11 2 08/26/2021    NRBC 0 08/26/2021   , BMP/CMP:   Lab Results   Component Value Date    SODIUM 136 08/26/2021    K 3 7 08/26/2021  08/26/2021    CO2 25 08/26/2021    BUN 35 (H) 08/26/2021    CREATININE 1 53 (H) 08/26/2021    CALCIUM 10 9 (H) 08/26/2021    AST 22 08/25/2021    ALT 16 08/25/2021    ALKPHOS 240 (H) 08/25/2021    EGFR 30 08/26/2021     Imaging Studies: I have personally reviewed pertinent reports  EKG, Pathology, and Other Studies: I have personally reviewed pertinent reports  VTE Prophylaxis: Heparin     Code Status: Level 3 - DNAR and DNI  Advance Directive and Living Will:      Power of :    POLST:      Counseling / Coordination of Care  Counseling/Coordination of Care: Total floor / unit time spent today 30 minutes  Greater than 50% of total time was spent with the patient and / or family counseling and / or coordination of care   A description of the counseling / coordination of care: discussion with patient, patient's family and surgical team

## 2021-08-26 NOTE — ED NOTES
Plasmalyte on hold due to only 1 iv access and admitting team requesting heparin be started NOW  Admitting team aware of need for 2nd IV or possible PICC line  No new order received   Charge RN aware of need for second IV, will attempt with US guide     Wendy Greco, BRIDGER  08/26/21 1763

## 2021-08-26 NOTE — ASSESSMENT & PLAN NOTE
· Family reports patient being largely immobile for the past 3 weeks  · Patient has decreased pulses in left lower leg  Patient is noted to have swelling in LLE  · Duplex completed and DVT was reported  Official read pending  · Started on heparin  · Due to decreased pulses, vascular surgery was consulted  · If no surgical intervention, patient will be transition from IV heparin to Eliquis

## 2021-08-26 NOTE — ED NOTES
Admitting team aware of 2 IV attempts for blood draw and 2nd line due to medicaiton orders  Talked with admitting team about PICC line or US guided IV, no new orders for admitting team  Remain awaiting rounding on pt for update on results and plan of care to family        Cara Perales RN  08/26/21 7531

## 2021-08-26 NOTE — CONSULTS
Consultation - Geriatric Medicine   Marixa Arguello 80 y o  female MRN: [id number]  Unit/Bed#: ED 24 Encounter: [id number]      Assessment/Plan     Acute metabolic encephalopathy  -evidence by confusion and hallucinations in patient who is otherwise typically fully oriented and mildly forgetful at baseline  -likely multifactorial including severe back pain due to vertebral fractures, poor sleep, hypercalcemia and multiple setting changes in the past 24 hours in frail elderly individual  -[facility] on admission reports no acute intracranial abnormality  -although she is afebrile she does have leukocytosis which may be related to trauma or other any etiology given has no localizing infectious symptoms  -continue to monitor for metabolic derangements and correct as arise  -continue acute pain control-recommend geriatric pain protocol  -monitor for fecal and urinary retention  -maintain normal circadian rhythm  -reorient and redirect frequently as appropriate indicated  -family will be at bedside later this morning, appreciate family at bedside for familiarity and reassurance    Hypercalcemia  -calcium 11 9 corrected to 13 1  -suspect may be contributing to encephalopathy and constipation  -strongly encourage recommend IV fluid hydration, hold any calcium supplements, monitor renal function closely, recommend checking PTH and PTHrP, consider calcitonin and Nephrology consultation  -monitor mental status closely while further evaluation for etiology is in process    Syncope  -syncope versus presyncope symptoms described by family on admission  -likely multifactorial including but not limited to hypercalcemia, renal impairment, polypharmacy, and pain  -recommend monitored on telemetry and echocardiogram for further evaluation  -encourage good body mechanics with all transfers to reduce risk orthostatic hypotension    STEPHANIE superimposed on CKD-III  -creatinine elevated at 2 24 on admission up from 1 28 early June now down to 1 53 with hydration  -renally dose all medications and avoid nephrotoxic agents  -maintain normal tension  -recommend treatment acute hypercalcemia and monitor for additional metabolic derangements and correct as arise  -recommend Nephrology consultation    Thoracolumbar vertebral compression fractures  -CT chest abdomen pelvis on admission reveals moderate wedge fracture deformity of T7, L3, and L5 with severe deformity T8 and stool impacted through rectosigmoid colon  -recommend neurovascular checks  -thoracic and lumbar spine precautions  -encourage acute pain control per geriatric pain protocol  -Neurosurgery pending    Acute pain due to trauma  -recommend pain control per Geriatric pain protocol:  Tylenol 975mg Q8H scheduled  Roxicodone 2 5mg Q4H PRN moderate pain  Roxicodone 5mg Q4H PRN severe pain  Dilaudid 0 2mg Q4H PRN  -consider adjuncts such as lidocaine patch topically  -minimize use of sedating medications such as gabapentin while renal function is poor and mentation is fluctuating especially given hypercalciumia and need for clear Neuro exams  -encourage addition of non-pharmacologic pain treatment including ice and frequent repositioning  -strongly encourage bowel regimen to treat constipation    Constipation  -impacted stool noted on CT chest abdomen pelvis on admission  -encourage adequate hydration, and aggressive bowel regimen    Ambulatory dysfunction   -requires use of walker for ambulation at baseline however for past month has been mostly bed-bound due to severity of thoracic and lumbar pain  -encourage good body mechanics, assist with transfers, keep personal items close to reaching  -bed to lowest height with rails up and bed alarm on  -PT and OT eval pending    Cognitive screening  -patient is oriented only to self which is not baseline per family-typically fully oriented  -acute encephalopathy limiting current cognitive testing   -no prior cognitive testing on record for review and family reports no significant memory concerns  -CT head on admission reveals no acute intracranial abnormalities, on personal review at least moderate to moderate severe chronic microangiopathic changes are appreciated  -recommend checking TSH with FT4 and B12  -encourage use of sensory assist devices as appropriate to reduce risk of sensory deprivation contributing to social isolation, confusion, cognitive decline  -encourage patient remain physically, socially, and cognitively active engaged to maintain cognitive acuity     Impaired Vision  -recommend use of corrective lenses at all appropriate times  -encourage adequate lighting and encourage use of assistance with ambulation  -keep personal belongings close to person to avoid reaching  -encourage appropriate footwear at all times    Impaired Hearing  -mildly impaired hearing but does not use hearing assist device  -consider use of hearing amplifier to reduce straining to hear   -speak loudly and clearly using teach back method to ensure clear communication  -encourage providers and caregivers to speak slowly and clearly directly to patient  -minimize background noise to encourage patient engagement    Deconditioning/debility/frailty  -clinical frailty scale stage 6/7 moderate to severely frail  -multifactorial including CKD stage 3, hypertension, hyperlipidemia, depression now with hypercalcemia, vertebral compression fractures, and acute metabolic encephalopathy superimposed  -continue to address metabolic derangements as arise  -encourage well-balanced nutrition consider nutritional supplements between meals if oral intake is poor  -continue optimization chronic conditions  -continue to offer psychosocial support patient and family    Delirium precautions  -Patient is high risk of delirium at baseline due to age, fall, traumatic injury, and acute pain- presented with acute metabolic encephalopathy this admission  -continue delirium precautions  -maintain normal sleep/wake cycle  -minimize overnight interruptions, group overnight vitals/labs/nursing checks as possible  -dim lights, close blinds and turn off tv to minimize stimulation and encourage sleep environment in evenings  -ensure that pain is well controlled   -monitor for fecal and urinary retention which may precipitate delirium  -encourage early mobilization and ambulation once cleared by Neurosurgery and Trauma  -provide frequent reorientation and redirection as indicated and appropriate  -encourage family and friends at the bedside to help help calm patient if anxious as well as provide familiarity and reassurance  -minimize use of medications which may precipitate or worsen delirium such as tramadol, benzodiazepine, anticholinergics, and benadryl  -encourage hydration and nutrition   -redirect unwanted behaviors as first line    Home medication review     Will confirm with [phone] when open during regular business hours as not open at time of initial consultation- states will also bring list as pharmacy may not be as up-to-date given multiple recent hospitalizations and rehab stay  Care coordination:  Rounded with [place] (RN)     History of Present Illness   Physician Requesting Consult: Ryan Encinas DO  Reason for Consult / Principal Problem:  Fall  Hx and PE limited by:  Patient unreliable historian  Additional history obtained from: Chart review and patient evaluation, spoke with  Brittney Juaregui    HPI: Shai Schmidt is a 80y o  year old female with CKD-III, depression, hypertension, impaired vision mild age related forgetfulness and ambulatory dysfunction, she is admitted to the trauma service with pain and altered mental status following what family describes as syncopal/presyncopal episode    At time of evaluation patient is confused, having difficulty maintaining wakeful state, hallucinating stating that her  does across the room and bathtub and is speaking to people who were not in room  Given patient's current mental status additional HPI was obtained from her  [place] who explains that yesterday when family was visiting they assist the patient out of bed to a transfer chair to allow them to wash her bed sheets, when she got to the chair she slumped over and was not responsive for a very brief time  They report no fall or acute injuries and she was in her usual state health prior to the incident other than having ambulatory difficulty severe lower back pain limiting her ambulation for the past month  Her back pain started in early June, per family imaging studies are unremarkable and they were instructed to have patient enrolled with physical therapy, she went to [facility] for [employer] for short time however she did not have any improvement pain or ambulatory status and was discharged home in care family  Since that time she has been essentially bed-bound due to severity of pain and progressive lower extremity weakness  On admission imaging she was found to have multiple thoracic and lumbar spine compression fractures  Prior to admission Sue Harman was residing at home with her  who is her primary caregiver  She is typically awake alert and fully oriented although is mildly forgetful at times which family does not feel is out of proportion to other patients similar age  She has been nonambulatory for the past month due to the pain in her back, previously, ambulated with use of walker  She requires use of corrective lenses denies use of hearing aids or dentures  She requires assistance with ADLs and IADLs as well as medication management  She has no prior history of cognitive impairment or dementia  Inpatient consult to Gerontology  Consult performed by: Florentin Husain DO  Consult ordered by: Abbey Cee DO        Review of Systems   Constitutional: Negative for appetite change, chills and fever  HENT: Negative for dental problem      Eyes: Positive for visual disturbance (Wears glasses)  Respiratory: Negative for cough  Cardiovascular: Negative for chest pain  Gastrointestinal: Positive for nausea and vomiting  Negative for abdominal pain  Endocrine: Negative  Genitourinary: Negative  Musculoskeletal: Positive for arthralgias, back pain and gait problem  Skin: Negative  Allergic/Immunologic: Negative  Neurological: Positive for weakness  Hematological: Negative  Psychiatric/Behavioral: Positive for confusion and decreased concentration  Negative for sleep disturbance  All other systems reviewed and are negative  Historical Information   Past Medical History:   Diagnosis Date    Chronic pain     Diverticulitis     GERD (gastroesophageal reflux disease)     Hypertension     Shingles     Shortness of breath      Past Surgical History:   Procedure Laterality Date    APPENDECTOMY      CHOLECYSTECTOMY      INCISION AND DRAINAGE RETROPHYARYNGEAL ABCESS      JOINT REPLACEMENT      PELVIC FRACTURE SURGERY      TOTAL HIP ARTHROPLASTY       Social History   Social History     Substance and Sexual Activity   Alcohol Use Never     Social History     Substance and Sexual Activity   Drug Use Never     Social History     Tobacco Use   Smoking Status Never Smoker   Smokeless Tobacco Never Used     Family History:   Family History   Problem Relation Age of Onset    Stroke Mother      Meds/Allergies   all current active meds have been reviewed    Allergies   Allergen Reactions    Aloe Other (See Comments)     Fresh fruit has reaction to   She can have canned fruit    Banana - Food Allergy Other (See Comments)     Itchy throat    Naproxen Diarrhea    Oxycodone-Acetaminophen Hives     Objective   No intake or output data in the 24 hours ending 08/26/21 0857  Invasive Devices     Peripheral Intravenous Line            Peripheral IV 08/25/21 Left Antecubital <1 day              Physical Exam  Vitals and nursing note reviewed  Constitutional:       Comments: Frail elderly female lying in bed resting   HENT:      Head: Normocephalic and atraumatic  Nose: Nose normal       Mouth/Throat:      Mouth: Mucous membranes are dry  Comments: Dentition intact  Eyes:      General: No scleral icterus  Right eye: No discharge  Left eye: No discharge  Conjunctiva/sclera: Conjunctivae normal       Comments: Wearing corrective lenses at time of evaluation   Neck:      Comments: Phonation normal  Cardiovascular:      Rate and Rhythm: Tachycardia present  Rhythm irregular  Pulses: Normal pulses  Pulmonary:      Effort: Pulmonary effort is normal  No respiratory distress  Breath sounds: No wheezing  Abdominal:      General: Bowel sounds are normal  There is no distension  Palpations: Abdomen is soft  Tenderness: There is no abdominal tenderness  Musculoskeletal:      Cervical back: Neck supple  Right lower leg: No edema  Left lower leg: No edema  Comments: Mildly reduced overall muscle mass   Skin:     General: Skin is warm and dry  Coloration: Skin is pale  Neurological:      Mental Status: She is alert        Comments: Awake and alert, oriented to self, reports year as 1999, month as September and tells me that were going into the [place] season, reports reason for admission nausea and vomiting which were not reported and confirmed with family not initial presenting complaint    Psychiatric:      Comments: Pleasantly confused, cooperative       Lab Results:   I have personally reviewed pertinent lab results including the following:  -sodium 13 6, potassium 3 7 chloride 105, CO2 25, BUN 35, creatinine 1 53, GFR 30  -hemoglobin 13 5 hematocrit 41 3, WBC 14 59, platelets 408    I have personally reviewed the following imaging study reports in PACS:    CT head without contrast 8/25/21:  No acute intracranial abnormality  CT C-spine without contrast 8/25/21:  No cervical spine fracture or traumatic malalignment, degenerative disc change a C3-C7  CT chest and pelvis without contrast 8/25/21:  Moderate wedge fracture deformity T7, L3, L5 and severe wedge deformity T8    Therapies:   PT:  Pending  OT:  Pending    VTE Prophylaxis: Heparin    Code Status: Level 3 - DNAR and DNI  Advance Directive and Living Will:      Power of :    POLST:      Family and Social Support:       Goals of Care:  Patient cannot state due to acute metabolic encephalopathy

## 2021-08-26 NOTE — H&P
H&P Exam - Trauma   Elizabeth Hess 80 y o  female MRN: [id number]  Unit/Bed#: ED 24 Encounter: [id number]    Assessment/Plan   Trauma Alert: Other transfer  Model of Arrival: Ambulance  Trauma Team: Attending Katie Terrazas and Monique Nina  Consultants: Neurosurgery    Trauma Active Problems:   T7, T8, L3, and L5 compression fractures  Syncope  Swollen and tender left lower extremity    Trauma Plan:   Neurosurgery consult  Brace  Pain management as needed  PT/OT  Cardiac workup at [place] negative  Echo  Telemetry  Duplex ultrasound left lower extremity    Chief Complaint:  Compression fractures    History of Present Illness   HPI:  Elizabeth Hess is a 80 y o  female who presents as a transfer with T7, T8, L3, and L5 compression fractures  Patient lives at a nursing home and is bed bound at baseline  She recalls having a syncopal episode, but does not remember any kind of trauma  She says that she feels back to her baseline now  She reports no current lightheadedness, nausea, vomiting, or dizziness  She is not having any tenderness in her back  She has no weakness, numbness, or tingling  Mechanism:Other:  Possible syncope    Review of Systems    12-point, complete review of systems was reviewed and negative except as stated above  Historical Information   History is unobtainable from the patient due to poor historian    Efforts to obtain history included the following sources: obtained from other records    Past Medical History:   Diagnosis Date    Chronic pain     Diverticulitis     GERD (gastroesophageal reflux disease)     Hypertension     Shingles     Shortness of breath      Past Surgical History:   Procedure Laterality Date    APPENDECTOMY      CHOLECYSTECTOMY      INCISION AND DRAINAGE RETROPHYARYNGEAL ABCESS      JOINT REPLACEMENT      PELVIC FRACTURE SURGERY      TOTAL HIP ARTHROPLASTY       Social History   Social History     Substance and Sexual Activity   Alcohol Use Never Social History     Substance and Sexual Activity   Drug Use Never     Social History     Tobacco Use   Smoking Status Never Smoker   Smokeless Tobacco Never Used     E-Cigarette/Vaping    E-Cigarette Use Never User      E-Cigarette/Vaping Substances    Nicotine No     THC No     CBD No     Flavoring No     Other No     Unknown No      Immunization History   Administered Date(s) Administered    SARS-CoV-2 / COVID-19 mRNA IM (Moderna) 02/12/2021, 03/10/2021     Last Tetanus:  Unknown  Family History: Non-contributory        Meds/Allergies   all current active meds have been reviewed    Allergies   Allergen Reactions    Aloe Other (See Comments)     Fresh fruit has reaction to   She can have canned fruit    Banana - Food Allergy Other (See Comments)     Itchy throat    Naproxen Diarrhea    Oxycodone-Acetaminophen Hives         PHYSICAL EXAM    PE limited by:  None    Objective   Vitals:   First set: Pulse: 98 (08/26/21 0230)  Respirations: 16 (08/26/21 0230)  Blood Pressure: 139/84 (08/26/21 0230)    Primary Survey:   (A) Airway:  Intact  (B) Breathing:  Bilateral  (C) Circulation: Pulses:   pedal  2/4 and radial  2/4  (D) Disabliity:  GCS Total:  15  (E) Expose:  Completed    Secondary Survey: (Click on Physical Exam tab above)  Physical Exam  Vitals and nursing note reviewed  Constitutional:       Appearance: Normal appearance  HENT:      Head: Normocephalic and atraumatic  Eyes:      Conjunctiva/sclera: Conjunctivae normal       Pupils: Pupils are equal, round, and reactive to light  Cardiovascular:      Rate and Rhythm: Normal rate and regular rhythm  Pulses:           Radial pulses are 2+ on the right side and 2+ on the left side  Dorsalis pedis pulses are 2+ on the right side and 2+ on the left side  Pulmonary:      Effort: Pulmonary effort is normal  No respiratory distress  Breath sounds: No decreased breath sounds  Chest:      Chest wall: No tenderness     Abdominal: General: Abdomen is flat  There is no distension  Palpations: Abdomen is soft  Tenderness: There is no abdominal tenderness  Musculoskeletal:         General: Normal range of motion  Cervical back: Normal range of motion and neck supple  No tenderness or bony tenderness  Thoracic back: No bony tenderness  Lumbar back: No bony tenderness  Right hip: No bony tenderness  Left hip: No bony tenderness  Right lower leg: No edema  Left lower leg: Edema (2+ pitting and tender) present  Skin:     General: Skin is warm and dry  Neurological:      General: No focal deficit present  Mental Status: She is alert and oriented to person, place, and time  Invasive Devices     Peripheral Intravenous Line            Peripheral IV 08/25/21 Left Antecubital <1 day                Lab Results: Results: I have personally reviewed pertinent reports  Imaging/EKG Studies: Results: I have personally reviewed pertinent reports      Other Studies:  None    Code Status: Level 3 - DNAR and DNI  Advance Directive and Living Will:      Power of :    POLST:

## 2021-08-26 NOTE — CONSULTS
Consultation - Nephrology   Karan Hoyt 80 y o  female MRN: [id number]  Unit/Bed#: ED 24 Encounter: [id number]    ASSESSMENT/PLAN:   1  Hypercalcemia: corrected calcium 12 7 on admission abut already improving to 12 1 with IVF  Needs to rule out myeloma or other malignancy  Has had chronic hypercalcemia back to at least 2018 in [facility]   · CT C/A/P and CT head without signs of malignancy   · Will check SPEP, UPEP, FLC  · PTH, TSH and PTHrP ordered by primary team   · Check UPC ratio   · Currently getting 1L isolyte bolus and then will start isolyte at 75cc/h   · Check am BMP   2  STEPHANIE, POA: creatinine 2 24 on admission likely prerenal/hypercalcemia  Creatinine already improving down to 1 53 today with IVF   · UA pending   · CT with bilateral nephrolithiasis but no hydronephrosis   · Continue IVF   · Hold losartan for now   · Check am BMP   3  CKD III: baseline creatinine 0 9-1 1  4  Syncope: echo pending   5  T7, T8, L3 and L5 compression fractures   6  Hypertension: BP acceptable overall  Did have a high reading earlier likely related to pain   7  New DVT    HISTORY OF PRESENT ILLNESS:  Requesting Physician: Vivek Hughes DO  Reason for Consult: Hypercalcemia    Karan Hoyt is a 80y o  year old female who was admitted to [facility] with compression fractures  Patient is currently lethargic so history comes from the  at bedside and the chart  About 2 months ago the patient started with back pain and was undergoing physical therapy at the nursing home  However her back pain continued to worsen and for about the past month she has been unable to ambulate and has been bed-bound due to the severe pain  Yesterday the family was visiting and tried to transfer her from bed to chair and when patient got into the chair she had a brief unresponsive/syncopal episode    She went to [facility] and was found to have multiple compression fractures she was transferred to [address] [place] [address]  Steffi Perez for neurosurgical evaluation  On admission she was hypercalcemic and had acute kidney injury so Nephrology was consulted  According to the  she has has never been told she had hypercalcemia in the past   She does not use any calcium or vitamin-D supplements at home  She has not been eating or drinking well recently and also has been bed-bound  She complains of chronic nausea but has not had any recent vomiting or diarrhea    She has been more forgetful than usual       PAST MEDICAL HISTORY:  Past Medical History:   Diagnosis Date    Chronic pain     Diverticulitis     GERD (gastroesophageal reflux disease)     Hypertension     Shingles     Shortness of breath        PAST SURGICAL HISTORY:  Past Surgical History:   Procedure Laterality Date    APPENDECTOMY      CHOLECYSTECTOMY      INCISION AND DRAINAGE RETROPHYARYNGEAL ABCESS      JOINT REPLACEMENT      PELVIC FRACTURE SURGERY      TOTAL HIP ARTHROPLASTY         ALLERGIES:  Allergies   Allergen Reactions    Aloe Other (See Comments)     Fresh fruit has reaction to   She can have canned fruit    Banana - Food Allergy Other (See Comments)     Itchy throat    Naproxen Diarrhea    Oxycodone-Acetaminophen Hives       SOCIAL HISTORY:  Social History     Substance and Sexual Activity   Alcohol Use Never     Social History     Substance and Sexual Activity   Drug Use Never     Social History     Tobacco Use   Smoking Status Never Smoker   Smokeless Tobacco Never Used       FAMILY HISTORY:  Family History   Problem Relation Age of Onset    Stroke Mother        MEDICATIONS:  Scheduled Meds:  Current Facility-Administered Medications   Medication Dose Route Frequency Provider Last Rate    ALPRAZolam  0 25 mg Oral BID PRN Lion Mcnair, DO      cyclobenzaprine  10 mg Oral TID PRN Juanita Haheavenlym, DO      dicyclomine  20 mg Oral 4x Daily PRN Juanita Haheavenlym, DO      docusate sodium  100 mg Oral BID Juanita Colem, DO      gabapentin  100 mg Oral BID Trey Hearing, DO      heparin (porcine)  3-30 Units/kg/hr (Order-Specific) Intravenous Titrated Duke Blayne, CRNP 18 Units/kg/hr (08/26/21 1223)    heparin (porcine)  2,000 Units Intravenous Q1H PRN Duke Blayne, CRNP      heparin (porcine)  4,000 Units Intravenous Q1H PRN Duke Blayne, CRNP      multi-electrolyte  1,000 mL Intravenous Once [place] Holter, CRNP      multi-electrolyte  100 mL/hr Intravenous Once Alva Primas [place], CRNP      pantoprazole  40 mg Oral Early Morning Juanita Hakim, DO      polyethylene glycol  17 g Oral Daily Juanita Hakim, DO      sucralfate  1 g Oral 4x Daily (AC & HS) Juanita Hakim, DO         PRN Meds:   ALPRAZolam    cyclobenzaprine    dicyclomine    heparin (porcine)    heparin (porcine)    Continuous Infusions:heparin (porcine), 3-30 Units/kg/hr (Order-Specific), Last Rate: 18 Units/kg/hr (08/26/21 1223)        REVIEW OF SYSTEMS:  A complete review of systems was done  Pertinent positives and negatives noted in the HPI but otherwise the review of systems is negative      PHYSICAL EXAM:  Current Weight:    First Weight:    Vitals:    08/26/21 1215   BP: 149/79   Pulse: 80   Resp: 15   Temp:    SpO2: 95%     General:  No acute distress  Skin:  No rash  Eyes:  Sclerae anicteric  ENT:  dry mucous membranes  Neck:  Trachea midline   Chest:  Clear to auscultation bilaterally  CVS:  Regular rate and rhythm  Abdomen:  Soft, nontender, nondistended  Extremities:  Trace edema   Neuro:  Lethargic but awakens and follows commands  Psych:  Cooperative     Lab Results:   Results from last 7 days   Lab Units 08/26/21  1206 08/26/21  0438 08/25/21  1954   WBC Thousand/uL 13 56* 14 59* 17 59*   HEMOGLOBIN g/dL 13 0 13 5 13 6   HEMATOCRIT % 40 2 41 3 42 0   PLATELETS Thousands/uL 172 197 202   SODIUM mmol/L  --  136 136   POTASSIUM mmol/L  --  3 7 4 3   CHLORIDE mmol/L  --  105 102   CO2 mmol/L  --  25 26   BUN mg/dL  --  35* 33*   CREATININE mg/dL  --  1 53* 2 24*   CALCIUM mg/dL --  11 3* 11 9*       Radiology Results:   VAS lower limb venous duplex study, unilateral/limited    (Results Pending)   XR spine thoracic 3 vw    (Results Pending)   XR spine lumbar 2 or 3 views injury    (Results Pending)

## 2021-08-26 NOTE — ASSESSMENT & PLAN NOTE
· Corrected calcium was 13 1 on presentation  · Nephrology consulted  · IVF initiated  · EKG troponin obtained

## 2021-08-26 NOTE — CONSULTS
[address] 10/15/1931, 80 y o  female MRN: [id number]  Unit/Bed#: ED 24 Encounter: [id number]  Primary Care Provider: Alissa Aaron MD   Date and time admitted to hospital: 8/26/2021  2:10 AM    Inpatient consult to Neurosurgery  Consult performed by: Cornel Krabbe, PA-C  Consult ordered by: Willie Rascon DO          Closed wedge compression fractures of T7, T8 vertebra ([address] )  Assessment & Plan  · Pt presented s/p syncope 8/25/21, no reported fall  · Found to have acute T7/T8, L3 and L5 compression fractures, TLICS 1    · Pt with chronic L2 compression fracture  · Imaging reviewed personally and by attending  Final results as below  · CT Thoracic and lumbar spine 8/26/21: Read based on S1 being lumbarized  New moderate wedge fracture deformities of T7, L3, L5 with severe fracture deformity of T8  No retropulsion  Plan  · Upright Xrays of Thoracic and lumbar spine ordered  Will review when completed  · Continue regular neurologic checks  · Pain control per primary team   · Eval and mobilize per PT/OT when able to  · DVT PPX:SCDs  · Spine precautions  Pt should have safety precautions to avoid further injury to their back  · TLSO brace to be worn when OOB, HOB > 45 degrees  · Recommend pt follow up outpt with PCP for eval of Osteoporosis, recommend DEXA scan as well as supplementation of Vit D/Calcium as needed  · No nsx intervention anticipated at this time  Recommend conservative mgt in brace and pain mgt  · Neurosurgery will review xrays when completed  Please call with any questions or concerns  Closed compression fractures of L3, L5 lumbar vertebra, initial encounter [facility])  Assessment & Plan  See plan above  Attending discussed patient and reviewed images during morning rounds on 08/26/2021 at 7:00 a m  Patient personally accessed and examined along with images reviewed on 06/20/2021 at 11:00 a m       History of Present Illness   History, ROS and PFSH obtained from patient and chart review  HPI: Linda Stockton is a 80 y o  female with PMH including hypertension, gastroesophageal reflux, chronic pain who presented to the ED status post syncope  Per patient's son and  who were at bedside, patient had syncope but did not fall  Per report, patient has not had any recent falls  Today patient reports having low back pain as well as mid abdominal discomfort  Patient admits to generalized weakness  Patient denies any new numbness or tingling bilateral arms or legs  Per family report, patient chronically uses depends due to incontinence of bladder and bowel  Per report, patient typically uses a wheelchair and does not walk  Per family report, patient was following up with neurosurgeon at [facility] for her back pain and has upcoming apt  Review of Systems   Constitutional: Negative for chills and fever  HENT: Negative for hearing loss  Eyes: Negative for pain and visual disturbance  Respiratory: Negative for chest tightness and shortness of breath  Cardiovascular: Negative for chest pain and palpitations  Gastrointestinal: Positive for abdominal pain  Negative for nausea and vomiting  Genitourinary: Negative for dysuria  Musculoskeletal: Positive for back pain and gait problem  Negative for neck pain  Skin: Negative for pallor and rash  Neurological: Positive for weakness  Negative for dizziness, light-headedness and numbness  Psychiatric/Behavioral: Positive for decreased concentration  Negative for agitation         Historical Information   Past Medical History:   Diagnosis Date    Chronic pain     Diverticulitis     GERD (gastroesophageal reflux disease)     Hypertension     Shingles     Shortness of breath      Past Surgical History:   Procedure Laterality Date    APPENDECTOMY      CHOLECYSTECTOMY      INCISION AND DRAINAGE RETROPHYARYNGEAL ABCESS      JOINT REPLACEMENT      PELVIC FRACTURE SURGERY      TOTAL HIP ARTHROPLASTY       Social History     Substance and Sexual Activity   Alcohol Use Never     Social History     Substance and Sexual Activity   Drug Use Never     Social History     Tobacco Use   Smoking Status Never Smoker   Smokeless Tobacco Never Used     Family History   Problem Relation Age of Onset    Stroke Mother        Meds/Allergies   all current active meds have been reviewed, current meds:   Current Facility-Administered Medications   Medication Dose Route Frequency    ALPRAZolam (XANAX) tablet 0 25 mg  0 25 mg Oral BID PRN    cyclobenzaprine (FLEXERIL) tablet 10 mg  10 mg Oral TID PRN    dicyclomine (BENTYL) capsule 20 mg  20 mg Oral 4x Daily PRN    docusate sodium (COLACE) capsule 100 mg  100 mg Oral BID    gabapentin (NEURONTIN) capsule 100 mg  100 mg Oral BID    heparin (porcine) 25,000 units in 0 45% NaCl 250 mL infusion (premix)  3-30 Units/kg/hr (Order-Specific) Intravenous Titrated    heparin (porcine) injection 2,000 Units  2,000 Units Intravenous Q1H PRN    heparin (porcine) injection 4,000 Units  4,000 Units Intravenous Q1H PRN    pantoprazole (PROTONIX) EC tablet 40 mg  40 mg Oral Early Morning    polyethylene glycol (MIRALAX) packet 17 g  17 g Oral Daily    sucralfate (CARAFATE) tablet 1 g  1 g Oral 4x Daily (AC & HS)    and PTA meds:   Prior to Admission Medications   Prescriptions Last Dose Informant Patient Reported? Taking?    Zoster Vac Recomb Adjuvanted 50 MCG/0 5ML SUSR  Spouse/Significant Other Yes No   Sig: Inject 0 5 mL into a large muscle now and repeat dose in 60 to 180 days   Patient not taking: Reported on 2021   acetaminophen (TYLENOL) 325 mg tablet  Spouse/Significant Other Yes No   Sig: Take 325 mg by mouth    atorvastatin (LIPITOR) 20 mg tablet  Spouse/Significant Other Yes No   Si by mouth daily   dicyclomine (BENTYL) 20 mg tablet   No No   Sig: TAKE 1 TABLET BY MOUTH EVERY 6 HOURS AS NEEDED (ABDOMINAL PAIN) docusate sodium (COLACE) 100 mg capsule   No No   Si po at bedtime   Patient not taking: Reported on 2021   gabapentin (NEURONTIN) 100 mg capsule   Yes No   Sig: Take 100 mg by mouth 2 (two) times a day   lidocaine (LIDODERM) 5 %  Self Yes No   Sig: Apply 1 patch topically daily Remove & Discard patch within 12 hours or as directed by MD   losartan (COZAAR) 50 mg tablet  Spouse/Significant Other Yes No   Sig: Take 50 mg by mouth daily   pantoprazole (PROTONIX) 40 mg tablet  Spouse/Significant Other Yes No   polyethylene glycol (MIRALAX) 17 g packet   No No   Sig: Take 17 g by mouth daily   potassium chloride (Klor-Con) 10 mEq tablet  Spouse/Significant Other Yes No   Sig: Take 10 mEq by mouth daily   sucralfate (CARAFATE) 1 g tablet  Spouse/Significant Other Yes No   Sig: TAKE 1 TAB BY MOUTH 4 TIMES A DAY BEFORE MEALS AND AT BEDTIME    traMADol (ULTRAM) 50 mg tablet  Spouse/Significant Other Yes No   Sig: Take 50 mg by mouth      Facility-Administered Medications: None     Allergies   Allergen Reactions    Aloe Other (See Comments)     Fresh fruit has reaction to   She can have canned fruit    Banana - Food Allergy Other (See Comments)     Itchy throat    Naproxen Diarrhea    Oxycodone-Acetaminophen Hives       Objective   I/O     None          Physical Exam  Constitutional:       General: She is not in acute distress  Appearance: She is well-developed  HENT:      Head: Normocephalic and atraumatic  Eyes:      Pupils: Pupils are equal, round, and reactive to light  Neck:      Trachea: No tracheal deviation  Cardiovascular:      Rate and Rhythm: Normal rate  Pulmonary:      Effort: Pulmonary effort is normal    Abdominal:      Palpations: Abdomen is soft  Tenderness: There is no abdominal tenderness  There is no guarding  Musculoskeletal:      Cervical back: Neck supple  Skin:     General: Skin is warm and dry  Coloration: Skin is not pale  Findings: No rash  Neurological:      Mental Status: She is alert  Comments: A bit drowy but easily arousable, No acute distress  Follows commands in all extremities though need additional prompting due to drowsiness  Motor: GRANADOS, strength BUE 4/5, RLE 4-/5, LLE: HF/KF/DF/PF 3-4/5  Sensation:  intact to LT X 4, Good proprioception  Coordination: no drift bilateral upper extremities   Psychiatric:         Mood and Affect: Mood normal          Speech: Speech normal        Neurologic Exam     Mental Status   Attention: decreased  Concentration: decreased  Speech: speech is normal   Level of consciousness: drowsy  Normal comprehension  Cranial Nerves   Cranial nerves II through XII intact  CN III, IV, VI   Pupils are equal, round, and reactive to light  Motor Exam   Muscle bulk: decreased  Overall muscle tone: normal        Vitals:Blood pressure (!) 177/89, pulse 74, temperature 98 °F (36 7 °C), temperature source Oral, resp  rate 20, SpO2 96 %  ,There is no height or weight on file to calculate BMI  Lab Results:   Results from last 7 days   Lab Units 08/26/21  1206 08/26/21  0438 08/25/21 1954   WBC Thousand/uL 13 56* 14 59* 17 59*   HEMOGLOBIN g/dL 13 0 13 5 13 6   HEMATOCRIT % 40 2 41 3 42 0   PLATELETS Thousands/uL 172 197 202   NEUTROS PCT %  --  78* 79*   MONOS PCT %  --  10 11     Results from last 7 days   Lab Units 08/26/21  1206 08/26/21  0438 08/25/21 1954   POTASSIUM mmol/L  --  3 7 4 3   CHLORIDE mmol/L  --  105 102   CO2 mmol/L  --  25 26   BUN mg/dL  --  35* 33*   CREATININE mg/dL  --  1 53* 2 24*   CALCIUM mg/dL 10 9* 11 3* 11 9*   ALK PHOS U/L  --   --  240*   ALT U/L  --   --  16   AST U/L  --   --  22             Results from last 7 days   Lab Units 08/26/21  1209 08/25/21 1954   INR  1 47* 0 99   PTT seconds 42* 28     Imaging Studies: I have personally reviewed pertinent reports     and I have personally reviewed pertinent films in PACS       EKG, Pathology, and Other Studies: I have personally reviewed pertinent reports  VTE Prophylaxis: Sequential compression device Naye Gonzalez     Code Status: Level 3 - DNAR and DNI  Advance Directive and Living Will:      Power of :    POLST:      Counseling / Coordination of Care  I spent 45 minutes with the patient  PLEASE NOTE:  This encounter may have been completed utilizing the M- [employer]Mobile [employer] Direct Speech Voice Recognition Software  Grammatical errors, random word insertions, pronoun errors and incomplete sentences are occasional consequences of the system due to software limitations, ambient noise and hardware issues  These may be missed even after proof reading prior to affixing electronic signature  Please do not hesitate to contact me directly if you have any questions or concerns about the content, text or information contained within the body of this dictation

## 2021-08-26 NOTE — RESTORATIVE TECHNICIAN NOTE
Restorative Technician Note      Patient Name: Jaciel Kendall     Note Type: Bracing, Initial consult  Patient Position Upon Consult: Supine  Brace Applied: [employer] 456 Back Pack TLSO  Additional Brace Ordered: No  Patient Position When Brace Applied: Supine  Bracing Recommendations: None  Education Provided: Yes  Patient Position at End of Consult: Supine  Nurse Communication: Nurse aware of consult, application of brace      Utilized long roll to don brace on pt  Pt resting comfortably has no questions at this time  Please call Mobility Coordinator at ext  [phone] in regards to bracing instruction and/or adjustment    Jackie Barrett Restorative Technician, BS

## 2021-08-26 NOTE — EMTALA/ACUTE CARE TRANSFER
[address] [facility] [address]  [address] [facility] [address]  Dept: [phone]      OKXWLJ TRANSFER CONSENT    NAME Court Henry                                         10/15/1931                              MRN [id number]    I have been informed of my rights regarding examination, treatment, and transfer   by Dr Rick Vines DO    Benefits:      Risks:        Consent for Transfer:  I acknowledge that my medical condition has been evaluated and explained to me by the emergency department physician or other qualified medical person and/or my attending physician, who has recommended that I be transferred to the service of    at    The above potential benefits of such transfer, the potential risks associated with such transfer, and the probable risks of not being transferred have been explained to me, and I fully understand them  The doctor has explained that, in my case, the benefits of transfer outweigh the risks  I agree to be transferred  I authorize the performance of emergency medical procedures and treatments upon me in both transit and upon arrival at the receiving facility  Additionally, I authorize the release of any and all medical records to the receiving facility and request they be transported with me, if possible  I understand that the safest mode of transportation during a medical emergency is an ambulance and that the Hospital advocates the use of this mode of transport  Risks of traveling to the receiving facility by car, including absence of medical control, life sustaining equipment, such as oxygen, and medical personnel has been explained to me and I fully understand them  (TITO CORRECT BOX BELOW)  [  ]  I consent to the stated transfer and to be transported by ambulance/helicopter  [  ]  I consent to the stated transfer, but refuse transportation by ambulance and accept full responsibility for my transportation by car    I understand the risks of non-ambulance transfers and I exonerate the Hospital and its staff from any deterioration in my condition that results from this refusal     X___________________________________________    DATE  21  TIME________  Signature of patient or legally responsible individual signing on patient behalf           RELATIONSHIP TO PATIENT_________________________          Provider Certification    NAME Orlin Opitz                                         10/15/1931                              MRN [id number]    A medical screening exam was performed on the above named patient  Based on the examination:    Condition Necessitating Transfer The primary encounter diagnosis was Syncope  Diagnoses of Hypercalcemia, Compression fracture of body of thoracic vertebra (HCC), and Lumbar compression fracture ([facility] [address] ) were also pertinent to this visit  Patient Condition:      Reason for Transfer:      Transfer Requirements: Facility     · Space available and qualified personnel available for treatment as acknowledged by    · Agreed to accept transfer and to provide appropriate medical treatment as acknowledged by          · Appropriate medical records of the examination and treatment of the patient are provided at the time of transfer   [address] [facility], [address] _______  · Transfer will be performed by qualified personnel from    and appropriate transfer equipment as required, including the use of necessary and appropriate life support measures      Provider Certification: I have examined the patient and explained the following risks and benefits of being transferred/refusing transfer to the patient/family:         Based on these reasonable risks and benefits to the patient and/or the unborn child(layla), and based upon the information available at the time of the patients examination, I certify that the medical benefits reasonably to be expected from the provision of appropriate medical treatments at another [facility] facility outweigh the increasing risks, if any, to the individuals medical condition, and in the case of labor to the unborn child, from effecting the transfer      X____________________________________________ DATE 08/25/21        TIME_______      ORIGINAL - SEND TO MEDICAL RECORDS   COPY - SEND WITH PATIENT DURING TRANSFER

## 2021-08-26 NOTE — ASSESSMENT & PLAN NOTE
· Pt presented s/p syncope and fall 8/25/21  Found to have acute T7/T8, L3 and L5 compression fractures, TLICS 1    · Pt with chronic L2 compression fracture  · Imaging reviewed personally and by attending  Final results as below  · CT Thoracic and lumbar spine 8/26/21: Read based on S1 being lumbarized  New moderate wedge fracture deformities of T7, L3, L5 with severe fracture deformity of T8  No retropulsion  Plan  · Upright Xrays of Thoracic and lumbar spine ordered  Will review when completed  · Continue regular neurologic checks  · Pain control per primary team   · Eval and mobilize per PT/OT when able to  · DVT PPX:SCDs  · Spine precautions  Pt should have safety precautions to avoid further injury to their back  · TLSO brace to be worn when OOB, HOB > 45 degrees  · Neurosurgery will review xrays when completed  Please call with any questions or concerns

## 2021-08-26 NOTE — PLAN OF CARE
Problem: Potential for Falls  Goal: Patient will remain free of falls  Description: INTERVENTIONS:  - Educate patient/family on patient safety including physical limitations  - Instruct patient to call for assistance with activity   - Consult OT/PT to assist with strengthening/mobility   - Keep Call bell within reach  - Keep bed low and locked with side rails adjusted as appropriate  - Keep care items and personal belongings within reach  - Initiate and maintain comfort rounds  - Make Fall Risk Sign visible to staff  - Initiate/Maintain bed alarm  - Apply yellow socks and bracelet for high fall risk patients  - Consider moving patient to room near nurses station  Outcome: Progressing     Problem: NEUROSENSORY - ADULT  Goal: Achieves stable or improved neurological status  Description: INTERVENTIONS  - Monitor and report changes in neurological status  - Monitor vital signs such as temperature, blood pressure, glucose, and any other labs ordered   - Initiate measures to prevent increased intracranial pressure  - Monitor for seizure activity and implement precautions if appropriate      Outcome: Progressing     Problem: METABOLIC, FLUID AND ELECTROLYTES - ADULT  Goal: Electrolytes maintained within normal limits  Description: INTERVENTIONS:  - Monitor labs and assess patient for signs and symptoms of electrolyte imbalances  - Administer electrolyte replacement as ordered  - Monitor response to electrolyte replacements, including repeat lab results as appropriate  - Instruct patient on fluid and nutrition as appropriate  Outcome: Progressing     Problem: SKIN/TISSUE INTEGRITY - ADULT  Goal: Skin Integrity remains intact(Skin Breakdown Prevention)  Description: Assess:  -Perform Willie assessment every shift    -Clean and moisturize skin every 4 hours  -Inspect skin when repositioning, toileting, and assisting with ADLS    -Assess extremities for adequate circulation and sensation     Bed Management:  -Have minimal linens on bed & keep smooth, unwrinkled  -Change linens as needed when moist or perspiring      Toileting:  -Offer bedside commode  -Assess for incontinence every 2 hours  -Use incontinent care products after each incontinent episode     Activity:  -Encourage activity and walks on unit  -Encourage or provide ROM exercises   -Turn and reposition patient every 2 Hours    Skin Care:  -Avoid use of baby powder, tape, friction and shearing, hot water or constrictive clothing  -Relieve pressure over bony prominences using foam wedges    -Do not massage red bony areas    Next Steps:  Outcome: Progressing  Goal: Pressure injury heals and does not worsen  Description: Interventions:  - Implement low air loss mattress or specialty surface (Criteria met)  - Apply silicone foam dressing  - Apply fecal or urinary incontinence containment device   - Turn and reposition patient & offload bony prominences every 2 hours   - Utilize friction reducing device or surface for transfers   - Use incontinent care products after each incontinent episode   - Consider nutrition services referral as needed  Outcome: Progressing

## 2021-08-26 NOTE — ASSESSMENT & PLAN NOTE
· Patient was found to have a creatinine of 2 24 on presentation    Improved today to 1 53   · Continue IVF  · Daily BMPs  · Hold nephrotoxic agents such as Lipitor

## 2021-08-27 ENCOUNTER — TELEPHONE (OUTPATIENT)
Dept: RADIOLOGY | Facility: HOSPITAL | Age: 86
End: 2021-08-27

## 2021-08-27 PROBLEM — I26.99 PULMONARY EMBOLISM (HCC): Status: ACTIVE | Noted: 2021-08-27

## 2021-08-27 LAB
ALBUMIN SERPL BCP-MCNC: 2.4 G/DL (ref 3.5–5)
ALP SERPL-CCNC: 179 U/L (ref 46–116)
ALT SERPL W P-5'-P-CCNC: 11 U/L (ref 12–78)
ANION GAP SERPL CALCULATED.3IONS-SCNC: 4 MMOL/L (ref 4–13)
ANION GAP SERPL CALCULATED.3IONS-SCNC: 6 MMOL/L (ref 4–13)
APTT PPP: 133 SECONDS (ref 23–37)
APTT PPP: 71 SECONDS (ref 23–37)
APTT PPP: 71 SECONDS (ref 23–37)
APTT PPP: >210 SECONDS (ref 23–37)
AST SERPL W P-5'-P-CCNC: 15 U/L (ref 5–45)
ATRIAL RATE: 83 BPM
ATRIAL RATE: 86 BPM
ATRIAL RATE: 92 BPM
BASOPHILS # BLD AUTO: 0.06 THOUSANDS/ΜL (ref 0–0.1)
BASOPHILS NFR BLD AUTO: 1 % (ref 0–1)
BILIRUB SERPL-MCNC: 0.71 MG/DL (ref 0.2–1)
BUN SERPL-MCNC: 30 MG/DL (ref 5–25)
BUN SERPL-MCNC: 30 MG/DL (ref 5–25)
CA-I BLD-SCNC: 1.4 MMOL/L (ref 1.12–1.32)
CALCIUM ALBUM COR SERPL-MCNC: 11.9 MG/DL (ref 8.3–10.1)
CALCIUM SERPL-MCNC: 10.6 MG/DL (ref 8.3–10.1)
CALCIUM SERPL-MCNC: 10.6 MG/DL (ref 8.3–10.1)
CHLORIDE SERPL-SCNC: 105 MMOL/L (ref 100–108)
CHLORIDE SERPL-SCNC: 105 MMOL/L (ref 100–108)
CO2 SERPL-SCNC: 24 MMOL/L (ref 21–32)
CO2 SERPL-SCNC: 26 MMOL/L (ref 21–32)
CREAT SERPL-MCNC: 0.74 MG/DL (ref 0.6–1.3)
CREAT SERPL-MCNC: 0.87 MG/DL (ref 0.6–1.3)
EOSINOPHIL # BLD AUTO: 0.04 THOUSAND/ΜL (ref 0–0.61)
EOSINOPHIL NFR BLD AUTO: 0 % (ref 0–6)
ERYTHROCYTE [DISTWIDTH] IN BLOOD BY AUTOMATED COUNT: 14.5 % (ref 11.6–15.1)
GFR SERPL CREATININE-BSD FRML MDRD: 59 ML/MIN/1.73SQ M
GFR SERPL CREATININE-BSD FRML MDRD: 72 ML/MIN/1.73SQ M
GLUCOSE P FAST SERPL-MCNC: 72 MG/DL (ref 65–99)
GLUCOSE SERPL-MCNC: 72 MG/DL (ref 65–140)
GLUCOSE SERPL-MCNC: 81 MG/DL (ref 65–140)
HCT VFR BLD AUTO: 34.2 % (ref 34.8–46.1)
HGB BLD-MCNC: 11.3 G/DL (ref 11.5–15.4)
IMM GRANULOCYTES # BLD AUTO: 0.11 THOUSAND/UL (ref 0–0.2)
IMM GRANULOCYTES NFR BLD AUTO: 1 % (ref 0–2)
KAPPA LC FREE SER-MCNC: 122.8 MG/L (ref 3.3–19.4)
KAPPA LC FREE/LAMBDA FREE SER: 6.14 {RATIO} (ref 0.26–1.65)
LAMBDA LC FREE SERPL-MCNC: 20 MG/L (ref 5.7–26.3)
LYMPHOCYTES # BLD AUTO: 1.4 THOUSANDS/ΜL (ref 0.6–4.47)
LYMPHOCYTES NFR BLD AUTO: 12 % (ref 14–44)
MAGNESIUM SERPL-MCNC: 1.9 MG/DL (ref 1.6–2.6)
MCH RBC QN AUTO: 32.6 PG (ref 26.8–34.3)
MCHC RBC AUTO-ENTMCNC: 33 G/DL (ref 31.4–37.4)
MCV RBC AUTO: 99 FL (ref 82–98)
MONOCYTES # BLD AUTO: 1.27 THOUSAND/ΜL (ref 0.17–1.22)
MONOCYTES NFR BLD AUTO: 11 % (ref 4–12)
NEUTROPHILS # BLD AUTO: 9.21 THOUSANDS/ΜL (ref 1.85–7.62)
NEUTS SEG NFR BLD AUTO: 75 % (ref 43–75)
NRBC BLD AUTO-RTO: 0 /100 WBCS
P AXIS: 44 DEGREES
P AXIS: 48 DEGREES
P AXIS: 52 DEGREES
PHOSPHATE SERPL-MCNC: 1.8 MG/DL (ref 2.3–4.1)
PLATELET # BLD AUTO: 187 THOUSANDS/UL (ref 149–390)
PMV BLD AUTO: 11.1 FL (ref 8.9–12.7)
POTASSIUM SERPL-SCNC: 3.3 MMOL/L (ref 3.5–5.3)
POTASSIUM SERPL-SCNC: 3.6 MMOL/L (ref 3.5–5.3)
PR INTERVAL: 150 MS
PR INTERVAL: 150 MS
PR INTERVAL: 154 MS
PROT SERPL-MCNC: 6.9 G/DL (ref 6.4–8.2)
QRS AXIS: -19 DEGREES
QRS AXIS: -20 DEGREES
QRS AXIS: -22 DEGREES
QRSD INTERVAL: 82 MS
QRSD INTERVAL: 82 MS
QRSD INTERVAL: 84 MS
QT INTERVAL: 328 MS
QT INTERVAL: 336 MS
QT INTERVAL: 352 MS
QTC INTERVAL: 402 MS
QTC INTERVAL: 405 MS
QTC INTERVAL: 413 MS
RBC # BLD AUTO: 3.47 MILLION/UL (ref 3.81–5.12)
SODIUM SERPL-SCNC: 135 MMOL/L (ref 136–145)
SODIUM SERPL-SCNC: 135 MMOL/L (ref 136–145)
T WAVE AXIS: 12 DEGREES
T WAVE AXIS: 30 DEGREES
T WAVE AXIS: 9 DEGREES
VENTRICULAR RATE: 83 BPM
VENTRICULAR RATE: 86 BPM
VENTRICULAR RATE: 92 BPM
WBC # BLD AUTO: 12.09 THOUSAND/UL (ref 4.31–10.16)

## 2021-08-27 PROCEDURE — 99232 SBSQ HOSP IP/OBS MODERATE 35: CPT | Performed by: INTERNAL MEDICINE

## 2021-08-27 PROCEDURE — 80048 BASIC METABOLIC PNL TOTAL CA: CPT | Performed by: SURGERY

## 2021-08-27 PROCEDURE — 84100 ASSAY OF PHOSPHORUS: CPT | Performed by: STUDENT IN AN ORGANIZED HEALTH CARE EDUCATION/TRAINING PROGRAM

## 2021-08-27 PROCEDURE — C1751 CATH, INF, PER/CENT/MIDLINE: HCPCS

## 2021-08-27 PROCEDURE — 93010 ELECTROCARDIOGRAM REPORT: CPT | Performed by: INTERNAL MEDICINE

## 2021-08-27 PROCEDURE — 82330 ASSAY OF CALCIUM: CPT | Performed by: STUDENT IN AN ORGANIZED HEALTH CARE EDUCATION/TRAINING PROGRAM

## 2021-08-27 PROCEDURE — 85730 THROMBOPLASTIN TIME PARTIAL: CPT | Performed by: SURGERY

## 2021-08-27 PROCEDURE — 99222 1ST HOSP IP/OBS MODERATE 55: CPT | Performed by: SURGERY

## 2021-08-27 PROCEDURE — 83735 ASSAY OF MAGNESIUM: CPT | Performed by: STUDENT IN AN ORGANIZED HEALTH CARE EDUCATION/TRAINING PROGRAM

## 2021-08-27 PROCEDURE — 80053 COMPREHEN METABOLIC PANEL: CPT | Performed by: STUDENT IN AN ORGANIZED HEALTH CARE EDUCATION/TRAINING PROGRAM

## 2021-08-27 PROCEDURE — 36569 INSJ PICC 5 YR+ W/O IMAGING: CPT

## 2021-08-27 PROCEDURE — 85025 COMPLETE CBC W/AUTO DIFF WBC: CPT | Performed by: STUDENT IN AN ORGANIZED HEALTH CARE EDUCATION/TRAINING PROGRAM

## 2021-08-27 RX ORDER — SODIUM CHLORIDE 9 MG/ML
50 INJECTION, SOLUTION INTRAVENOUS CONTINUOUS
Status: DISCONTINUED | OUTPATIENT
Start: 2021-08-27 | End: 2021-08-30

## 2021-08-27 RX ORDER — SODIUM CHLORIDE, SODIUM GLUCONATE, SODIUM ACETATE, POTASSIUM CHLORIDE, MAGNESIUM CHLORIDE, SODIUM PHOSPHATE, DIBASIC, AND POTASSIUM PHOSPHATE .53; .5; .37; .037; .03; .012; .00082 G/100ML; G/100ML; G/100ML; G/100ML; G/100ML; G/100ML; G/100ML
50 INJECTION, SOLUTION INTRAVENOUS ONCE
Status: COMPLETED | OUTPATIENT
Start: 2021-08-27 | End: 2021-08-27

## 2021-08-27 RX ADMIN — SUCRALFATE 1 G: 1 TABLET ORAL at 17:24

## 2021-08-27 RX ADMIN — SODIUM CHLORIDE 50 ML/HR: 0.9 INJECTION, SOLUTION INTRAVENOUS at 15:59

## 2021-08-27 RX ADMIN — PANTOPRAZOLE SODIUM 40 MG: 40 TABLET, DELAYED RELEASE ORAL at 05:02

## 2021-08-27 RX ADMIN — GABAPENTIN 100 MG: 100 CAPSULE ORAL at 08:40

## 2021-08-27 RX ADMIN — SODIUM CHLORIDE, SODIUM GLUCONATE, SODIUM ACETATE, POTASSIUM CHLORIDE, MAGNESIUM CHLORIDE, SODIUM PHOSPHATE, DIBASIC, AND POTASSIUM PHOSPHATE 50 ML/HR: .53; .5; .37; .037; .03; .012; .00082 INJECTION, SOLUTION INTRAVENOUS at 15:09

## 2021-08-27 RX ADMIN — SUCRALFATE 1 G: 1 TABLET ORAL at 10:39

## 2021-08-27 RX ADMIN — SUCRALFATE 1 G: 1 TABLET ORAL at 08:40

## 2021-08-27 RX ADMIN — GABAPENTIN 100 MG: 100 CAPSULE ORAL at 17:24

## 2021-08-27 RX ADMIN — POLYETHYLENE GLYCOL 3350 17 G: 17 POWDER, FOR SOLUTION ORAL at 08:41

## 2021-08-27 RX ADMIN — DOCUSATE SODIUM 100 MG: 100 CAPSULE, LIQUID FILLED ORAL at 08:40

## 2021-08-27 RX ADMIN — SUCRALFATE 1 G: 1 TABLET ORAL at 21:55

## 2021-08-27 NOTE — ASSESSMENT & PLAN NOTE
· No reported trauma  · Neurologically intact  · Neurosurgery consulted  · TLSO brace ordered  · Upright XR pending   · Multimodal pain regimen  · PT/OT

## 2021-08-27 NOTE — PROGRESS NOTES
Parker Hernandez visited Pt      08/27/21 1500   Clinical Encounter Type   Visited With Patient   Voodoo Encounters   Voodoo Needs Prayer   Sacramental Encounters   Communion Given Indicator Yes   Sacrament of Sick-Anointing Anointed

## 2021-08-27 NOTE — ASSESSMENT & PLAN NOTE
· Corrected calcium was 13 1 on presentation  · Nephrology following  · IVF initiated  · EKG troponin negative

## 2021-08-27 NOTE — PROCEDURES
Insert PICC line    Date/Time: 8/27/2021 2:38 PM  Performed by: Marysol Hairston RN  Authorized by: Pura Palacios MD     Patient location:  Bedside  Other Assisting Provider: Yes (comment) [facility]    Consent:     Consent obtained:  Written (Physician obtained)    Consent given by:  Spouse    Procedural risks discussed: with MD Cuevas protocol:     Procedure explained and questions answered to patient or proxy's satisfaction: yes      Relevant documents present and verified: yes      Test results available and properly labeled: yes      Radiology Images displayed and confirmed  If images not available, report reviewed: yes      Required blood products, implants, devices, and special equipment available: yes      Site/side marked: yes      Immediately prior to procedure, a time out was called: yes      Patient identity confirmed:  Arm band and hospital-assigned identification number  Pre-procedure details:     Hand hygiene: Hand hygiene performed prior to insertion      Sterile barrier technique: All elements of maximal sterile technique followed      Skin preparation:  ChloraPrep    Skin preparation agent: Skin preparation agent completely dried prior to procedure    Indications:     PICC line indications: no peripheral vascular access    Anesthesia (see MAR for exact dosages):      Anesthesia method:  Local infiltration    Local anesthetic:  Lidocaine 2% w/o epi (2 ml)  Procedure details:     Location:  Basilic    Vessel type: vein      Laterality:  Right    Approach: percutaneous technique used      Patient position:  Flat    Procedural supplies:  Double lumen    Catheter size:  5 Fr    Landmarks identified: yes      Ultrasound guidance: yes      Ultrasound image availability:  Not saved    Sterile ultrasound techniques: Sterile gel and sterile probe covers were used      Number of attempts:  1    Successful placement: yes      Vessel of catheter tip end:  Sherlock 3CG confirmed    Total catheter length (cm):  34    Catheter out on skin (cm):  1    Max flow rate:  999    Arm circumference:  25  Post-procedure details:     Post-procedure:  Dressing applied and securement device placed    Assessment:  Blood return through all ports and free fluid flow    Post-procedure complications: none      Patient tolerance of procedure:   Tolerated well, no immediate complications  Comments:      OK per Nephrology for PICC line

## 2021-08-27 NOTE — NURSING NOTE
Called 8th floor to follow up with this inpatient who had an extravasation of contrast that was given for CT on     8/26/21  Spoke with RN Grabiel Myres  and was informed that site of IV of left ac other than a bruise has no issues  Patient is doing fine per nurse

## 2021-08-27 NOTE — ASSESSMENT & PLAN NOTE
· Cr improving since initial STEPHANIE  · Hold IVF at this time  · Transition to PO diet  · Daily BMPs  · Hold nephrotoxic agents such as Lipitor  · Nephrology following

## 2021-08-27 NOTE — CASE MANAGEMENT
CM spoke to pt's son and dtr in law pertaining to d/c plan  Pt was fully functional roughly 5-6 weeks ago, and for the last 3-4 weeks the pt has been bed bound  There are therapy orders for the pt but CM was attempting to ascertain if the pt wanted to bring the pt home with services, as she was bedbound, or if they possibly wanted her to d/c to a rehab center   Family wishes to discuss amongst themselves

## 2021-08-27 NOTE — WOUND OSTOMY CARE
Progress Note - Wound   Harmony Hull 80 y o  female MRN: [id number]  Unit/Bed#: [facility] 810-01 Encounter: [id number]      Assessment:  Wound care consulted for assessment of sacral pressure injury noted on admission  Admitted s/p fall  Patient resides at a nursing home  History of -diverticulitis, CKD, GERD, HTN, shingles and chronic pain  Patient seen in bed, alert and awake oriented to self, cooperative for the assessment  Patient is dependent for care, incontinent of bowel and bladder, and max assist of one with turning for the assessment  Foam wedges in use for repositioning  Will recommend nutrition consult  B/l heels are intact with blanchable hyperpigmented skin  Vascular is following along with the patient  1  Mid sacrum and R buttock - non-evolving DTI - POA  All aspects measured together  This wound has the potential to evolve to a full thickness injury: stage 3, stage 4 or unstageable pressure injury  Wound presents as intact non-blanchable purple/deep maroon colored skin  No open aspects  Elham-wound is intact with blanchable pink colored skin  -will recommend hydraguard cream as patient is incontinent of bowel and bladder  No induration, fluctuance, odor, warmth, redness, or purulence noted to the above noted wounds  Patient tolerated well- no s/s of non-verbal pain or discomfort observed during the encounter  Primary nurse aware of plan of care  See flow sheets for more detailed assessment findings  Will follow along  Plan:   1  Apply hydraguard to b/l buttocks and sacrum TID and PRN for prevention and protection  2  Apply skin nourishing cream the entire skin daily for moisture  3  Turn and reposition patient every  2 hours   4  Elevate heels off of bed with pillows to offload pressure   5  Apply EHOB waffle cushion to chair when OOB, if able  6  Nutrition consult   7  Apply Allevyn foam to heels, shubham w/P, peel foam check skin integrity q-shift   Change q3d and PRN for soilage/dislodgement  8  Wound care will follow along with patient weekly, please call or tiger text with questions and concerns    Objective:    Vitals: Blood pressure 142/85, pulse 90, temperature 97 5 °F (36 4 °C), temperature source Oral, resp  rate 16, height 4' 10" (1 473 m), weight 49 9 kg (110 lb), SpO2 98 %  ,Body mass index is 22 99 kg/m²  Wound 08/26/21 Pressure Injury Sacrum (Active)   Wound Image   08/27/21 0947   Wound Description Dry; Intact; Light purple 08/27/21 0947   Pressure Injury Stage DTPI 08/27/21 0947   Elham-wound Assessment Dry; Intact; Pink 08/27/21 0947   Wound Length (cm) 2 cm 08/27/21 0947   Wound Width (cm) 5 5 cm 08/27/21 0947   Wound Depth (cm) 0 cm 08/27/21 0947   Wound Surface Area (cm^2) 11 cm^2 08/27/21 0947   Wound Volume (cm^3) 0 cm^3 08/27/21 0947   Calculated Wound Volume (cm^3) 0 cm^3 08/27/21 0947   Tunneling 0 cm 08/27/21 0947   Tunneling in depth located at 0 08/27/21 0947   Undermining 0 08/27/21 0947   Undermining is depth extending from 0 08/27/21 0947   Drainage Amount None 08/27/21 0947   Non-staged Wound Description Not applicable 12/27/60 2252   Treatments Cleansed;Site care;Elevated 08/27/21 0947   Dressing Moisture barrier 08/27/21 0947   Wound packed? No 08/27/21 0947   Packing- # removed 0 08/27/21 0947   Packing- # inserted 0 08/27/21 0947   Patient Tolerance Tolerated well 08/27/21 0947     Recommendations written as orders  AVS updated    Teresa Abdi RN BSN CWON

## 2021-08-27 NOTE — ASSESSMENT & PLAN NOTE
· Hold home Cozaar due to STEPHANIE    · Nephrology following, determine ability to resume  · BP appears well controlled

## 2021-08-27 NOTE — ASSESSMENT & PLAN NOTE
· Family reports patient syncopized thing while in a chair    · Echo demonstrated EF of 60%  · EKG/troponin--negative  · IVF  · Telemetry

## 2021-08-27 NOTE — CASE MANAGEMENT
MAYELA spoke to pt's  and son  Family explains that a home d/c at this time doesn't seem feasible and prefer a d/c to rehab and possible long term care  Pt and family would like to have referrals placed to the SNF in [place]  CM placed referrals and will follow up    Pt will need therapy evaluations completed

## 2021-08-27 NOTE — DISCHARGE INSTR - OTHER ORDERS
1  Apply hydraguard to b/l buttocks and sacrum TID and PRN for prevention and protection  2  Apply skin nourishing cream the entire skin daily for moisture  3  Turn and reposition patient every  2 hours   4  Elevate heels off of bed with pillows to offload pressure   5  Apply EHOB waffle cushion to chair when OOB, if able  6  Apply Allevyn foam to heels, shubham w/P, peel foam check skin integrity q-shift   Change q3d and PRN for soilage/dislodgement

## 2021-08-27 NOTE — PROGRESS NOTES
[address] [facility]  Progress Note - Marixa Arguello 10/15/1931, 80 y o  female MRN: [id number]  Unit/Bed#: [facility] 810-01 Encounter: [id number]  Primary Care Provider: Alissa Aaron MD   Date and time admitted to hospital: 8/26/2021  2:10 AM    Syncope  Assessment & Plan  · Family reports patient syncopized thing while in a chair  · Echo demonstrated EF of 60%  · EKG/troponin--negative  · IVF  · Telemetry    STEPHANIE (acute kidney injury) ([facility] )  Assessment & Plan  · Cr improving since initial STEPHANIE  · Hold IVF at this time  · Transition to PO diet  · Daily BMPs  · Hold nephrotoxic agents such as Lipitor  · Nephrology following    Hypercalcemia  Assessment & Plan  · Corrected calcium was 13 1 on presentation  · Nephrology following  · IVF initiated  · EKG troponin negative    Lower leg DVT (deep venous thromboembolism), acute, left ([facility] [address] )  Assessment & Plan  · Family reports patient being largely immobile for the past 3 weeks  · Patient has decreased pulses in left lower leg  Patient is noted to have swelling in LLE  · Duplex completed and DVT was reported, now with evidence of PE  · Started on heparin gtt  · Appreciate vascular surgery recommendations  · Will consider transition to Eliquis when stable    Closed compression fractures of L3, L5 lumbar vertebra, initial encounter ([facility] )  Assessment & Plan  · No reported trauma  · Neurologically intact  · Neurosurgery consulted  · TLSO brace ordered  · Upright XR pending  · Multimodal pain regimen  · PT/OT    Closed wedge compression fractures of T7, T8 vertebra ([facility])  Assessment & Plan  · No reported trauma  · Neurologically intact  · Neurosurgery consulted  · TLSO brace ordered  · Upright XR pending   · Multimodal pain regimen  · PT/OT    Hyperlipidemia  Assessment & Plan  · Hold home Lipitor    Essential hypertension with goal blood pressure less than 140/90  Assessment & Plan  · Hold home Cozaar due to STEPHANIE    · Nephrology following, determine ability to resume  · BP appears well controlled          Disposition: Med/surg      SUBJECTIVE:  Chief Complaint: "I'm ok"    Subjective: Patient underwent CTA of the chest yesterday due to concern for VTE  Demosntrated bilateral segmental and lobar PE for which patient is already being anticoagulated  Did note some back pain which she states that she thinks is because of how she has been laying  Denies nausea, vomiting, fever, chills, chest pain, shortness of breath, dizziness or lightheadedness          OBJECTIVE:       Meds/Allergies     Current Facility-Administered Medications:     ALPRAZolam (XANAX) tablet 0 25 mg, 0 25 mg, Oral, BID PRN, Lacinda Hull, DO    cyclobenzaprine (FLEXERIL) tablet 10 mg, 10 mg, Oral, TID PRN, Marilou Hatchet Hakim, DO, 10 mg at 08/26/21 0000    dicyclomine (BENTYL) capsule 20 mg, 20 mg, Oral, 4x Daily PRN, Marilou Hatchet Hakim, DO    docusate sodium (COLACE) capsule 100 mg, 100 mg, Oral, BID, Juanita Hakim, DO, 100 mg at 08/27/21 0840    gabapentin (NEURONTIN) capsule 100 mg, 100 mg, Oral, BID, Juanita Hakim, DO, 100 mg at 08/27/21 0840    heparin (porcine) 25,000 units in 0 45% NaCl 250 mL infusion (premix), 3-30 Units/kg/hr (Order-Specific), Intravenous, Titrated, EUNICE Rivera, Last Rate: 4 5 mL/hr at 08/27/21 1145, 9 Units/kg/hr at 08/27/21 1145    heparin (porcine) injection 2,000 Units, 2,000 Units, Intravenous, Q1H PRN, EUNICE Rivera    heparin (porcine) injection 4,000 Units, 4,000 Units, Intravenous, Q1H PRN, EUNICE Rivera    pantoprazole (PROTONIX) EC tablet 40 mg, 40 mg, Oral, Early Morning, Juanita Hakim, DO, 40 mg at 08/27/21 0502    polyethylene glycol (MIRALAX) packet 17 g, 17 g, Oral, Daily, Juanita Hakim, DO, 17 g at 08/27/21 0841    sucralfate (CARAFATE) tablet 1 g, 1 g, Oral, 4x Daily (AC & HS), Juanita Nina DO, 1 g at 08/27/21 1039     Vitals:   Vitals:    08/27/21 0742   BP: 142/85   Pulse: 90   Resp: 16   Temp: 97 5 °F (36 4 °C)   SpO2: 98% Intake/Output:  I/O       08/25 0701 - 08/26 0700 08/26 0701 - 08/27 0700 08/27 0701 - 08/28 0700    P  O    120    I V  (mL/kg)  1000 (20)     IV Piggyback  100     Total Intake(mL/kg)  1100 (22) 120 (2 4)    Net  +1100 +120           Unmeasured Urine Occurrence   1 x    Unmeasured Stool Occurrence  1 x 1 x           Nutrition/GI Proph/Bowel Reg: Regular diet    Physical Exam:   GENERAL APPEARANCE: NAD, well appearing  NEURO: GCS 15  confused  HEENT: Normocephalic, atraumatic  Neck supple  PERRL  CV: RRR, no m/g/r  Pulses 2+ radial bilaterally  LUNGS: CTAB, no wheezing, rhonchi or rales  GI: Soft, nontender, nondistended  : Normal appearance  MSK: Moving all extremities equeally  No deformity or tenderness  Mild T and L spine tenderness  SKIN: warm, dry and intact    Invasive Devices     Peripherally Inserted Central Catheter Line            PICC Line 52/00/33 Right Basilic <1 day          Peripheral Intravenous Line            Peripheral IV 08/26/21 Right;Upper Arm 1 day    Peripheral IV 08/27/21 Right Antecubital <1 day                 Lab Results: Results: I have personally reviewed pertinent reports  Imaging/EKG Studies: Results: I have personally reviewed pertinent reports      Other Studies:   VTE Prophylaxis: Heparin

## 2021-08-27 NOTE — ASSESSMENT & PLAN NOTE
· Family reports patient being largely immobile for the past 3 weeks  · Patient has decreased pulses in left lower leg    Patient is noted to have swelling in LLE  · Duplex completed and DVT was reported, now with evidence of PE  · Started on heparin gtt  · Appreciate vascular surgery recommendations  · Will consider transition to Eliquis when stable

## 2021-08-27 NOTE — PLAN OF CARE
Problem: Potential for Falls  Goal: Patient will remain free of falls  Description: INTERVENTIONS:  - Educate patient/family on patient safety including physical limitations  - Instruct patient to call for assistance with activity   - Consult OT/PT to assist with strengthening/mobility   - Keep Call bell within reach  - Keep bed low and locked with side rails adjusted as appropriate  - Keep care items and personal belongings within reach  - Initiate and maintain comfort rounds  - Make Fall Risk Sign visible to staff  - Apply yellow socks and bracelet for high fall risk patients  - Consider moving patient to room near nurses station  Outcome: Progressing     Problem: NEUROSENSORY - ADULT  Goal: Achieves stable or improved neurological status  Description: INTERVENTIONS  - Monitor and report changes in neurological status  - Monitor vital signs such as temperature, blood pressure, glucose, and any other labs ordered   - Initiate measures to prevent increased intracranial pressure  - Monitor for seizure activity and implement precautions if appropriate      Outcome: Progressing     Problem: METABOLIC, FLUID AND ELECTROLYTES - ADULT  Goal: Electrolytes maintained within normal limits  Description: INTERVENTIONS:  - Monitor labs and assess patient for signs and symptoms of electrolyte imbalances  - Administer electrolyte replacement as ordered  - Monitor response to electrolyte replacements, including repeat lab results as appropriate  - Instruct patient on fluid and nutrition as appropriate  Outcome: Progressing     Problem: SKIN/TISSUE INTEGRITY - ADULT  Goal: Skin Integrity remains intact(Skin Breakdown Prevention)  Description: Assess:  -Inspect skin when repositioning, toileting, and assisting with ADLS  -Assess extremities for adequate circulation and sensation     Bed Management:  -Have minimal linens on bed & keep smooth, unwrinkled  -Change linens as needed when moist or perspiring    Toileting:  -Offer bedside commode    Activity:  -Encourage activity and walks on unit  -Encourage or provide ROM exercises   -Use appropriate equipment to lift or move patient in bed      Skin Care:  -Avoid use of baby powder, tape, friction and shearing, hot water or constrictive clothing  -Do not massage red bony areas    Outcome: Progressing  Goal: Pressure injury heals and does not worsen  Description: Interventions:  - Implement low air loss mattress or specialty surface (Criteria met)  - Apply silicone foam dressing    - Apply fecal or urinary incontinence containment device     - Consider nutrition services referral as needed  Outcome: Progressing     Problem: MOBILITY - ADULT  Goal: Maintain or return to baseline ADL function  Description: INTERVENTIONS:  -  Assess patient's ability to carry out ADLs; assess patient's baseline for ADL function and identify physical deficits which impact ability to perform ADLs (bathing, care of mouth/teeth, toileting, grooming, dressing, etc )  - Assess/evaluate cause of self-care deficits   - Assess range of motion  - Assess patient's mobility; develop plan if impaired  - Assess patient's need for assistive devices and provide as appropriate  - Encourage maximum independence but intervene and supervise when necessary  - Involve family in performance of ADLs  - Assess for home care needs following discharge   - Consider OT consult to assist with ADL evaluation and planning for discharge  - Provide patient education as appropriate  Outcome: Progressing  Goal: Maintains/Returns to pre admission functional level  Description: INTERVENTIONS:  - Perform BMAT or MOVE assessment daily    - Set and communicate daily mobility goal to care team and patient/family/caregiver     - Collaborate with rehabilitation services on mobility goals if consulted  - Out of bed for toileting  - Record patient progress and toleration of activity level   Outcome: Progressing

## 2021-08-27 NOTE — PROGRESS NOTES
NEPHROLOGY PROGRESS NOTE   Gardiner Babinski 80 y o  female MRN: [id number]  Unit/Bed#: [facility] 810-01 Encounter: [id number]  Reason for Consult:  Acute kidney injury    ASSESSMENT/PLAN:  1  Acute kidney injury most likely secondary to prerenal azotemia given volume depletion as well as hypercalcemia overall has significantly improved  2  Hypercalcemia, noted inappropriately suppressed PTH, may represent component of hyperparathyroidism  Pending free light chain assay, SPEP/UPEP  Pending PTH related peptide  3  T7/T8/L3/L5 compression fractures  4  PE/DVT currently on heparin infusion      PLAN:  · Overall renal function has normalized  · Persistent hypercalcemia although improved would continue with gentle IV fluids  · Awaiting SPEP/UPEP/free light chain assay as well as PTH related peptide    SUBJECTIVE:  Seen and examined  Patient lying flat  No respiratory distress  Denies any chest pain or pressure  No abdominal pain reported  Review of Systems    OBJECTIVE:  Current Weight: Weight - Scale: 49 9 kg (110 lb)  Vitals:    08/26/21 2300 08/27/21 0300 08/27/21 0742 08/27/21 1535   BP: 151/88 153/90 142/85 132/82   BP Location:   Right arm Left arm   Pulse: 102  90 95   Resp: 18 18 16 18   Temp: 97 9 °F (36 6 °C) (!) 97 2 °F (36 2 °C) 97 5 °F (36 4 °C) 97 5 °F (36 4 °C)   TempSrc:   Oral Oral   SpO2:   98% 97%   Weight:       Height:           Intake/Output Summary (Last 24 hours) at 8/27/2021 1544  Last data filed at 8/27/2021 1300  Gross per 24 hour   Intake 220 ml   Output --   Net 220 ml       Physical Exam  Constitutional:       Appearance: She is not ill-appearing  HENT:      Head: Normocephalic and atraumatic  Eyes:      General: No scleral icterus  Cardiovascular:      Rate and Rhythm: Normal rate and regular rhythm  Pulmonary:      Effort: Pulmonary effort is normal       Breath sounds: Normal breath sounds  Abdominal:      General: There is no distension  Palpations: Abdomen is soft  Musculoskeletal:      Right lower leg: No edema  Left lower leg: No edema  Skin:     General: Skin is warm and dry  Findings: No rash  Neurological:      Mental Status: She is alert and oriented to person, place, and time           Medications:    Current Facility-Administered Medications:     ALPRAZolam (XANAX) tablet 0 25 mg, 0 25 mg, Oral, BID PRN, Jannifer Danilo Hakim, DO    cyclobenzaprine (FLEXERIL) tablet 10 mg, 10 mg, Oral, TID PRN, Jannifer Danilo Hakim, DO, 10 mg at 08/26/21 6607    dicyclomine (BENTYL) capsule 20 mg, 20 mg, Oral, 4x Daily PRN, Jannifer Danilo Hakim, DO    docusate sodium (COLACE) capsule 100 mg, 100 mg, Oral, BID, Juanita Hakim, DO, 100 mg at 08/27/21 0840    gabapentin (NEURONTIN) capsule 100 mg, 100 mg, Oral, BID, Juanita Hakim, DO, 100 mg at 08/27/21 0840    heparin (porcine) 25,000 units in 0 45% NaCl 250 mL infusion (premix), 3-30 Units/kg/hr (Order-Specific), Intravenous, Titrated, Jamie Clamp, CRNP, Last Rate: 4 5 mL/hr at 08/27/21 1145, 9 Units/kg/hr at 08/27/21 1145    heparin (porcine) injection 2,000 Units, 2,000 Units, Intravenous, Q1H PRN, Jamie Clamp, CRNP    heparin (porcine) injection 4,000 Units, 4,000 Units, Intravenous, Q1H PRN, Jamie Clamp, CRNP    pantoprazole (PROTONIX) EC tablet 40 mg, 40 mg, Oral, Early Morning, Juanita Hakim, DO, 40 mg at 08/27/21 0502    polyethylene glycol (MIRALAX) packet 17 g, 17 g, Oral, Daily, Juanita Hakim, DO, 17 g at 08/27/21 0841    sucralfate (CARAFATE) tablet 1 g, 1 g, Oral, 4x Daily (AC & HS), Juanita Hakim, DO, 1 g at 08/27/21 1039    Laboratory Results:  Results from last 7 days   Lab Units 08/27/21  1515 08/27/21  0255 08/26/21  1206 08/26/21  0438 08/25/21  1954   WBC Thousand/uL 12 09*  --  13 56* 14 59* 17 59*   HEMOGLOBIN g/dL 11 3*  --  13 0 13 5 13 6   HEMATOCRIT % 34 2*  --  40 2 41 3 42 0   PLATELETS Thousands/uL 187  --  172 197 202   POTASSIUM mmol/L  --  3 6  --  3 7 4 3   CHLORIDE mmol/L  --  105  --  105 102   CO2 mmol/L  -- 26  --  25 26   BUN mg/dL  --  30*  --  35* 33*   CREATININE mg/dL  --  0 87  --  1 53* 2 24*   CALCIUM mg/dL  --  10 6* 10 9* 11 3* 11 9*

## 2021-08-28 ENCOUNTER — APPOINTMENT (INPATIENT)
Dept: RADIOLOGY | Facility: HOSPITAL | Age: 86
DRG: 840 | End: 2021-08-28
Payer: MEDICARE

## 2021-08-28 LAB
ALBUMIN SERPL BCP-MCNC: 2.4 G/DL (ref 3.5–5)
ALBUMIN SERPL ELPH-MCNC: 2.95 G/DL (ref 3.5–5)
ALBUMIN SERPL ELPH-MCNC: 40.4 % (ref 52–65)
ALP SERPL-CCNC: 181 U/L (ref 46–116)
ALPHA1 GLOB SERPL ELPH-MCNC: 0.53 G/DL (ref 0.1–0.4)
ALPHA1 GLOB SERPL ELPH-MCNC: 7.2 % (ref 2.5–5)
ALPHA2 GLOB SERPL ELPH-MCNC: 0.94 G/DL (ref 0.4–1.2)
ALPHA2 GLOB SERPL ELPH-MCNC: 12.9 % (ref 7–13)
ALT SERPL W P-5'-P-CCNC: 12 U/L (ref 12–78)
ANION GAP SERPL CALCULATED.3IONS-SCNC: 8 MMOL/L (ref 4–13)
APTT PPP: 71 SECONDS (ref 23–37)
AST SERPL W P-5'-P-CCNC: 19 U/L (ref 5–45)
BASOPHILS # BLD AUTO: 0.06 THOUSANDS/ΜL (ref 0–0.1)
BASOPHILS NFR BLD AUTO: 1 % (ref 0–1)
BETA GLOB ABNORMAL SERPL ELPH-MCNC: 0.39 G/DL (ref 0.4–0.8)
BETA1 GLOB SERPL ELPH-MCNC: 5.4 % (ref 5–13)
BETA2 GLOB SERPL ELPH-MCNC: 8.9 % (ref 2–8)
BETA2+GAMMA GLOB SERPL ELPH-MCNC: 0.65 G/DL (ref 0.2–0.5)
BILIRUB SERPL-MCNC: 0.75 MG/DL (ref 0.2–1)
BUN SERPL-MCNC: 24 MG/DL (ref 5–25)
CA-I BLD-SCNC: 1.37 MMOL/L (ref 1.12–1.32)
CALCIT SERPL-MCNC: <2 PG/ML (ref 0–5)
CALCIUM ALBUM COR SERPL-MCNC: 11.6 MG/DL (ref 8.3–10.1)
CALCIUM SERPL-MCNC: 10.3 MG/DL (ref 8.3–10.1)
CHLORIDE SERPL-SCNC: 107 MMOL/L (ref 100–108)
CO2 SERPL-SCNC: 21 MMOL/L (ref 21–32)
CREAT SERPL-MCNC: 0.56 MG/DL (ref 0.6–1.3)
EOSINOPHIL # BLD AUTO: 0.1 THOUSAND/ΜL (ref 0–0.61)
EOSINOPHIL NFR BLD AUTO: 1 % (ref 0–6)
ERYTHROCYTE [DISTWIDTH] IN BLOOD BY AUTOMATED COUNT: 14.2 % (ref 11.6–15.1)
GAMMA GLOB ABNORMAL SERPL ELPH-MCNC: 1.84 G/DL (ref 0.5–1.6)
GAMMA GLOB SERPL ELPH-MCNC: 25.2 % (ref 12–22)
GFR SERPL CREATININE-BSD FRML MDRD: 83 ML/MIN/1.73SQ M
GLUCOSE SERPL-MCNC: 71 MG/DL (ref 65–140)
HCT VFR BLD AUTO: 33.9 % (ref 34.8–46.1)
HGB BLD-MCNC: 11.2 G/DL (ref 11.5–15.4)
IGA SERPL-MCNC: 185 MG/DL (ref 70–400)
IGG SERPL-MCNC: 1630 MG/DL (ref 700–1600)
IGG/ALB SER: 0.68 {RATIO} (ref 1.1–1.8)
IGM SERPL-MCNC: 46 MG/DL (ref 40–230)
IMM GRANULOCYTES # BLD AUTO: 0.12 THOUSAND/UL (ref 0–0.2)
IMM GRANULOCYTES NFR BLD AUTO: 1 % (ref 0–2)
INTERPRETATION UR IFE-IMP: NORMAL
LDH SERPL-CCNC: 197 U/L (ref 81–234)
LYMPHOCYTES # BLD AUTO: 1.35 THOUSANDS/ΜL (ref 0.6–4.47)
LYMPHOCYTES NFR BLD AUTO: 13 % (ref 14–44)
M PROTEIN 1 MFR SERPL ELPH: 19.7 %
M PROTEIN 1 SERPL ELPH-MCNC: 1.44 G/DL
MCH RBC QN AUTO: 32.2 PG (ref 26.8–34.3)
MCHC RBC AUTO-ENTMCNC: 33 G/DL (ref 31.4–37.4)
MCV RBC AUTO: 97 FL (ref 82–98)
MONOCYTES # BLD AUTO: 1.23 THOUSAND/ΜL (ref 0.17–1.22)
MONOCYTES NFR BLD AUTO: 12 % (ref 4–12)
NEUTROPHILS # BLD AUTO: 7.66 THOUSANDS/ΜL (ref 1.85–7.62)
NEUTS SEG NFR BLD AUTO: 72 % (ref 43–75)
NRBC BLD AUTO-RTO: 0 /100 WBCS
PHOSPHATE SERPL-MCNC: 1.3 MG/DL (ref 2.3–4.1)
PLATELET # BLD AUTO: 198 THOUSANDS/UL (ref 149–390)
PMV BLD AUTO: 11.3 FL (ref 8.9–12.7)
POTASSIUM SERPL-SCNC: 3 MMOL/L (ref 3.5–5.3)
PROT PATTERN SERPL ELPH-IMP: ABNORMAL
PROT SERPL-MCNC: 7.1 G/DL (ref 6.4–8.2)
PROT SERPL-MCNC: 7.3 G/DL (ref 6.4–8.2)
RBC # BLD AUTO: 3.48 MILLION/UL (ref 3.81–5.12)
SODIUM SERPL-SCNC: 136 MMOL/L (ref 136–145)
WBC # BLD AUTO: 10.52 THOUSAND/UL (ref 4.31–10.16)

## 2021-08-28 PROCEDURE — 97167 OT EVAL HIGH COMPLEX 60 MIN: CPT

## 2021-08-28 PROCEDURE — 97163 PT EVAL HIGH COMPLEX 45 MIN: CPT

## 2021-08-28 PROCEDURE — 85025 COMPLETE CBC W/AUTO DIFF WBC: CPT | Performed by: EMERGENCY MEDICINE

## 2021-08-28 PROCEDURE — 99233 SBSQ HOSP IP/OBS HIGH 50: CPT | Performed by: SURGERY

## 2021-08-28 PROCEDURE — 82330 ASSAY OF CALCIUM: CPT | Performed by: EMERGENCY MEDICINE

## 2021-08-28 PROCEDURE — 83615 LACTATE (LD) (LDH) ENZYME: CPT | Performed by: PHYSICIAN ASSISTANT

## 2021-08-28 PROCEDURE — 72100 X-RAY EXAM L-S SPINE 2/3 VWS: CPT

## 2021-08-28 PROCEDURE — 84100 ASSAY OF PHOSPHORUS: CPT | Performed by: INTERNAL MEDICINE

## 2021-08-28 PROCEDURE — 99223 1ST HOSP IP/OBS HIGH 75: CPT | Performed by: INTERNAL MEDICINE

## 2021-08-28 PROCEDURE — 80053 COMPREHEN METABOLIC PANEL: CPT | Performed by: EMERGENCY MEDICINE

## 2021-08-28 PROCEDURE — 99232 SBSQ HOSP IP/OBS MODERATE 35: CPT | Performed by: INTERNAL MEDICINE

## 2021-08-28 PROCEDURE — 72072 X-RAY EXAM THORAC SPINE 3VWS: CPT

## 2021-08-28 PROCEDURE — 85730 THROMBOPLASTIN TIME PARTIAL: CPT | Performed by: SURGERY

## 2021-08-28 PROCEDURE — 82232 ASSAY OF BETA-2 PROTEIN: CPT | Performed by: PHYSICIAN ASSISTANT

## 2021-08-28 PROCEDURE — 82784 ASSAY IGA/IGD/IGG/IGM EACH: CPT | Performed by: PHYSICIAN ASSISTANT

## 2021-08-28 RX ORDER — POTASSIUM CHLORIDE 20 MEQ/1
40 TABLET, EXTENDED RELEASE ORAL ONCE
Status: COMPLETED | OUTPATIENT
Start: 2021-08-28 | End: 2021-08-28

## 2021-08-28 RX ORDER — ONDANSETRON 2 MG/ML
4 INJECTION INTRAMUSCULAR; INTRAVENOUS EVERY 6 HOURS PRN
Status: DISCONTINUED | OUTPATIENT
Start: 2021-08-28 | End: 2021-09-04 | Stop reason: HOSPADM

## 2021-08-28 RX ORDER — ACETAMINOPHEN 325 MG/1
650 TABLET ORAL EVERY 6 HOURS PRN
Status: DISCONTINUED | OUTPATIENT
Start: 2021-08-28 | End: 2021-08-29

## 2021-08-28 RX ORDER — OXYCODONE HYDROCHLORIDE 5 MG/1
2.5 TABLET ORAL EVERY 4 HOURS PRN
Status: DISCONTINUED | OUTPATIENT
Start: 2021-08-28 | End: 2021-09-04 | Stop reason: HOSPADM

## 2021-08-28 RX ORDER — POTASSIUM CHLORIDE 20 MEQ/1
40 TABLET, EXTENDED RELEASE ORAL 2 TIMES DAILY
Status: DISCONTINUED | OUTPATIENT
Start: 2021-08-28 | End: 2021-08-28

## 2021-08-28 RX ADMIN — POTASSIUM PHOSPHATE, MONOBASIC AND POTASSIUM PHOSPHATE, DIBASIC 30 MMOL: 224; 236 INJECTION, SOLUTION, CONCENTRATE INTRAVENOUS at 18:14

## 2021-08-28 RX ADMIN — SUCRALFATE 1 G: 1 TABLET ORAL at 10:48

## 2021-08-28 RX ADMIN — POTASSIUM CHLORIDE 40 MEQ: 1500 TABLET, EXTENDED RELEASE ORAL at 15:06

## 2021-08-28 RX ADMIN — ZOLEDRONIC ACID 3 MG: 4 INJECTION, SOLUTION, CONCENTRATE INTRAVENOUS at 14:57

## 2021-08-28 RX ADMIN — SODIUM CHLORIDE 50 ML/HR: 0.9 INJECTION, SOLUTION INTRAVENOUS at 13:03

## 2021-08-28 RX ADMIN — PANTOPRAZOLE SODIUM 40 MG: 40 TABLET, DELAYED RELEASE ORAL at 06:03

## 2021-08-28 RX ADMIN — DOCUSATE SODIUM 100 MG: 100 CAPSULE, LIQUID FILLED ORAL at 10:07

## 2021-08-28 RX ADMIN — GABAPENTIN 100 MG: 100 CAPSULE ORAL at 10:07

## 2021-08-28 RX ADMIN — HEPARIN SODIUM 9 UNITS/KG/HR: 10000 INJECTION, SOLUTION INTRAVENOUS at 13:01

## 2021-08-28 RX ADMIN — SUCRALFATE 1 G: 1 TABLET ORAL at 06:03

## 2021-08-28 RX ADMIN — POLYETHYLENE GLYCOL 3350 17 G: 17 POWDER, FOR SOLUTION ORAL at 10:08

## 2021-08-28 RX ADMIN — DOCUSATE SODIUM 100 MG: 100 CAPSULE, LIQUID FILLED ORAL at 17:30

## 2021-08-28 RX ADMIN — OXYCODONE HYDROCHLORIDE 2.5 MG: 5 TABLET ORAL at 11:51

## 2021-08-28 RX ADMIN — GABAPENTIN 100 MG: 100 CAPSULE ORAL at 17:30

## 2021-08-28 NOTE — ASSESSMENT & PLAN NOTE
-LLE extensive DVT with resulting RLL lobar and segmental PE  -Troponin negative, BNP mildly elevated but less than it had been previously  -Heparin gtt, will need transition to oral regimen when closer to discharge  -Patient continues to deny any shortness of breath, chest pain, lightheadedness/dizziness

## 2021-08-28 NOTE — PLAN OF CARE
Problem: PHYSICAL THERAPY ADULT  Goal: Performs mobility at highest level of function for planned discharge setting  See evaluation for individualized goals  Description: Treatment/Interventions: OT, Spoke to nursing, Gait training, Bed mobility, Patient/family training, Endurance training, LE strengthening/ROM, Functional transfer training          See flowsheet documentation for full assessment, interventions and recommendations  Note: Prognosis: Fair  Problem List: Decreased strength, Decreased endurance, Impaired balance, Decreased mobility, Decreased safety awareness, Pain, Impaired judgement, Decreased cognition, Decreased coordination, Decreased range of motion  Assessment: Pt is 80 y o  female seen for PT evaluation s/p admit to [facility] on 8/26/2021 w/ syncope found to have T7,T8, L3, L5 compression fractures, B/L DVT  PT consulted to assess pt's functional mobility and d/c needs  Order placed for PT eval and tx, w/ up w/ A and TLSO for mobility order  Comorbidities affecting pt's physical performance at time of assessment include:  has a past medical history of Chronic pain, Diverticulitis, GERD (gastroesophageal reflux disease), Hypertension, Shingles, and Shortness of breath  She also has no past medical history of Colon polyp  PTA, pt was has been 1* bedbound for the past few weeks    Personal factors affecting pt at time of IE include: inaccessible home environment, inability to ambulate household distances, decreased cognition, limited home support, decreased initiation and engagement, impulsivity, inability to perform current job functions, unable to perform physical activity, inability to perform IADLs and inability to perform ADLs   Please find objective findings from PT assessment regarding body systems outlined above with impairments and limitations including weakness, impaired balance, decreased endurance, gait deviations, pain, decreased activity tolerance, decreased functional mobility tolerance, decreased safety awareness, fall risk, SOB upon exertion, decreased skin integrity and decreased cognition  Pt educated in log roll technique for bed mobility  Required A to manage trunk and LE during bed mobility  Educated in [address] management  Pt required single step instruction for mobility with severe anxiety and fear of falling  Required HHA to complete transfers and steps to chair  Muscle mass fatigue with minimal mobility  The following objective measures performed on IE also reveal limitations: The patient's AM-PAC Basic Mobility Inpatient Short Form Low Function Raw Score 14 , Standardized Score is 22 01  A standardized score less 42 9 suggests the patient may benefit from discharge to post-acute rehab services  Please also refer to the recommendation of the Physical Therapist for safe discharge planning  Pt's clinical presentation is currently unstable/unpredictable seen in pt's presentation of pain  Pt to benefit from continued PT tx to address deficits as defined above and maximize level of functional independent mobility and consistency  From PT/mobility standpoint, recommendation at time of d/c would be post acute rehabilitation services pending progress in order to facilitate return to PLOF  F  Barriers to Discharge: Inaccessible home environment, Decreased caregiver support        PT Discharge Recommendation: Post acute rehabilitation services     PT - OK to Discharge: Yes    See flowsheet documentation for full assessment

## 2021-08-28 NOTE — PHYSICAL THERAPY NOTE
Physical Therapy Evaluation     Patient's Name: Charles Lopez    Admitting Diagnosis  Acute deep vein thrombosis (DVT) of distal vein of left lower extremity ([place] [address] ) [I82 4Z2]  Unspecified multiple injuries, initial encounter [T07  XXXA]  Compression fracture of T7 vertebra, initial encounter ([place] [address] ) [S22 060A]  Compression fracture of T8 vertebra, initial encounter ([place] [address] ) [S22 060A]  Compression fracture of L3 vertebra, initial encounter ([place] [address] ) [S32 030A]  Compression fracture of L5 vertebra, initial encounter ([place] [address] ) [S32 050A]    Problem List  Patient Active Problem List   Diagnosis    Chronic kidney disease, stage 3 unspecified ([place] [address] )    Current mild episode of major depressive disorder without prior episode ([place] [address] )    Essential hypertension with goal blood pressure less than 140/90    Hyperlipidemia    Closed wedge compression fractures of T7, T8 vertebra (HCC)    Closed compression fractures of L3, L5 lumbar vertebra, initial encounter ([place] [address] )    Lower leg DVT (deep venous thromboembolism), acute, left (HCC)    Hypercalcemia    STEPHANIE (acute kidney injury) ([place] [address] )    Syncope    Pul[facility]ism ([facility])       Past Medical History  Past Medical History:   Diagnosis Date    Chronic pain     Diverticulitis     GERD (gastroesophageal reflux disease)     Hypertension     Shingles     Shortness of breath        Past Surgical History  Past Surgical History:   Procedure Laterality Date    APPENDECTOMY      CHOLECYSTECTOMY      INCISION AND DRAINAGE RETROPHYARYNGEAL ABCESS      JOINT REPLACEMENT      PELVIC FRACTURE SURGERY      TOTAL HIP ARTHROPLASTY          08/28/21 1235   PT Last Visit   PT Visit Date 08/28/21   Note Type   Note type Evaluation   Pain Assessment   Pain Assessment Tool 0-10   Pain Score 6   Pain Location/Orientation Orientation: Bilateral   Hospital Pain Intervention(s) Ambulation/increased activity   Home Living   Type of [address] [place] [address] One level   Bathroom Shower/Tub Tub/shower unit [address] [facility],[address]; Wheelchair-manual  (transport chair)   Prior Function   Level of [place] Needs assistance with ADLs and functional mobility   Lives With Spouse  (spouse uses SPC)   Receives Help From Family  (5 children, lives closest is about an hour)   ADL Assistance Needs assistance   IADLs Needs assistance   Falls in the last 6 months 0   Vocational Retired   Restrictions/Precautions   Braces or Orthoses TLSO   Other Precautions Pain; Fall Risk;Telemetry; Chair Alarm; Bed Alarm;Multiple lines; Impulsive   General   Family/Caregiver Present No   Cognition   Orientation Level Oriented to person;Oriented to place   RLE Assessment   RLE Assessment   (grossly 3/5 with movement)   LLE Assessment   LLE Assessment   (grossly 3/5 with movement)   Coordination   Movements are Fluid and Coordinated 0   Coordination and Movement Description slow and guarded with pain and fear of falling   Bed Mobility   Supine to Sit 3  Moderate assistance   Additional items Assist x 2; Increased time required   Sit to Supine Unable to assess   Additional Comments Pt left resting in chair, call bell in reach, chair alarm active  Transfers   Sit to Stand 2  Maximal assistance   Additional items Assist x 2   Stand to Sit 2  Maximal assistance   Additional items Assist x 2; Increased time required   Ambulation/Elevation   Gait pattern Excessively slow; Shuffling;Decreased foot clearance   Gait Assistance 2  Maximal assist   Additional items Assist x 2   Assistive Device Other (Comment)  (HHA)   Distance 2'   Balance   Static Sitting Fair   Dynamic Sitting Fair -   Static Standing Poor +   Dynamic Standing Poor   Endurance Deficit   Endurance Deficit Yes   Endurance Deficit Description limited by generalized weakness, pain, fatigue, fear of falling   Activity Tolerance   Activity Tolerance Patient limited by fatigue;Patient limited by pain   Medical Staff Made Aware OT for D/C planning   Nurse Made Aware yes, nsg gave clearance to work with pt   Assessment   Prognosis Fair   Problem List Decreased strength;Decreased endurance; Impaired balance;Decreased mobility; Decreased safety awareness;Pain; Impaired judgement;Decreased cognition;Decreased coordination;Decreased range of motion   Assessment Pt is 80 y o  female seen for PT evaluation s/p admit to [facility] on 8/26/2021 w/ syncope found to have T7,T8, L3, L5 compression fractures, B/L DVT  PT consulted to assess pt's functional mobility and d/c needs  Order placed for PT eval and tx, w/ up w/ A and TLSO for mobility order  Comorbidities affecting pt's physical performance at time of assessment include:  has a past medical history of Chronic pain, Diverticulitis, GERD (gastroesophageal reflux disease), Hypertension, Shingles, and Shortness of breath  She also has no past medical history of Colon polyp  PTA, pt was has been 1* bedbound for the past few weeks    Personal factors affecting pt at time of IE include: inaccessible home environment, inability to ambulate household distances, decreased cognition, limited home support, decreased initiation and engagement, impulsivity, inability to perform current job functions, unable to perform physical activity, inability to perform IADLs and inability to perform ADLs  Please find objective findings from PT assessment regarding body systems outlined above with impairments and limitations including weakness, impaired balance, decreased endurance, gait deviations, pain, decreased activity tolerance, decreased functional mobility tolerance, decreased safety awareness, fall risk, SOB upon exertion, decreased skin integrity and decreased cognition  Pt educated in log roll technique for bed mobility  Required A to manage trunk and LE during bed mobility  Educated in [address] [facility] management  Pt required single step instruction for mobility with severe anxiety and fear of falling  Required HHA to complete transfers and steps to chair    Muscle mass fatigue with minimal mobility  The following objective measures performed on IE also reveal limitations: The patient's AM-PAC Basic Mobility Inpatient Short Form Low Function Raw Score 14 , Standardized Score is 22 01  A standardized score less 42 9 suggests the patient may benefit from discharge to post-acute rehab services  Please also refer to the recommendation of the Physical Therapist for safe discharge planning  Pt's clinical presentation is currently unstable/unpredictable seen in pt's presentation of pain  Pt to benefit from continued PT tx to address deficits as defined above and maximize level of functional independent mobility and consistency  From PT/mobility standpoint, recommendation at time of d/c would be post acute rehabilitation services pending progress in order to facilitate return to PLOF  Family very motivated to have pt progress and have family training to plan for eventually bringing pt home with increased family and HHA  Barriers to Discharge Inaccessible home environment;Decreased caregiver support   Goals   Patient Goals To go home   STG Expiration Date 09/09/21   Short Term Goal #1 1  Complete bed mobility and transfers with MoDAx1 to decrease need for caregiver in home  2  Ambulate 21' with MOd A to complete household and community mobility with Ellen  3  Improve dynamic balance to good to decrease need for UE support during ambulation  4  Improve strength to 4-/5 to improve transfer technique   Plan   Treatment/Interventions OT; Spoke to nursing;Gait training;Bed mobility; Patient/family training; Endurance training;LE strengthening/ROM; Functional transfer training   PT Frequency Other (Comment)  (3-5x)   Recommendation   PT Discharge Recommendation Post acute rehabilitation services   PT - OK to Discharge Yes   AM-PAC Basic Mobility Inpatient   Turning in Bed Without Bedrails 2   Lying on Back to Sitting on Edge of Flat Bed 2   Moving Bed to Chair 1   Standing Up From Chair 1   Walk in Room 1 Climb 3-5 Stairs 1   Basic Mobility Inpatient Raw Score 8   Turning Head Towards Sound 3   Follow Simple Instructions 3   Low Function Basic Mobility Raw Score 14   Low Function Basic Mobility Standardized Score 22 01           Steve Garcia, PT

## 2021-08-28 NOTE — TREATMENT PLAN
Upright imaging reviewed personally today, showing stable T7, T8, L3, L5 compression fractures  Final read is still pending  Plan:  Continue TLSO brace when OOB and HOB > 45 degrees  Pain control per primary team  PT/OT    Follow up in 2 weeks after repeat thoracolumbar xray  Please call with questions or concerns

## 2021-08-28 NOTE — OCCUPATIONAL THERAPY NOTE
Occupational Therapy Evaluation     Patient Name: Mario Zapata  Today's Date: 8/28/2021  Problem List  Active Problems:    Essential hypertension with goal blood pressure less than 140/90    Hyperlipidemia    Closed wedge compression fractures of T7, T8 vertebra (HCC)    Closed compression fractures of L3, L5 lumbar vertebra, initial encounter ([facility] )    Lower leg DVT (deep venous thromboembolism), acute, left (HCC)    Hypercalcemia    STEPHANIE (acute kidney injury) ([facility] )    Syncope    P[facility] embolism ([facility] )    Past Medical History  Past Medical History:   Diagnosis Date    Chronic pain     Diverticulitis     GERD (gastroesophageal reflux disease)     Hypertension     Shingles     Shortness of breath      Past Surgical History  Past Surgical History:   Procedure Laterality Date    APPENDECTOMY      CHOLECYSTECTOMY      INCISION AND DRAINAGE RETROPHYARYNGEAL ABCESS      JOINT REPLACEMENT      PELVIC FRACTURE SURGERY      TOTAL HIP ARTHROPLASTY               08/28/21 1416   OT Last Visit   OT Visit Date 08/28/21   Note Type   Note type Evaluation   Pain Assessment   Pain Assessment Tool 0-10   Pain Score 6   Pain Location/Orientation Orientation: Bilateral;Orientation: Lower; Location: Back   Hospital Pain Intervention(s) Medication (See MAR); Repositioned; Ambulation/increased activity; Emotional support   Home Living   Type of [address] [place] [address] One level   Bathroom Shower/Tub Tub/shower unit   [address] [facility],[address]; Wheelchair-manual  [facility])   Prior Function   Level of [place] Needs assistance with ADLs and functional mobility   Lives With Spouse  (Elderly spouse, uses [facility] and recently diagnosed with ca)   Receives Help From Family  (Children live in the state but not close by)   ADL Assistance Needs assistance   IADLs Needs assistance   Falls in the last 6 months 0   Lifestyle   Autonomy Per family report patient has had seady progressive decline over last 6 weeks, was in SNF for rehab but did not do well upon returnig home    Reciprocal Relationships , 5 children  Patient's  uses a SPC, he can not provide physical A   Family is supportive but not close by    Service to Others retired    Intrinsic Gratification reports limited intrests in activity, used to like to go shopping   Psychosocial   Psychosocial (WDL) X   Patient Behaviors/Mood Anxious; Fearful  (fears movement more than pain)   Subjective   Subjective "I can't I can't, I can't!!"    ADL   Eating Assistance 5  Supervision/Setup   Grooming Assistance 5  Supervision/Setup   UB Bathing Assistance 2  Maximal Assistance   LB Bathing Assistance 2  Maximal Assistance   UB Dressing Assistance 2  Maximal Assistance   LB Dressing Assistance 2  Maximal Assistance   Toileting Assistance  1  Total Assistance   Bed Mobility   Supine to Sit 3  Moderate assistance   Additional items Assist x 2   Transfers   Sit to Stand 2  Maximal assistance   Additional items Assist x 2   Balance   Static Sitting Fair   Dynamic Sitting Fair -   Static Standing Poor +   Dynamic Standing Poor   Activity Tolerance   Activity Tolerance Patient limited by fatigue;Patient limited by pain  (Activity Tolerance: poor )   Medical Staff Made Aware OT spoke with nursing and PT   (Pt/OT co-eval due to patient poor tolerance of activity)   RUE Assessment   RUE Assessment WFL  (strength 3-/5 )   LUE Assessment   LUE Assessment WFL  (strength 3-/5 )   Hand Function   Gross Motor Coordination Functional   Fine Motor Coordination Functional   Cognition   Overall Cognitive Status Impaired   Arousal/Participation Alert; Responsive   Attention Attends with cues to redirect   Orientation Level Oriented to person;Oriented to place   Memory Other (Comment)  (will continue to monitor )   Following Commands Follows one step commands with increased time or repetition  (slow to process and respond at times )   Comments family answers many questions for patient    Assessment   Limitation Decreased ADL status; Decreased UE ROM; Decreased UE strength;Decreased Safe judgement during ADL;Decreased cognition;Decreased endurance;Decreased fine motor control;Decreased self-care trans;Decreased high-level ADLs;Mood limitation   Prognosis Good   Assessment Patient is an 80year old female admitted to [facility] after she passed out at home, patient found to have closed wedge fractures of T7-T8-L3 and  L5 compression fractures, transfered to [facility] for further trauma care  Patient also found to have STEPHANIE, pulmonary embolism, bilateral DVTs and hyperkalemia  Patient with TLSO ordered for OOB activity and is activity as toleratred per trauma  Patient's family present for OT evaluation, reports patient has been "bed bound" since she returned home from [facility] rehabe approximaltely 6 weeks ago  Patient's son with signifcant concern with patient being able to return home with only 's support as  is not able to provide physical A for patient  Patient currenrly requires signficant ADL spport and A x 2 for safe mobilization OOB  OT spoke extensively with family regarding discharge recommendations based on patient's current functional status, do not feel patient can SAFELY be managed in home setting and stongly recommend post-acute rehabilitation services  Also discussed possible need for family to consider/explore more support environment for patient and  in the near future as family is not close by to provide 24 hour support  Will continue to follow for acute care OT services to progress basic ADL skills to highest level of indpendence with least amount of caregiver assistance  Goals   Patient Goals "I want to go home"    LTG Time Frame 10-14   Plan   Treatment Interventions ADL retraining;Functional transfer training;UE strengthening/ROM; Endurance training;Cognitive reorientation;Patient/family training;Equipment evaluation/education; Fine motor coordination activities; Compensatory technique education   Goal Expiration Date 09/08/21   OT Frequency 3-5x/wk   Recommendation   Recommendation PT consult   OT Discharge Recommendation Post acute rehabilitation services   AM-PAC Daily Activity Inpatient   Lower Body Dressing 1   Bathing 2   Toileting 1   Upper Body Dressing 1   Grooming 3   Eating 3   Daily Activity Raw Score 11   Daily Activity Standardized Score (Calc for Raw Score >=11) 29 04   1  Patient will complete self-feeding at Mod I level with set-up   2  Patient will complete UB bathing and dressing with Min A  3  Patient will increase LB ADLs with Mod A   4  Patient will increase toileting to Mod I level   5  Patient will increase UE strength to 4/5 in order to A with self-care   6    Patient will increase activity tolerance to G for self-care tasks seated OOB or in chair       Evelina Alex, OT

## 2021-08-28 NOTE — PROGRESS NOTES
NEPHROLOGY PROGRESS NOTE   Harmony Hull 80 y o  female MRN: [id number]  Unit/Bed#: [facility] 810-01 Encounter: [id number]  Reason for Consult:  Acute kidney injury    ASSESSMENT/PLAN:  1  Acute kidney injury most likely secondary to prerenal azotemia given volume depletion as well as hypercalcemia overall has significantly improved  2  Hypercalcemia, noted inappropriately suppressed PTH, may represent component of hyperparathyroidism  3  Monoclonal gammopathy, IgG kappa, free light chain assay 6 14  4  T7/T8/L3/L5 compression fractures  5  PE/DVT currently on heparin infusion   6  Hypophosphatemia multifactorial recommend IV replacement       PLAN:  · Patient remains persistent hypercalcemia, suspicion for possible myeloma given gammopathy spinal fractures, DVT  · Recommend Zometa 3 mg IV x1  · Recommend Hematology-Oncology consultation possible bone marrow biopsy  · Repeat phosphorus - 30mM K-Phos    SUBJECTIVE:  Seen and examined  Patient awake, appears confused  Does not appear short of breath  Denies any pain  Review of Systems    OBJECTIVE:  Current Weight: Weight - Scale: 49 9 kg (110 lb)  Vitals:    08/28/21 0254 08/28/21 0732 08/28/21 1044 08/28/21 1045   BP: 141/76 154/96 147/96 147/96   Pulse: 87      Resp: 20 19 19    Temp: 97 6 °F (36 4 °C) 98 7 °F (37 1 °C)     TempSrc:       SpO2: 99%      Weight:       Height:           Intake/Output Summary (Last 24 hours) at 8/28/2021 1131  Last data filed at 8/28/2021 1056  Gross per 24 hour   Intake 240 ml   Output 623 ml   Net -383 ml       Physical Exam  Constitutional:       Appearance: She is not ill-appearing  HENT:      Head: Normocephalic and atraumatic  Eyes:      General: No scleral icterus  Cardiovascular:      Rate and Rhythm: Normal rate and regular rhythm  Pulmonary:      Effort: Pulmonary effort is normal       Breath sounds: Normal breath sounds  Abdominal:      General: There is no distension  Palpations: Abdomen is soft  Musculoskeletal:      Right lower leg: No edema  Left lower leg: No edema  Skin:     General: Skin is warm and dry  Findings: No rash  Neurological:      Mental Status: She is alert  She is disoriented           Medications:    Current Facility-Administered Medications:     ALPRAZolam (XANAX) tablet 0 25 mg, 0 25 mg, Oral, BID PRN, Sivan Cargo, DO    cyclobenzaprine (FLEXERIL) tablet 10 mg, 10 mg, Oral, TID PRN, [facility], 10 mg at 08/26/21 1931    dicyclomine (BENTYL) capsule 20 mg, 20 mg, Oral, 4x Daily PRN, [facility], DO    docusate sodium (COLACE) capsule 100 mg, 100 mg, Oral, BID, Juanita Hakim, DO, 100 mg at 08/28/21 1007    gabapentin (NEURONTIN) capsule 100 mg, 100 mg, Oral, BID, Juanita Hakim, DO, 100 mg at 08/28/21 1007    heparin (porcine) 25,000 units in 0 45% NaCl 250 mL infusion (premix), 3-30 Units/kg/hr (Order-Specific), Intravenous, Titrated, Lexierogelio Benitez, CRNP, Last Rate: 4 5 mL/hr at 08/27/21 1145, 9 Units/kg/hr at 08/27/21 1145    heparin (porcine) injection 2,000 Units, 2,000 Units, Intravenous, Q1H PRN, Lexie [place], CRNP    heparin (porcine) injection 4,000 Units, 4,000 Units, Intravenous, Q1H PRN, Lexie [place], CRNP    pantoprazole (PROTONIX) EC tablet 40 mg, 40 mg, Oral, Early Morning, Juanita Hakim, DO, 40 mg at 08/28/21 0603    polyethylene glycol (MIRALAX) packet 17 g, 17 g, Oral, Daily, Juanita Hakim, DO, 17 g at 08/28/21 1008    sodium chloride 0 9 % infusion, 50 mL/hr, Intravenous, Continuous, Yanick Fiore DO, Last Rate: 50 mL/hr at 08/27/21 1559, 50 mL/hr at 08/27/21 1559    sucralfate (CARAFATE) tablet 1 g, 1 g, Oral, 4x Daily (AC & HS), Juanita Nina DO, 1 g at 08/28/21 1048    Laboratory Results:  Results from last 7 days   Lab Units 08/28/21  1046 08/27/21  1515 08/27/21  0255 08/26/21  1206 08/26/21  0438 08/25/21  1954   WBC Thousand/uL 10 52* 12 09*  --  13 56* 14 59* 17 59*   HEMOGLOBIN g/dL 11 2* 11 3*  --  13 0 13 5 13 6   HEMATOCRIT % 33 9* 34 2*  --  40 2 41 3 42 0   PLATELETS Thousands/uL 198 187  --  172 197 202   POTASSIUM mmol/L  --  3 3* 3 6  --  3 7 4 3   CHLORIDE mmol/L  --  105 105  --  105 102   CO2 mmol/L  --  24 26  --  25 26   BUN mg/dL  --  30* 30*  --  35* 33*   CREATININE mg/dL  --  0 74 0 87  --  1 53* 2 24*   CALCIUM mg/dL  --  10 6* 10 6* 10 9* 11 3* 11 9*   MAGNESIUM mg/dL  --  1 9  --   --   --   --    PHOSPHORUS mg/dL  --  1 8*  --   --   --   --

## 2021-08-28 NOTE — ASSESSMENT & PLAN NOTE
· Corrected calcium was 13 1 on presentation  · IVF initiated- continues at 50 cc/hr  · Nephrology consulted, current rec's as below:  · Persistent hypercalcemia although improved would continue with gentle IV fluids  · Awaiting SPEP/UPEP/free light chain assay as well as PTH related peptide

## 2021-08-28 NOTE — ASSESSMENT & PLAN NOTE
· No reported trauma  · Neurologically intact  · Neurosurgery consulted  · TLSO brace ordered  · Upright XR pending- has been unable to sit upright for films  · Multimodal pain regimen  · PT/OT

## 2021-08-28 NOTE — CONSULTS
Medical Oncology/Hematology Consult Note  Linda Stockton, female, 80 y o , 10/15/1931,  PPHP 810/PPHP 810-01, [id number]     Reason for admission:  Syncopal episode  Reason for consultation: IgG kappa monoclonal gammopathy, PE with right heart strain      ASSESSMENT AND PLAN:     1  IgG Kappa monoclonal gammopathy  Labs / Imaging reviewed:   Serum immunofixation - monoclonal gammopathy IgG kappa 1 44  SPEP - M spike 1 44, total protein 7 3  Serum FLC -  Ig Kappa 122 8, Ig lambda 20 0, Ratio 6 14  UPEP pending   Creatinine - 0 56  Corrected calcium - 11 6  CT CAP wo contrast 8/25: moderate wedge fracture T7, L3, L5  Severe new wedge deformity T8  No retropulsion     · Concern for multiple myeloma  · [facility] form completed and in patients chart, IR consulted for bone marrow bx  · Ordered beta 2 microglobulin , LD, IgG, IgA, IgM   · Recommend low-dose for body CT scan as outpatient   · [facility] messaged to schedule follow-up appointment in our office  · Consulted palliative medicine for pain management of chronic wedge compression in thoracic and lumbar spine     2  Hypercalcemia  · Management per primary team and nephrology   · Appreciate nephrology input, recommended zometa 3 mg IV     3  Lobar and segmental pulmonary emboli RLL   CTA 8/26 demonstrated lobar and segmental PE RLL with right heart strain    VAS lower limb venous duplex 8/26: acute occlusive thrombus left posterior tibial veins through common femoral vein   CrCl calculated 54 mL/min   Recommend transition from heparin gtt to lovenox 1mg/kg subcutaneous BID, no need for renal adjustment as CrCl > 30    Patient understands and is in agreement with this plan  Thank you for the opportunity to participate in this patient's care  Interval History:  Patient overall feeling well, poor historian  Able to speak with  and son ebenezer in the room  All are in agreement to proceed with bone marrow biopsy and follow-up with Dr Mirella Henry as an outpatient  Reviewed concern with multiple myeloma vs smoldering multiple myeloma vs monoclonal gammopathy of undetermined significance   is known to Dr Jamel Aguilar office for multiple myeloma treatment and is familiar with the condition  Patient is not on estrogen/progesterone therapy, never a smoker, no recent surgeries or travel  Patient has been sedentary for last month due to severe back pain likely provoking/contributing to PE/DVT  No personal or family history of malignancy  No personal or family history of bleeding/clotting or associated disorders  Currently has visiting nurses and pt coming to home to work with patient  Discussed case management/ assistance if needed with this  Patient is [de-identified] yo and <60kg if later transitioned to eliquis for prophylaxis as outpatient would recommend reduced dose of 2 5mg BID thereafter     History of present illness:  Patient is 80 female past medical history of HLD, HTN, STEPHANIE on stage III CKD, MDD who presented to Emergency Department on 08/25 due to syncopal episode at home head trauma unknown not on anticoagulation at the time  The patient cannot recall symptoms before syncopal event and has poor recollection of the event overall  Complaints of abdominal discomfort likely related to fecal impaction  Thorough evaluation by Nephrology with SPEP and FLC concerning for active multiple myeloma due to hypercalcemia, anemia, renal dysfunction  Concern for related T7, T8, L3, L5 wedge compression fractures       Imaging 8/26/21:  VAS lower limb venous duplex: acute occlusive thrombus left posterior tibial veins through common femoral vein  CTA: lobar and segmental PE RLL with right heart strain     Imaging 8/25/21:  CT head wo contrast: no intracranial abnormality  CT spine cervical wo contrast: no cervical spine fracture or malaglingment, degenerative disc changes C3 - C7   CT CAP wo contrast: moderate wedge fracture deformity T7, L3, L5, severe new wedge deformity T8, stool impaction     Review of Systems:   Review of Systems   Constitutional: Negative for chills, fever and unexpected weight change  HENT: Negative for ear pain and sore throat  Eyes: Negative for pain and visual disturbance  Respiratory: Negative for cough and shortness of breath  Cardiovascular: Negative for chest pain and palpitations  Gastrointestinal: Positive for abdominal pain and constipation  Negative for abdominal distention, diarrhea, nausea and vomiting  Genitourinary: Negative for dysuria and hematuria  Musculoskeletal: Positive for arthralgias and back pain  Skin: Negative for color change and rash  Neurological: Negative for seizures and syncope  Hematological: Negative for adenopathy  Does not bruise/bleed easily  All other systems reviewed and are negative  PHYSICAL EXAM:    /96   Pulse 87   Temp 98 7 °F (37 1 °C)   Resp 19   Ht 4' 10" (1 473 m)   Wt 49 9 kg (110 lb)   SpO2 99%   BMI 22 99 kg/m²     Physical Exam  Vitals and nursing note reviewed  Constitutional:       General: She is not in acute distress  Appearance: Normal appearance  She is not ill-appearing  HENT:      Head: Normocephalic and atraumatic  Eyes:      General: No scleral icterus  Conjunctiva/sclera: Conjunctivae normal    Cardiovascular:      Rate and Rhythm: Normal rate and regular rhythm  Pulses: Normal pulses  Heart sounds: No murmur heard  Pulmonary:      Effort: Pulmonary effort is normal       Breath sounds: Normal breath sounds  No wheezing or rhonchi  Abdominal:      General: Abdomen is flat  Bowel sounds are normal       Palpations: Abdomen is soft  Tenderness: There is no abdominal tenderness  There is no guarding  Musculoskeletal:      Cervical back: Normal range of motion and neck supple  Right lower leg: No edema  Left lower leg: No edema  Lymphadenopathy:      Cervical: No cervical adenopathy     Skin: General: Skin is warm and dry  Capillary Refill: Capillary refill takes less than 2 seconds  Findings: No bruising  Neurological:      Mental Status: She is alert and oriented to person, place, and time  LABS:     Recent Results (from the past 48 hour(s))   Blood culture    Collection Time: 08/26/21  6:52 PM    Specimen: Arm, Left; Blood   Result Value Ref Range    Blood Culture No Growth at 24 hrs  Immunoglobulin free LT chains blood    Collection Time: 08/26/21  6:52 PM   Result Value Ref Range    Ig Osgood Free Light Chain 122 8 (H) 3 3 - 19 4 mg/L    Ig Lambda Free Light Chain 20 0 5 7 - 26 3 mg/L    Kappa/Lambda FluidC Ratio 6 14 (H) 0 26 - 1 65   Protein electrophoresis, serum    Collection Time: 08/26/21  6:52 PM   Result Value Ref Range    A/G Ratio 0 68 (L) 1 10 - 1 80    Albumin Electrophoresis 40 4 (L) 52 0 - 65 0 %    Albumin CONC 2 95 (L) 3 50 - 5 00 g/dl    Alpha 1 7 2 (H) 2 5 - 5 0 %    ALPHA 1 CONC 0 53 (H) 0 10 - 0 40 g/dL    Alpha 2 12 9 7 0 - 13 0 %    ALPHA 2 CONC 0 94 0 40 - 1 20 g/dL    Beta-1 5 4 5 0 - 13 0 %    BETA 1 CONC 0 39 (L) 0 40 - 0 80 g/dL    Beta-2 8 9 (H) 2 0 - 8 0 %    BETA 2 CONC 0 65 (H) 0 20 - 0 50 g/dL    Gamma Globulin 25 2 (H) 12 0 - 22 0 %    GAMMA CONC 1 84 (H) 0 50 - 1 60 g/dL    M Peak ID 1 19 70 %    M PEAK 1 CONC 1 44 g/dL    Total Protein 7 3 6 4 - 8 2 g/dL    SPEP Interpretation See Comment    APTT    Collection Time: 08/26/21  6:52 PM   Result Value Ref Range    PTT >210 (HH) 23 - 37 seconds   Immunofixation, Serum(Reflex Only-Do Not Order)    Collection Time: 08/26/21  6:52 PM   Result Value Ref Range    Immunofixation Interpretation See Comment    Blood culture    Collection Time: 08/26/21  7:22 PM    Specimen: Arm, Left; Blood   Result Value Ref Range    Blood Culture No Growth at 24 hrs      ECG 12 lead    Collection Time: 08/26/21  9:57 PM   Result Value Ref Range    Ventricular Rate 86 BPM    Atrial Rate 86 BPM    NH Interval 150 ms QRSD Interval 82 ms    QT Interval 336 ms    QTC Interval 402 ms    P Axis 44 degrees    QRS Axis -20 degrees    T Wave Axis 12 degrees   ECG 12 lead    Collection Time: 08/26/21  9:59 PM   Result Value Ref Range    Ventricular Rate 92 BPM    Atrial Rate 92 BPM    OK Interval 154 ms    QRSD Interval 82 ms    QT Interval 328 ms    QTC Interval 405 ms    P Axis 48 degrees    QRS Axis -19 degrees    T Wave Axis 9 degrees   Troponin I    Collection Time: 08/26/21 10:18 PM   Result Value Ref Range    Troponin I 0 02 <=0 04 ng/mL   NT-BNP PRO    Collection Time: 08/26/21 10:30 PM   Result Value Ref Range    NT-proBNP 550 (H) <450 pg/mL   APTT    Collection Time: 08/27/21  2:55 AM   Result Value Ref Range    PTT >210 (HH) 23 - 37 seconds   Basic metabolic panel    Collection Time: 08/27/21  2:55 AM   Result Value Ref Range    Sodium 135 (L) 136 - 145 mmol/L    Potassium 3 6 3 5 - 5 3 mmol/L    Chloride 105 100 - 108 mmol/L    CO2 26 21 - 32 mmol/L    ANION GAP 4 4 - 13 mmol/L    BUN 30 (H) 5 - 25 mg/dL    Creatinine 0 87 0 60 - 1 30 mg/dL    Glucose 72 65 - 140 mg/dL    Glucose, Fasting 72 65 - 99 mg/dL    Calcium 10 6 (H) 8 3 - 10 1 mg/dL    eGFR 59 ml/min/1 73sq m   APTT    Collection Time: 08/27/21 10:03 AM   Result Value Ref Range     (HH) 23 - 37 seconds   Comprehensive metabolic panel    Collection Time: 08/27/21  3:15 PM   Result Value Ref Range    Sodium 135 (L) 136 - 145 mmol/L    Potassium 3 3 (L) 3 5 - 5 3 mmol/L    Chloride 105 100 - 108 mmol/L    CO2 24 21 - 32 mmol/L    ANION GAP 6 4 - 13 mmol/L    BUN 30 (H) 5 - 25 mg/dL    Creatinine 0 74 0 60 - 1 30 mg/dL    Glucose 81 65 - 140 mg/dL    Calcium 10 6 (H) 8 3 - 10 1 mg/dL    Corrected Calcium 11 9 (H) 8 3 - 10 1 mg/dL    AST 15 5 - 45 U/L    ALT 11 (L) 12 - 78 U/L    Alkaline Phosphatase 179 (H) 46 - 116 U/L    Total Protein 6 9 6 4 - 8 2 g/dL    Albumin 2 4 (L) 3 5 - 5 0 g/dL    Total Bilirubin 0 71 0 20 - 1 00 mg/dL    eGFR 72 ml/min/1 73sq m   CBC and differential    Collection Time: 08/27/21  3:15 PM   Result Value Ref Range    WBC 12 09 (H) 4 31 - 10 16 Thousand/uL    RBC 3 47 (L) 3 81 - 5 12 Million/uL    Hemoglobin 11 3 (L) 11 5 - 15 4 g/dL    Hematocrit 34 2 (L) 34 8 - 46 1 %    MCV 99 (H) 82 - 98 fL    MCH 32 6 26 8 - 34 3 pg    MCHC 33 0 31 4 - 37 4 g/dL    RDW 14 5 11 6 - 15 1 %    MPV 11 1 8 9 - 12 7 fL    Platelets 559 353 - 160 Thousands/uL    nRBC 0 /100 WBCs    Neutrophils Relative 75 43 - 75 %    Immat GRANS % 1 0 - 2 %    Lymphocytes Relative 12 (L) 14 - 44 %    Monocytes Relative 11 4 - 12 %    Eosinophils Relative 0 0 - 6 %    Basophils Relative 1 0 - 1 %    Neutrophils Absolute 9 21 (H) 1 85 - 7 62 Thousands/µL    Immature Grans Absolute 0 11 0 00 - 0 20 Thousand/uL    Lymphocytes Absolute 1 40 0 60 - 4 47 Thousands/µL    Monocytes Absolute 1 27 (H) 0 17 - 1 22 Thousand/µL    Eosinophils Absolute 0 04 0 00 - 0 61 Thousand/µL    Basophils Absolute 0 06 0 00 - 0 10 Thousands/µL   Calcium, ionized    Collection Time: 08/27/21  3:15 PM   Result Value Ref Range    Calcium, Ionized 1 40 (H) 1 12 - 1 32 mmol/L   Magnesium    Collection Time: 08/27/21  3:15 PM   Result Value Ref Range    Magnesium 1 9 1 6 - 2 6 mg/dL   Phosphorus    Collection Time: 08/27/21  3:15 PM   Result Value Ref Range    Phosphorus 1 8 (L) 2 3 - 4 1 mg/dL   APTT    Collection Time: 08/27/21  4:50 PM   Result Value Ref Range    PTT 71 (H) 23 - 37 seconds   APTT    Collection Time: 08/27/21 11:17 PM   Result Value Ref Range    PTT 71 (H) 23 - 37 seconds   APTT    Collection Time: 08/28/21  6:06 AM   Result Value Ref Range    PTT 71 (H) 23 - 37 seconds   CBC and differential    Collection Time: 08/28/21 10:46 AM   Result Value Ref Range    WBC 10 52 (H) 4 31 - 10 16 Thousand/uL    RBC 3 48 (L) 3 81 - 5 12 Million/uL    Hemoglobin 11 2 (L) 11 5 - 15 4 g/dL    Hematocrit 33 9 (L) 34 8 - 46 1 %    MCV 97 82 - 98 fL    MCH 32 2 26 8 - 34 3 pg    MCHC 33 0 31 4 - 37 4 g/dL    RDW 14 2 11 6 - 15 1 %    MPV 11 3 8 9 - 12 7 fL    Platelets 283 134 - 669 Thousands/uL    nRBC 0 /100 WBCs    Neutrophils Relative 72 43 - 75 %    Immat GRANS % 1 0 - 2 %    Lymphocytes Relative 13 (L) 14 - 44 %    Monocytes Relative 12 4 - 12 %    Eosinophils Relative 1 0 - 6 %    Basophils Relative 1 0 - 1 %    Neutrophils Absolute 7 66 (H) 1 85 - 7 62 Thousands/µL    Immature Grans Absolute 0 12 0 00 - 0 20 Thousand/uL    Lymphocytes Absolute 1 35 0 60 - 4 47 Thousands/µL    Monocytes Absolute 1 23 (H) 0 17 - 1 22 Thousand/µL    Eosinophils Absolute 0 10 0 00 - 0 61 Thousand/µL    Basophils Absolute 0 06 0 00 - 0 10 Thousands/µL   Comprehensive metabolic panel    Collection Time: 21 10:46 AM   Result Value Ref Range    Sodium 136 136 - 145 mmol/L    Potassium 3 0 (L) 3 5 - 5 3 mmol/L    Chloride 107 100 - 108 mmol/L    CO2 21 21 - 32 mmol/L    ANION GAP 8 4 - 13 mmol/L    BUN 24 5 - 25 mg/dL    Creatinine 0 56 (L) 0 60 - 1 30 mg/dL    Glucose 71 65 - 140 mg/dL    Calcium 10 3 (H) 8 3 - 10 1 mg/dL    Corrected Calcium 11 6 (H) 8 3 - 10 1 mg/dL    AST 19 5 - 45 U/L    ALT 12 12 - 78 U/L    Alkaline Phosphatase 181 (H) 46 - 116 U/L    Total Protein 7 1 6 4 - 8 2 g/dL    Albumin 2 4 (L) 3 5 - 5 0 g/dL    Total Bilirubin 0 75 0 20 - 1 00 mg/dL    eGFR 83 ml/min/1 73sq m   Calcium, ionized    Collection Time: 21 10:46 AM   Result Value Ref Range    Calcium, Ionized 1 37 (H) 1 12 - 1 32 mmol/L   Phosphorus    Collection Time: 21 10:46 AM   Result Value Ref Range    Phosphorus 1 3 (L) 2 3 - 4 1 mg/dL       Echo complete with contrast if indicated    Result Date: 2021  Narrative: [address], [address] [facility] [phone] Transthoracic Echocardiogram 2D, M-mode, Doppler, and Color Doppler Study date:  26-Aug-2021 Patient: Sparkle Hoyt MR number: [id number] Account number: [de-identified] : 15-Oct-1931 Age: 80 years Gender: Female Status: Outpatient Location: Emergency room Height: 58 in Weight: 109 8 lb BP: 138/ 63 mmHg Indications: Syncope Diagnoses: R55  - Syncope and collapse Sonographer:  Britt Jackson RDCS Primary Physician:  Pia Desai MD Referring Physician:  Don Mortimer, DO Group:  [id number] Cardiology Fellow: Luda Mohamud MD Interpreting Physician:  Karlene Menendez MD SUMMARY PROCEDURE INFORMATION: This was a technically difficult study  LEFT VENTRICLE: Systolic function was normal  Ejection fraction was estimated to be 60 %  There were no regional wall motion abnormalities  MITRAL VALVE: There was moderate annular calcification  There was mild to moderate regurgitation  TRICUSPID VALVE: There was trace regurgitation  PULMONIC VALVE: There was trace regurgitation  HISTORY: PRIOR HISTORY: Syncope PROCEDURE: The procedure was performed in the emergency room  This was a routine study  The transthoracic approach was used  The study included complete 2D imaging, M-mode, complete spectral Doppler, and color Doppler  The heart rate was 84 bpm, at the start of the study  Images were obtained from the parasternal, apical, subcostal, and suprasternal notch acoustic windows  Echocardiographic views were limited due to restricted patient mobility, poor patient compliance, and lung interference  This was a technically difficult study  LEFT VENTRICLE: Size was normal  Systolic function was normal  Ejection fraction was estimated to be 60 %  There were no regional wall motion abnormalities  Wall thickness was normal  No evidence of apical thrombus  DOPPLER: Left ventricular diastolic function parameters were normal  RIGHT VENTRICLE: The size was normal  Systolic function was normal  Wall thickness was normal  LEFT ATRIUM: Size was normal  RIGHT ATRIUM: Size was normal  MITRAL VALVE: There was moderate annular calcification  Valve structure was normal  There was normal leaflet separation   DOPPLER: The transmitral velocity was within the normal range  There was no evidence for stenosis  There was mild to moderate regurgitation  AORTIC VALVE: The valve was trileaflet  Leaflets exhibited mildly increased thickness, mild calcification, and normal cuspal separation  DOPPLER: Transaortic velocity was within the normal range  There was no evidence for stenosis  There was no significant regurgitation  TRICUSPID VALVE: Not well visualized  DOPPLER: The transtricuspid velocity was within the normal range  There was no evidence for stenosis  There was trace regurgitation  The tricuspid jet envelope definition was inadequate for estimation of RV systolic pressure  There are no indirect findings which would suggest moderate or severe pulmonary hypertension  PULMONIC VALVE: Not well visualized  DOPPLER: The transpulmonic velocity was within the normal range  There was trace regurgitation  PERICARDIUM: There was no pericardial effusion  The pericardium was normal in appearance  AORTA: The root exhibited normal size  SYSTEMIC VEINS: IVC: The inferior vena cava was normal in size  Respirophasic changes were normal  SYSTEM MEASUREMENT TABLES 2D %FS: 32 68 % Ao Diam: 2 83 cm Ao asc: 3 08 cm EDV(Teich): 31 55 ml EF(Teich): 62 88 % ESV(Teich): 11 71 ml IVSd: 1 39 cm LA Diam: 2 52 cm LAAs A4C: 7 74 cm2 LAESV A-L A4C: 14 13 ml LAESV MOD A4C: 13 63 ml LALs A4C: 3 6 cm LVEDV MOD A4C: 20 52 ml LVEF MOD A4C: 55 35 % LVESV MOD A4C: 9 16 ml LVIDd: 2 88 cm LVIDs: 1 94 cm LVLd A4C: 5 26 cm LVLs A4C: 4 46 cm LVPWd: 0 69 cm RAEDV A-L: 11 24 ml RAEDV MOD: 10 47 ml RALd: 3 75 cm RVIDd: 3 04 cm SV MOD A4C: 11 36 ml SV(Teich): 19 84 ml CW AV Env  Ti: 233 68 ms AV VTI: 18 64 cm AV Vmax: 1 18 m/s AV Vmean: 0 8 m/s AV maxP 61 mmHg AV meanP 95 mmHg MM TAPSE: 1 41 cm PW LVOT Env  Ti: 238 25 ms LVOT VTI: 15 86 cm LVOT Vmax: 1 m/s LVOT Vmean: 0 67 m/s LVOT maxPG: 3 98 mmHg LVOT meanP 05 mmHg MV A Rajesh: 1 11 m/s MV Dec [place]: 2 53 m/s2 MV DecT: 200 61 ms MV E Rajesh: 0 51 m/s MV E/A Ratio: 0 46 MV PHT: 58 18 ms MVA By PHT: 3 78 cm2 [employer] Accredited Echocardiography Laboratory Prepared and electronically signed by Roderick Laureano MD Signed 26-Aug-2021 15:56:33     EGD    Result Date: 8/2/2021  Narrative:  [facility] End[facility] [id number] [phone] DATE OF SERVICE: 8/02/21 PHYSICIAN(S): Bhavna Chisholm DO - Attending Physician INDICATION: Generalized abdominal pain, Chronic idiopathic constipation, Abnormal CT of the abdomen, Gastroesophageal reflux disease, unspecified whether esophagitis present POST-OP DIAGNOSIS: See the impression below  PREPROCEDURE: Informed consent was obtained for the procedure, including sedation  Risks of perforation, hemorrhage, adverse drug reaction and aspiration were discussed  The patient was placed in the left lateral decubitus position  Patient was explained about the risks and benefits of the procedure  Risks including but not limited to bleeding, infection, and perforation were explained in detail  Also explained about less than 100% sensitivity with the exam and other alternatives  DETAILS OF PROCEDURE: Patient was taken to the procedure room where a time out was performed to confirm correct patient and correct procedure  The patient underwent monitored anesthesia care, which was administered by an anesthesia professional  The patient's blood pressure, heart rate, level of consciousness, respirations and oxygen were monitored throughout the procedure  The scope was advanced to the second part of the duodenum  Retroflexion was performed in the fundus  The patient experienced no blood loss  The procedure was not difficult  The patient tolerated the procedure well  There were no apparent complications   ANESTHESIA INFORMATION: ASA: III Anesthesia Type: IV Sedation with Anesthesia MEDICATIONS: No administrations occurring from 1255 to 1306 on 08/02/21 FINDINGS: The cricopharynx, upper third of the esophagus, middle third of the esophagus, lower third of the esophagus, GE junction and Z-line appeared normal  Z-line is 27 cm from the incisors  3 cm sliding hiatal hernia (type I hiatal hernia) without Ardeth  lesions present - GE junction 27 cm from the incisors, diaphragmatic impression 30 cm from the incisors The body of the stomach, antrum, prepyloric region and pylorus appeared normal  The duodenal bulb and 2nd part of the duodenum appeared normal  SPECIMENS: * No specimens in log *     Impression: 1  Small sliding hiatal hernia, but otherwise normal EGD RECOMMENDATION: There is no recommended follow-up for this procedure  Donah Schirmer, DO, [place] [place]     Colonoscopy    Result Date: 8/2/2021  Narrative:  [facility] Endoscopy [facility] [id number] [phone] DATE OF SERVICE: 8/02/21 PHYSICIAN(S): Donah Schirmer, DO - Attending Physician INDICATION: Generalized abdominal pain, Chronic idiopathic constipation, Abnormal CT of the abdomen, Gastroesophageal reflux disease, unspecified whether esophagitis present Colonoscopy performed for a diagnostic indication  POST-OP DIAGNOSIS: See the impression below  HISTORY: Prior colonoscopy: 10 years ago  BOWEL PREPARATION: Miralax/Dulcolax PREPROCEDURE: Informed consent was obtained for the procedure, including sedation  Risks including but not limited to bleeding, infection, perforation, adverse drug reaction and aspiration were explained in detail  Also explained about less than 100% sensitivity with the exam and other alternatives  The patient was placed in the left lateral decubitus position  DETAILS OF PROCEDURE: Patient was taken to the procedure room where a time out was performed to confirm correct patient and correct procedure   The patient underwent monitored anesthesia care, which was administered by an anesthesia professional  The patient's blood pressure, heart rate, level of consciousness, oxygen and respirations were monitored throughout the procedure  A digital rectal exam was performed  The scope was introduced through the anus and advanced to the cecum  Retroflexion was performed in the rectum  The quality of bowel preparation was evaluated using the [facility] Bowel Preparation Scale with scores of: right colon = 2, transverse colon = 2, left colon = 2  The total BBPS score was 6  Bowel prep was adequate  The patient experienced no blood loss  The procedure was not difficult  The patient tolerated the procedure well  There were no apparent complications  ANESTHESIA INFORMATION: ASA: III Anesthesia Type: IV Sedation with Anesthesia MEDICATIONS: No administrations occurring from 1255 to 1330 on 08/02/21 FINDINGS: Few small, mild scattered diverticula containing no content with no bleeding in the sigmoid colon The cecum, ascending colon, hepatic flexure, transverse colon, splenic flexure, descending colon, rectosigmoid and rectum appeared normal  EVENTS: Procedure Events Event Event Time ENDO CECUM REACHED 8/2/2021  1:23 PM ENDO SCOPE OUT TIME 8/2/2021  1:29 PM SPECIMENS: * No specimens in log * EQUIPMENT: Colonoscope -[id number]     Impression: 1  Diverticulosis coli, sigmoid, RECOMMENDATION: No further screening colonoscopies necessary based on the patient's age Increase fiber supplementation to include fruits, vegetables, whole grain foods, daily Lory Poster, DO, [facility] [place], [place]     CT chest abdomen pelvis wo contrast    Result Date: 8/25/2021  Narrative: CT CHEST, ABDOMEN AND PELVIS WITHOUT IV CONTRAST INDICATION:   chronic abdominal pain, T spine and L spine tenderness post syncopal episode  COMPARISON:  Compared with CT of 6/7/2021 TECHNIQUE: CT examination of the chest, abdomen and pelvis was performed without intravenous contrast   Axial, sagittal, and coronal 2D reformatted images were created from the source data and submitted for interpretation  Radiation dose length product (DLP) for this visit:  743 04 mGy-cm     This examination, like all CT scans performed in the [facility], was performed utilizing techniques to minimize radiation dose exposure, including the use of iterative  reconstruction and automated exposure control  Enteric contrast was administered  FINDINGS: CHEST LUNGS:  Lungs are clear  There is no tracheal or endobronchial lesion  PLEURA:  Unremarkable  HEART/GREAT VESSELS: Dilated ascending thoracic aorta measuring 3 5 cm  Unremarkable for patient's age  MEDIASTINUM AND LINDSEY:  Unremarkable  CHEST WALL AND LOWER NECK:   Unremarkable  ABDOMEN LIVER/BILIARY TREE:  Unremarkable  GALLBLADDER:  Gallbladder is surgically absent  SPLEEN:  Unremarkable  PANCREAS:  Unremarkable  ADRENAL GLANDS:  Unremarkable  KIDNEYS/URETERS:  2 mm right kidney midpole calculus in left kidney midpole calculus    No hydronephrosis  STOMACH AND BOWEL:  Stool impacted rectosigmoid colon  Sigmoid diverticulosis  APPENDIX:  There are expected postoperative changes of appendectomy  ABDOMINOPELVIC CAVITY:  No ascites  No pneumoperitoneum  No lymphadenopathy  VESSELS:  Unremarkable for patient's age  PELVIS REPRODUCTIVE ORGANS:  Normal uterine like structure not appreciated  URINARY BLADDER:  Unremarkable  ABDOMINAL WALL/INGUINAL REGIONS:  Unremarkable  OSSEOUS STRUCTURES: Loss of height, sclerosis along the superior endplate of L3, L5 is new  Anterolisthesis of L5 over lumbarised S1 is unchanged  Wedge fracture deformity of T7 and T8  This appears new from portable x-ray of 6/7/2021 No retropulsion  Posterior columns are intact  Streak artifacts from right hip prosthesis  Impression: Moderate wedge fracture deformity of T7, L3 and L5 and severe wedge deformity of T8 appears new  Stool impacted rectosigmoid colon  Workstation performed: [id number]     CT head without contrast    Result Date: 8/25/2021  Narrative: CT BRAIN - WITHOUT CONTRAST INDICATION:   syncope, unknown head strike  COMPARISON:  None   TECHNIQUE:  CT examination of the brain was performed  In addition to axial images, sagittal and coronal 2D reformatted images were created and submitted for interpretation  Radiation dose length product (DLP) for this visit:  805 mGy-cm   This examination, like all CT scans performed in the [facility], was performed utilizing techniques to minimize radiation dose exposure, including the use of iterative reconstruction and automated exposure control  IMAGE QUALITY:  Diagnostic  FINDINGS: PARENCHYMA: Decreased attenuation is noted in periventricular and subcortical white matter demonstrating an appearance that is statistically most likely to represent moderate microangiopathic change  No CT signs of acute infarction  No intracranial mass, mass effect or midline shift  No acute parenchymal hemorrhage  VENTRICLES AND EXTRA-AXIAL SPACES:  Ventricles and extra-axial CSF spaces are prominent commensurate with the degree of volume loss  No hydrocephalus  No acute extra-axial hemorrhage  VISUALIZED ORBITS AND PARANASAL SINUSES:  Unremarkable  CALVARIUM AND EXTRACRANIAL SOFT TISSUES:  Normal      Impression: No acute intracranial abnormality  Workstation performed: [id number]     CT spine cervical without contrast    Result Date: 8/25/2021  Narrative: CT CERVICAL SPINE - WITHOUT CONTRAST INDICATION:   syncope, unknown head strike  COMPARISON:  None  TECHNIQUE:  CT examination of the cervical spine was performed without intravenous contrast   Contiguous axial images were obtained  Sagittal and coronal reconstructions were performed  Radiation dose length product (DLP) for this visit:  385 mGy-cm   This examination, like all CT scans performed in the [facility], was performed utilizing techniques to minimize radiation dose exposure, including the use of iterative reconstruction and automated exposure control  IMAGE QUALITY:  Diagnostic  FINDINGS: ALIGNMENT:  Normal alignment of the cervical spine  No subluxation  VERTEBRAL BODIES:  No fracture  DEGENERATIVE CHANGES:  Degenerative disc changes at multiple levels from C3 to C7 PREVERTEBRAL AND PARASPINAL SOFT TISSUES:  Unremarkable  THORACIC INLET:  Normal      Impression: No cervical spine fracture or traumatic malalignment  Degenerative disc changes from C3 to C7  Workstation performed: [id number]     CTA chest pe study    Result Date: 8/26/2021  Narrative: CTA - CHEST WITH IV CONTRAST - PULMONARY ANGIOGRAM INDICATION:   PE suspected, high pretest prob DVT in LLE  COMPARISON: Unenhanced CT from August 25, 2021  TECHNIQUE: CTA examination of the chest was performed using angiographic technique according to a protocol specifically tailored to evaluate for pulmonary embolism  Axial, sagittal, and coronal 2D reformatted images were created from the source data and  submitted for interpretation  In addition, coronal 3D MIP postprocessing was performed on the acquisition scanner  Radiation dose length product (DLP) for this visit:  248 35 mGy-cm   This examination, like all CT scans performed in the [facility], was performed utilizing techniques to minimize radiation dose exposure, including the use of iterative  reconstruction and automated exposure control  IV Contrast:  56 mL of iodixanol (VISIPAQUE)  FINDINGS: PULMONARY ARTERIAL TREE:  Acute lobar and segmental thromboemboli in the right lower lobe  LUNGS:  Mild subsegmental atelectasis in both lower lobes, right middle lobe and the lingula  Lungs otherwise clear  PLEURA:  Unremarkable  HEART/GREAT VESSELS:  Unremarkable for patient's age  RV/LV ratio: 0 92  MEDIASTINUM AND LINDSEY: No lymphadenopathy or mass  Small hiatal hernia  Esophagus unremarkable  Trachea and main stem bronchi normal  CHEST WALL AND LOWER NECK:   Unremarkable  VISUALIZED STRUCTURES IN THE UPPER ABDOMEN:  Unremarkable  OSSEOUS STRUCTURES:  Compression fractures of T7 and T8 8 again demonstrated  No new fractures  Impression: Lobar and segmental pulmonary emboli in the right lower lobe  The calculated ratio of right ventricular to left ventricular diameter (RV/LV ratio) is 0 92  This is greater than 0 9, which is abnormal and suggests right heart strain  An abnormal RV/LV ratio has been shown to be associated with an increased risk of 30 day mortality in the setting of acute pulmonary embolism  Urgent consultation with the medical critical care team is recommended  I personally discussed this study with Dian Nunez on 8/26/2021 at 8:56 PM  Workstation performed: [id number]     CT recon only thoracolumbar (No Charge)    Result Date: 8/25/2021  Narrative: CT THORACIC AND LUMBAR SPINE INDICATION:   back pain post syncope  COMPARISON:  Compared with CT abdomen of 6/7/2021 and chest x-ray of 6/7/2021  TECHNIQUE: Axial CT examination of the thoracic and lumbar spine was obtained utilizing reconstructed images from CT of the chest abdomen and pelvis performed the same day  Images were reformatted in the sagittal and coronal planes  This examination, like all CT scans performed in the [facility], was performed utilizing techniques to minimize radiation dose exposure, including the use of iterative reconstruction and automated exposure control  FINDINGS: ALIGNMENT: Normal alignment of lumbar vertebral bodies  No subluxation  VERTEBRAL BODIES: Loss of height, sclerosis along the superior endplate of L3, L5 is new  Anterolisthesis of L5 over lumbarised S1 is unchanged  Moderate right fracture deformity of T7 and and severe wedge deformity of T8  This appears new from portable x-ray of 6/7/2021  No retropulsion  Posterior columns are intact  DEGENERATIVE CHANGES: Anterolisthesis of L5 over S1  PREVERTEBRAL AND PARASPINAL SOFT TISSUES: Normal      Impression: New moderate wedge fracture deformities of T7, L3, L5 with severe fracture deformity of T8  No retropulsion   Workstation performed: [id number]     VAS lower limb venous duplex study, unilateral/limited    Result Date: 8/26/2021  Narrative:  [facility] REPORT CLINICAL: Indications: Patient presents with Left lower extremity swelling since recent fall  Operative History: Patient denies previous cardiovascular surgeries Risk Factors The patient has history of HTN  She has no history of Hyperlipidemia  FINDINGS:  Left        Impression          CFV         Thrombosed (acute)  PFV         Thrombosed (acute)  FV Prox     Thrombosed (acute)  FV Mid      Thrombosed (acute)  FV Dist     Thrombosed (acute)  Popliteal   Thrombosed (acute)  PostTibial  Thrombosed (acute)     CONCLUSION: Impression: RIGHT LOWER LIMB LIMITED: Evaluation shows no evidence of thrombus in the common femoral vein  Doppler evaluation shows a normal response to augmentation maneuvers  LEFT LOWER LIMB: Acute occlusive thrombus noted in the paired posterior tibial veins extending through to the common femoral vein  No evidence of superficial thrombophlebitis noted  Doppler evaluation shows a normal response to augmentation maneuvers  Popliteal, posterior tibial and anterior tibial arterial Doppler waveforms are biphasic  Technical findings were given to Dr Jade Askew via [email or web address]    SIGNATURE: Electronically Signed by: Miriam Montelongo MD, [address] on 2021-08-26 11:52:04 PM        HISTORY:    Past Medical History:   Diagnosis Date    Chronic pain     Diverticulitis     GERD (gastroesophageal reflux disease)     Hypertension     Shingles     Shortness of breath        Past Surgical History:   Procedure Laterality Date    APPENDECTOMY      CHOLECYSTECTOMY      INCISION AND DRAINAGE RETROPHYARYNGEAL ABCESS      JOINT REPLACEMENT      PELVIC FRACTURE SURGERY      TOTAL HIP ARTHROPLASTY         Family History   Problem Relation Age of Onset    Stroke Mother        Social History     Socioeconomic History    Marital status: /Civil Union     Spouse name: None    Number of children: None    Years of education: None    Highest education level: None   Occupational History    None   Tobacco Use    Smoking status: Never Smoker    Smokeless tobacco: Never Used   Vaping Use    Vaping Use: Never used   Substance and Sexual Activity    Alcohol use: Never    Drug use: Never    Sexual activity: Not Currently   Other Topics Concern    None   Social History Narrative    None     Social Determinants of Health     Financial Resource Strain:     Difficulty of Paying Living Expenses:    Food Insecurity:     Worried About Running Out of Food in the Last Year:     Ran Out of Food in the Last Year:    Transportation Needs:     Lack of Transportation (Medical):      Lack of Transportation (Non-Medical):    Physical Activity:     Days of Exercise per Week:     Minutes of Exercise per Session:    Stress:     Feeling of Stress :    Social Connections:     Frequency of Communication with Friends and Family:     Frequency of Social Gatherings with Friends and Family:     Attends Anabaptism Services:     Active Member of Clubs or Organizations:     Attends Club or Organization Meetings:     Marital Status:    Intimate Partner Violence:     Fear of Current or Ex-Partner:     Emotionally Abused:     Physically Abused:     Sexually Abused:          Current Facility-Administered Medications:     acetaminophen (TYLENOL) tablet 650 mg, 650 mg, Oral, Q6H PRN, Harris Buckley MD    ALPRAZolam Garcia Rohan) tablet 0 25 mg, 0 25 mg, Oral, BID PRN, Daily Lock, DO    cyclobenzaprine (FLEXERIL) tablet 10 mg, 10 mg, Oral, TID PRN, Denise Bickers Hakim, DO, 10 mg at 08/26/21 0926    dicyclomine (BENTYL) capsule 20 mg, 20 mg, Oral, 4x Daily PRN, Denise Bickers Hakim, DO    docusate sodium (COLACE) capsule 100 mg, 100 mg, Oral, BID, Juanita Hakim, DO, 100 mg at 08/28/21 1007    gabapentin (NEURONTIN) capsule 100 mg, 100 mg, Oral, BID, Juanita Hakim, DO, 100 mg at 08/28/21 1007    heparin (porcine) 25,000 units in 0 45% NaCl 250 mL infusion (premix), 3-30 Units/kg/hr (Order-Specific), Intravenous, Titrated, Claudean Stai, CRNP, Last Rate: 4 5 mL/hr at 08/27/21 1145, 9 Units/kg/hr at 08/27/21 1145    heparin (porcine) injection 2,000 Units, 2,000 Units, Intravenous, Q1H PRN, Claudean Stai, CRNP    heparin (porcine) injection 4,000 Units, 4,000 Units, Intravenous, Q1H PRN, Aaroneaanival Torres CRNP    ond[facility]) injection 4 mg, 4 mg, Intravenous, Q6H PRN, Mandy Mathis MD    oxyCODONE (ROXICODONE) IR tablet 2 5 mg, 2 5 mg, Oral, Q4H PRN, Yaquelin Templeton MD, 2 5 mg at 08/28/21 1151    pantoprazole (PROTONIX) EC tablet 40 mg, 40 mg, Oral, Early Morning, Juanita Nina DO, 40 mg at 08/28/21 0603    polyethylene glycol (MIRALAX) packet 17 g, 17 g, Oral, Daily, Juanita Nina DO, 17 g at 08/28/21 1008    sodium chloride 0 9 % infusion, 50 mL/hr, Intravenous, Continuous, Duyen Fiore DO, Last Rate: 50 mL/hr at 08/27/21 1559, 50 mL/hr at 08/27/21 1559    sucralfate (CARAFATE) tablet 1 g, 1 g, Oral, 4x Daily (AC & HS), Juanita Nina DO, 1 g at 08/28/21 1048    Medications Prior to Admission   Medication    acetaminophen (TYLENOL) 325 mg tablet    atorvastatin (LIPITOR) 20 mg tablet    dicyclomine (BENTYL) 20 mg tablet    docusate sodium (COLACE) 100 mg capsule    gabapentin (NEURONTIN) 100 mg capsule    lidocaine (LIDODERM) 5 %    losartan (COZAAR) 50 mg tablet    pantoprazole (PROTONIX) 40 mg tablet    polyethylene glycol (MIRALAX) 17 g packet    potassium chloride (Klor-Con) 10 mEq tablet    sucralfate (CARAFATE) 1 g tablet    traMADol (ULTRAM) 50 mg tablet    Zoster Vac Recomb Adjuvanted 50 MCG/0 5ML SUSR       Allergies   Allergen Reactions    Aloe Other (See Comments)     Fresh fruit has reaction to   She can have canned fruit    Banana - Food Allergy Other (See Comments)     Itchy throat    Naproxen Diarrhea    Oxycodone-Acetaminophen Hives       Labs and pertinent reports reviewed        This note has been generated by voice recognition software system  Therefore, there may be spelling, grammar, and or syntax errors  Please contact if questions arise

## 2021-08-28 NOTE — PROGRESS NOTES
[address] [facility]  Progress Note - Carlin Savage 10/15/1931, 80 y o  female MRN: [id number]  Unit/Bed#: [facility] 810-01 Encounter: [id number]  Primary Care Provider: Sergey Jackson MD   Date and time admitted to hospital: 8/26/2021  2:10 AM    Pulmonary embolism (HCC)  Assessment & Plan  -LLE extensive DVT with resulting RLL lobar and segmental PE  -Troponin negative, BNP mildly elevated but less than it had been previously  -Heparin gtt, will need transition to oral regimen when closer to discharge  -Patient continues to deny any shortness of breath, chest pain, lightheadedness/dizziness     Hypercalcemia  Assessment & Plan  · Corrected calcium was 13 1 on presentation  · IVF initiated- continues at 50 cc/hr  · Nephrology consulted, current rec's as below:  · Persistent hypercalcemia although improved would continue with gentle IV fluids  · Awaiting SPEP/UPEP/free light chain assay as well as PTH related peptide    Closed compression fractures of L3, L5 lumbar vertebra, initial encounter ([facility])  Assessment & Plan  · No reported trauma  · Neurologically intact  · Neurosurgery consulted  · TLSO brace ordered  · Upright XR pending- has been unable to sit upright for films  · Multimodal pain regimen  · PT/OT    Closed wedge compression fractures of T7, T8 vertebra ([facility])  Assessment & Plan  · No reported trauma  · Neurologically intact  · Neurosurgery consulted  · TLSO brace ordered  · Upright XR pending- has been unable to sit upright for films  · Multimodal pain regimen  · PT/OT        Disposition: Continue inpatient care      SUBJECTIVE:  Chief Complaint: "I feel okay"    Subjective: Patient this morning again denies any chest pain or sob  Continues on heparin gtt for DVT and PE  Right PICC line placed yesterday  Continues to be hemodynamically stable         OBJECTIVE:     Meds/Allergies     Current Facility-Administered Medications:     ALPRAZolam (XANAX) tablet 0 25 mg, 0 25 mg, Oral, BID PRN, Sivan Cargo, DO    cyclobenzaprine (FLEXERIL) tablet 10 mg, 10 mg, Oral, TID PRN, Ny Colem, DO, 10 mg at 08/26/21 0926    dicyclomine (BENTYL) capsule 20 mg, 20 mg, Oral, 4x Daily PRN, Ny Hendersonkim, DO    docusate sodium (COLACE) capsule 100 mg, 100 mg, Oral, BID, Juanita Hakim, DO, 100 mg at 08/27/21 0840    gabapentin (NEURONTIN) capsule 100 mg, 100 mg, Oral, BID, Juanita Hakim, DO, 100 mg at 08/27/21 1724    heparin (porcine) 25,000 units in 0 45% NaCl 250 mL infusion (premix), 3-30 Units/kg/hr (Order-Specific), Intravenous, Titrated, LexieSUKHWINDER FloresNP, Last Rate: 4 5 mL/hr at 08/27/21 1145, 9 Units/kg/hr at 08/27/21 1145    heparin (porcine) injection 2,000 Units, 2,000 Units, Intravenous, Q1H PRN, Lexie Van W[place], CRNP    heparin (porcine) injection 4,000 Units, 4,000 Units, Intravenous, Q1H PRN, Lexie Van Wert, CRNP    pantoprazole (PROTONIX) EC tablet 40 mg, 40 mg, Oral, Early Morning, Juanita Colem, DO, 40 mg at 08/28/21 0603    polyethylene glycol (MIRALAX) packet 17 g, 17 g, Oral, Daily, Juanita Colem, DO, 17 g at 08/27/21 0841    sodium chloride 0 9 % infusion, 50 mL/hr, Intravenous, Continuous, Yanick Fiore DO, Last Rate: 50 mL/hr at 08/27/21 1559, 50 mL/hr at 08/27/21 1559    sucralfate (CARAFATE) tablet 1 g, 1 g, Oral, 4x Daily (AC & HS), Juanita Santiagokim, DO, 1 g at 08/28/21 0603     Vitals:   Vitals:    08/28/21 0732   BP: 154/96   Pulse:    Resp: 19   Temp: 98 7 °F (37 1 °C)   SpO2:        Intake/Output:  I/O       08/26 0701 - 08/27 0700 08/27 0701 - 08/28 0700 08/28 0701 - 08/29 0700    P  O   240     I V  (mL/kg) 1000 (20)      IV Piggyback 100      Total Intake(mL/kg) 1100 (22) 240 (4 8)     Urine (mL/kg/hr)  623 (0 5)     Total Output  623     Net +1100 -383            Unmeasured Urine Occurrence  2 x     Unmeasured Stool Occurrence 1 x 4 x            Nutrition/GI Proph/Bowel Reg: Reg house   Colace, mirilax     Physical Exam:   GENERAL APPEARANCE: Frail appearing, laying on her right side  NEURO: Wakes up to voice  Oriented  Moves both upper and lowers  HEENT: Pupils equal and reactive  CV: RRR, no murmurs  LUNGS: CTAB, no distress   GI: soft, nontender, nondistended  : deferred  MSK: LLE with diffuse edema noted, wrapping in place  SKIN: Warm dry  Invasive Devices     Peripherally Inserted Central Catheter Line            PICC Line 96/68/23 Right Basilic <1 day          Peripheral Intravenous Line            Peripheral IV 08/26/21 Right;Upper Arm 1 day          Drain            External Urinary Catheter <1 day                 Lab Results: Results: I have personally reviewed pertinent reports  Imaging/EKG Studies: Results: I have personally reviewed pertinent reports        VTE Prophylaxis: Heparin gtt

## 2021-08-28 NOTE — PLAN OF CARE
Problem: OCCUPATIONAL THERAPY ADULT  Goal: Performs self-care activities at highest level of function for planned discharge setting  See evaluation for individualized goals  Description: Treatment Interventions: ADL retraining, Functional transfer training, UE strengthening/ROM, Endurance training, Cognitive reorientation, Patient/family training, Equipment evaluation/education, Fine motor coordination activities, Compensatory technique education          See flowsheet documentation for full assessment, interventions and recommendations  Outcome: Progressing  Note: Limitation: Decreased ADL status, Decreased UE ROM, Decreased UE strength, Decreased Safe judgement during ADL, Decreased cognition, Decreased endurance, Decreased fine motor control, Decreased self-care trans, Decreased high-level ADLs, Mood limitation  Prognosis: Good  Assessment: Patient is an 80year old female admitted to [facility] after she passed out at home, patient found to have closed wedge fractures of T7-T8-L3 and  L5 compression fractures, transfered to [facility] for further trauma care  Patient also found to have STEPHANIE, pulmonary embolism, bilateral DVTs and hyperkalemia  Patient with TLSO ordered for OOB activity and is activity as toleratred per trauma  Patient's family present for OT evaluation, reports patient has been "bed bound" since she returned home from [facility] rehabe approximaltely 6 weeks ago  Patient's son with signifcant concern with patient being able to return home with only 's support as  is not able to provide physical A for patient  Patient currenrly requires signficant ADL spport and A x 2 for safe mobilization OOB  OT spoke extensively with family regarding discharge recommendations based on patient's current functional status, do not feel patient can SAFELY be managed in home setting and stongly recommend post-acute rehabilitation services    Also discussed possible need for family to consider/explore more support environment for patient and  in the near future as family is not close by to provide 24 hour support  Will continue to follow for acute care OT services to progress basic ADL skills to highest level of indpendence with least amount of caregiver assistance       Recommendation: PT consult  OT Discharge Recommendation: Post acute rehabilitation services

## 2021-08-29 LAB
ALBUMIN SERPL BCP-MCNC: 2 G/DL (ref 3.5–5)
ANION GAP SERPL CALCULATED.3IONS-SCNC: 4 MMOL/L (ref 4–13)
APTT PPP: 61 SECONDS (ref 23–37)
BUN SERPL-MCNC: 18 MG/DL (ref 5–25)
CALCIUM ALBUM COR SERPL-MCNC: 10.5 MG/DL (ref 8.3–10.1)
CALCIUM SERPL-MCNC: 8.9 MG/DL (ref 8.3–10.1)
CHLORIDE SERPL-SCNC: 111 MMOL/L (ref 100–108)
CO2 SERPL-SCNC: 22 MMOL/L (ref 21–32)
CREAT SERPL-MCNC: 0.49 MG/DL (ref 0.6–1.3)
GFR SERPL CREATININE-BSD FRML MDRD: 87 ML/MIN/1.73SQ M
GLUCOSE SERPL-MCNC: 124 MG/DL (ref 65–140)
PHOSPHATE SERPL-MCNC: 1.6 MG/DL (ref 2.3–4.1)
POTASSIUM SERPL-SCNC: 3.5 MMOL/L (ref 3.5–5.3)
SODIUM SERPL-SCNC: 137 MMOL/L (ref 136–145)

## 2021-08-29 PROCEDURE — 85730 THROMBOPLASTIN TIME PARTIAL: CPT | Performed by: SURGERY

## 2021-08-29 PROCEDURE — 99232 SBSQ HOSP IP/OBS MODERATE 35: CPT | Performed by: SURGERY

## 2021-08-29 PROCEDURE — 99232 SBSQ HOSP IP/OBS MODERATE 35: CPT | Performed by: INTERNAL MEDICINE

## 2021-08-29 PROCEDURE — 80069 RENAL FUNCTION PANEL: CPT | Performed by: INTERNAL MEDICINE

## 2021-08-29 PROCEDURE — 99232 SBSQ HOSP IP/OBS MODERATE 35: CPT | Performed by: PHYSICIAN ASSISTANT

## 2021-08-29 RX ORDER — LIDOCAINE 50 MG/G
2 PATCH TOPICAL DAILY
Status: DISCONTINUED | OUTPATIENT
Start: 2021-08-29 | End: 2021-09-04 | Stop reason: HOSPADM

## 2021-08-29 RX ORDER — ACETAMINOPHEN 325 MG/1
975 TABLET ORAL EVERY 8 HOURS SCHEDULED
Status: DISCONTINUED | OUTPATIENT
Start: 2021-08-29 | End: 2021-09-04 | Stop reason: HOSPADM

## 2021-08-29 RX ADMIN — POTASSIUM PHOSPHATE, MONOBASIC AND POTASSIUM PHOSPHATE, DIBASIC 30 MMOL: 224; 236 INJECTION, SOLUTION, CONCENTRATE INTRAVENOUS at 19:27

## 2021-08-29 RX ADMIN — OXYCODONE HYDROCHLORIDE 2.5 MG: 5 TABLET ORAL at 19:52

## 2021-08-29 RX ADMIN — DOCUSATE SODIUM 100 MG: 100 CAPSULE, LIQUID FILLED ORAL at 08:25

## 2021-08-29 RX ADMIN — POLYETHYLENE GLYCOL 3350 17 G: 17 POWDER, FOR SOLUTION ORAL at 08:25

## 2021-08-29 RX ADMIN — OXYCODONE HYDROCHLORIDE 2.5 MG: 5 TABLET ORAL at 12:16

## 2021-08-29 RX ADMIN — LIDOCAINE 2 PATCH: 50 PATCH TOPICAL at 17:55

## 2021-08-29 RX ADMIN — GABAPENTIN 100 MG: 100 CAPSULE ORAL at 17:55

## 2021-08-29 RX ADMIN — GABAPENTIN 100 MG: 100 CAPSULE ORAL at 08:25

## 2021-08-29 RX ADMIN — SUCRALFATE 1 G: 1 TABLET ORAL at 17:55

## 2021-08-29 RX ADMIN — SODIUM CHLORIDE 50 ML/HR: 0.9 INJECTION, SOLUTION INTRAVENOUS at 10:15

## 2021-08-29 RX ADMIN — DOCUSATE SODIUM 100 MG: 100 CAPSULE, LIQUID FILLED ORAL at 17:55

## 2021-08-29 NOTE — PROGRESS NOTES
[facility]  Progress Note - Gladys Murdocka 10/15/1931, 80 y o  female MRN: [id number]  Unit/Bed#: [facility] 810-01 Encounter: [id number]  Primary Care Provider: Dwayne Graham MD   Date and time admitted to hospital: 8/26/2021  2:10 AM    Pulmonary embolism (HCC)  Assessment & Plan  -LLE extensive DVT with resulting RLL lobar and segmental PE  -Troponin negative, BNP mildly elevated but less than it had been previously  -Heparin gtt, will need transition to oral regimen when closer to discharge  -Patient continues to deny any shortness of breath, chest pain, lightheadedness/dizziness     Syncope  Assessment & Plan  · Family reports patient syncopized thing while in a chair  · Echo demonstrated EF of 60%  · EKG/troponin--negative  · IVF  · Telemetry    STEPHANIE (acute kidney injury) ([facility] )  Assessment & Plan  · Cr improving since initial STEPHANIE  · Hold IVF at this time  · Transition to PO diet  · Daily BMPs  · Hold nephrotoxic agents such as Lipitor  · Nephrology following    Hypercalcemia  Assessment & Plan  · Corrected calcium was 13 1 on presentation  · IVF initiated- continues at 50 cc/hr  · Nephrology consulted, current rec's as below:  · Persistent hypercalcemia although improved would continue with gentle IV fluids  · Awaiting SPEP/UPEP/free light chain assay as well as PTH related peptide  · Heme Onc consulted, plan for bone marrow biopsy    Lower leg DVT (deep venous thromboembolism), acute, left (HCC)  Assessment & Plan  · Family reports patient being largely immobile for the past 3 weeks  · Patient has decreased pulses in left lower leg    Patient is noted to have swelling in LLE  · Duplex completed and DVT was reported, now with evidence of PE  · Started on heparin gtt  · Appreciate vascular surgery recommendations  · Will consider transition to Eliquis when stable    Closed compression fractures of L3, L5 lumbar vertebra, initial encounter ([facility] )  Assessment & Plan  · No reported trauma  · Neurologically intact  · Neurosurgery consulted  · TLSO brace ordered  · Upright XR pending  · Multimodal pain regimen  · PT/OT    Closed wedge compression fractures of T7, T8 vertebra (HCC)  Assessment & Plan  · No reported trauma  · Neurologically intact  · Neurosurgery consulted  · TLSO brace ordered  · Upright XR pending  · Multimodal pain regimen  · PT/OT    Hyperlipidemia  Assessment & Plan  · Hold home Lipitor    Essential hypertension with goal blood pressure less than 140/90  Assessment & Plan  · Hold home Cozaar due to STEPHANIE  · Nephrology following, determine ability to resume  · BP appears well controlled        Disposition:  Continue care      SUBJECTIVE:  Chief Complaint:  Compression fractures    Subjective:  49-year-old female presents with compression fractures after syncope and fall  Patient was additionally found to have a DVT and PE, which is now being treated with heparin drip  She was also found to have hypercalcemia and she is being worked up by [employer] and heme Onc  Patient has no complaints today  The swelling in her left leg seems improved compared to prior        OBJECTIVE:     Meds/Allergies     Current Facility-Administered Medications:     acetaminophen (TYLENOL) tablet 650 mg, 650 mg, Oral, Q6H PRN, Brook Templeton MD    ALPRAZolam Kandee Mahan) tablet 0 25 mg, 0 25 mg, Oral, BID PRN, Sonu Rivera DO    cyclobenzaprine (FLEXERIL) tablet 10 mg, 10 mg, Oral, TID PRN, Linda Colem, DO, 10 mg at 08/26/21 8298    dicyclomine (BENTYL) capsule 20 mg, 20 mg, Oral, 4x Daily PRN, Linda Santosa Santiagokim, DO    docusate sodium (COLACE) capsule 100 mg, 100 mg, Oral, BID, Juanita Hakim, DO, 100 mg at 08/29/21 0825    gabapentin (NEURONTIN) capsule 100 mg, 100 mg, Oral, BID, Juanita Hakim, DO, 100 mg at 08/29/21 0825    heparin (porcine) 25,000 units in 0 45% NaCl 250 mL infusion (premix), 3-30 Units/kg/hr (Order-Specific), Intravenous, Titrated, Jamie Clamp, CRNP, Last Rate: 4 5 mL/hr at 08/28/21 1301, 9 Units/kg/hr at 08/28/21 1301    heparin (porcine) injection 2,000 Units, 2,000 Units, Intravenous, Q1H PRN, EUNICE Sewell    heparin (porcine) injection 4,000 Units, 4,000 Units, Intravenous, Q1H PRN, EUNICE Sewell    ondansetron [facility]) injection 4 mg, 4 mg, Intravenous, Q6H PRN, Haider Lim MD    oxyCODONE (ROXICODONE) IR tablet 2 5 mg, 2 5 mg, Oral, Q4H PRN, Genoaclinton Templeton MD, 2 5 mg at 08/29/21 1216    pantoprazole (PROTONIX) EC tablet 40 mg, 40 mg, Oral, Early Morning, Juanita Nina DO, 40 mg at 08/28/21 0603    polyethylene glycol (MIRALAX) packet 17 g, 17 g, Oral, Daily, Juanita Nina DO, 17 g at 08/29/21 0825    sodium chloride 0 9 % infusion, 50 mL/hr, Intravenous, Continuous, Rosalynn Lanes Dunn, DO, Last Rate: 50 mL/hr at 08/29/21 1015, 50 mL/hr at 08/29/21 1015    sucralfate (CARAFATE) tablet 1 g, 1 g, Oral, 4x Daily (AC & HS), Juanita Nina DO, 1 g at 08/28/21 1048     Vitals:   Vitals:    08/29/21 1124   BP: 144/92   Pulse:    Resp: 18   Temp: 97 7 °F (36 5 °C)   SpO2:        Intake/Output:  I/O       08/27 0701 - 08/28 0700 08/28 0701 - 08/29 0700 08/29 0701 - 08/30 0700    P  O  240 0     I V  (mL/kg)  1000 (20) 1057 5 (21 2)    IV Piggyback       Total Intake(mL/kg) 240 (4 8) 1000 (20) 1057 5 (21 2)    Urine (mL/kg/hr) 623 (0 5)  400 (1 4)    Total Output 623  400    Net -383 +1000 +657 5           Unmeasured Urine Occurrence 2 x 1 x     Unmeasured Stool Occurrence 4 x             Nutrition/GI Proph/Bowel Reg:  Regular diet/Protonix/Colace    Physical Exam:   GENERAL APPEARANCE:  Not in acute distress  NEURO:  Confused, nonfocal  HEENT:  Normocephalic and atraumatic  CV:  Regular rate and rhythm  LUNGS:  Clear to auscultation bilaterally  GI:  Soft and nontender  :  Deferred  MSK:  Moving all extremities, Ace wrap around left lower extremity, 1+ edema on left lower extremity  SKIN:  Warm and dry, no mottling    Invasive Devices     Peripherally Inserted Central Catheter Line            PICC Line 22/58/87 Right Basilic 1 day          Peripheral Intravenous Line            Peripheral IV 08/26/21 Right;Upper Arm 2 days          Drain            External Urinary Catheter 1 day                 Lab Results: Results: I have personally reviewed pertinent reports  Imaging/EKG Studies: Results: I have personally reviewed pertinent reports      Other Studies:  None  VTE Prophylaxis: Heparin

## 2021-08-29 NOTE — CONSULTS
Consultation - Palliative and Supportive Care   Alfa Monroe 80 y o  female [id number]    Patient Active Problem List   Diagnosis    Chronic kidney disease, stage 3 unspecified ([place] [id number] )    Current mild episode of major depressive disorder without prior episode ([place] [address] )    Essential hypertension with goal blood pressure less than 140/90    Hyperlipidemia    Closed wedge compression [facility] of T7, T8 vertebra [facility]    Closed compression fractures of L3, L5 lumbar vertebra, initial encounter ([place] [address] )    Lower leg DVT (deep venous thromboembolism), acute, left ([facility])    Hypercalcemia    STEPHANIE (acute kidney injury) ([place] [address] )    Syncope    Pulmonary embolism ([place] [address] )     Active issues specifically addressed today include:   CKD3  T7, T8, L3, and L5 compression fractures  Fracture associated pain  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  anxiety  Goals of care discussion  Palliative care encounter    Plan:  1  Symptom management -    - Acetaminophen 975mg PO Q8H [address]   - oxyIR 2 5mg PO Q4H PRN moderate-severe pain   - flexeril 10mg TID PRN spasms --> d/c   - lidocaine patches to back   - gabapentin 100mg BID   - miralax and colace QD   - xanax 0 25mg BID PRN    2  Goals - level 3 DNR   - Ongoing disease directed cares with previously set limit of DNR/DNI   - Patient is agreeable to bone marrow biopsy if indicated  Family will be present tomorrow afternoon to discuss further with palliative team  Continue medical optimization in the interim  Code Status: CMS4 - Level 3   Decisional apparatus:  Patient is not competent on my exam today  If competence is lost, patient's substitute decision maker would default to spouse by [place]  Advance Directive / Living Will / POLST:  None on file    3   Social support   - Patient is well supported by her spouse and 5 children   - Pastoral care consulted for spiritual support     I have reviewed the patient's controlled substance dispensing history in the Prescription Drug Monitoring Program in compliance with the [facility] regulations before prescribing any controlled substances  We appreciate the invitation to be involved in this patient's care  We will continue to follow  Please do not hesitate to reach our on call provider through our clinic answering service at  should you have acute symptom control concerns  Jemma Lynn PA-C  Palliative and Supportive Care  Clinic/Answering Service: [phone]  You can find me on [employer]! IDENTIFICATION:  Inpatient consult to Palliative Care  Consult performed by: Jemma Lynn PA-C  Consult ordered by: Mercedes Rodriguez MD        Physician Requesting Consult: Solitario Smith DO  Reason for Consult / Principal Problem: symptom managemnet  Hx and PE limited by: poor historian, mild confusion  Supplemented via chart review and communication with primary team     HISTORY OF PRESENT ILLNESS:       John Knight is a 80 y o  female with CKD3, bed bound at baseline, IgG monoclonal gammopathy with concern of MM, presented to [facility]/26 as a trauma transfer after syncope  Patient was identified to have T7, T8, L3, and L5 compression fractures  On admission patient was found to be hypercalcemic and encephalopathic  Geriatrics was consulted for optimization and also recommended more aggressive bowel regimen for concern of constipation  Neurosurgery was consulted and recommended TLSO brace  Nephrology was consulted for hypercalcemia, and vascular for LLE DVT and subsegmental PE  No interventions indicated at this time  Oncology was consulted due to concern for multiple myeloma  IR consulted for bone marrow bx  Palliative care has been consulted for support and symptom management in setting of progressive clinical decline  Prior to admission patient was reportedly bedbound  However, fully oriented per family    reports that patient transfers to chair with assistance while sheets are changed  It was then that patient syncopized  Patient was previously residing at home with her spouse, Yaneli Gutierrez, who is her primary caregiver  Back pain has been limiting ambulation over the past month requiring full assistance with ADLs and IADLs  During time of visit patient reports mild-moderate back pain, but otherwise denies any complaints  She is grateful for palliative support  Review of Systems   Constitutional: Negative for decreased appetite  Cardiovascular: Positive for syncope (denies recollection of this event prior to admission)  Respiratory: Negative for shortness of breath  Musculoskeletal: Positive for back pain  Gastrointestinal: Negative for constipation, nausea and vomiting  Neurological: Positive for weakness  Psychiatric/Behavioral: The patient does not have insomnia          Past Medical History:   Diagnosis Date    Chronic pain     Diverticulitis     GERD (gastroesophageal reflux disease)     Hypertension     Shingles     Shortness of breath      Past Surgical History:   Procedure Laterality Date    APPENDECTOMY      CHOLECYSTECTOMY      INCISION AND DRAINAGE RETROPHYARYNGEAL ABCESS      JOINT REPLACEMENT      PELVIC FRACTURE SURGERY      TOTAL HIP ARTHROPLASTY       Social History     Socioeconomic History    Marital status: /Civil Union     Spouse name: Not on file    Number of children: Not on file    Years of education: Not on file    Highest education level: Not on file   Occupational History    Not on file   Tobacco Use    Smoking status: Never Smoker    Smokeless tobacco: Never Used   Vaping Use    Vaping Use: Never used   Substance and Sexual Activity    Alcohol use: Never    Drug use: Never    Sexual activity: Not Currently   Other Topics Concern    Not on file   Social History Narrative    Not on file     Social Determinants of Health     Financial Resource Strain:     Difficulty of Paying Living Expenses:    Food Insecurity:     Worried About [facility] in the Last Year:    [address] in the Last Year:    Transportation Needs:     Lack of Transportation (Medical):  Lack of Transportation (Non-Medical):    Physical Activity:     Days of Exercise per Week:     Minutes of Exercise per Session:    Stress:     Feeling of Stress :    Social Connections:     Frequency of Communication with Friends and Family:     Frequency of Social Gatherings with Friends and Family:     Attends Jew Services:     Active Member of Clubs or Organizations:     Attends Club or Organization Meetings:     Marital Status:    Intimate Partner Violence:     Fear of Current or Ex-Partner:     Emotionally Abused:     Physically Abused:     Sexually Abused:      Family History   Problem Relation Age of Onset    Stroke Mother        MEDICATIONS / ALLERGIES:    all current active meds have been reviewed    Allergies   Allergen Reactions    Aloe Other (See Comments)     Fresh fruit has reaction to   She can have canned fruit    Banana - Food Allergy Other (See Comments)     Itchy throat    Naproxen Diarrhea    Oxycodone-Acetaminophen Hives       OBJECTIVE:    Physical Exam  Physical Exam  Constitutional:       General: She is not in acute distress  Appearance: She is not ill-appearing  HENT:      Head: Normocephalic and atraumatic  Eyes:      Conjunctiva/sclera: Conjunctivae normal    Cardiovascular:      Rate and Rhythm: Normal rate  Pulmonary:      Effort: Pulmonary effort is normal  No respiratory distress  Abdominal:      General: There is no distension  Tenderness: There is no guarding  Musculoskeletal:         General: No swelling  Skin:     General: Skin is warm and dry  Neurological:      Mental Status: She is alert        Comments: Oriented to self and hospital but not situation   Psychiatric:      Comments: Pleasant mood, no agitation or restlessness         Lab Results:   I have personally reviewed pertinent labs  , CBC:   Lab Results   Component Value Date    WBC 10 52 (H) 08/28/2021    HGB 11 2 (L) 08/28/2021    HCT 33 9 (L) 08/28/2021    MCV 97 08/28/2021     08/28/2021    MCH 32 2 08/28/2021    MCHC 33 0 08/28/2021    RDW 14 2 08/28/2021    MPV 11 3 08/28/2021    NRBC 0 08/28/2021   , CMP:   Lab Results   Component Value Date    SODIUM 136 08/28/2021    K 3 0 (L) 08/28/2021     08/28/2021    CO2 21 08/28/2021    BUN 24 08/28/2021    CREATININE 0 56 (L) 08/28/2021    CALCIUM 10 3 (H) 08/28/2021    AST 19 08/28/2021    ALT 12 08/28/2021    ALKPHOS 181 (H) 08/28/2021    EGFR 83 08/28/2021     Imaging Studies: reviewed pertinent studies  EKG, Pathology, and Other Studies: reviewed pertinent studies    Counseling / Coordination of Care    Total floor / unit time spent today 35+ minutes  Greater than 50% of total time was spent with the patient and / or family counseling and / or coordination of care   A description of the counseling / coordination of care: time spent assessing patient, discussing symptom management, providing support, communication with RN and primary team

## 2021-08-29 NOTE — PROGRESS NOTES
NEPHROLOGY PROGRESS NOTE   Gardiner Babinski 80 y o  female MRN: [id number]  Unit/Bed#: [facility] 810-01 Encounter: [id number]  Reason for Consult:  Acute kidney injury    ASSESSMENT/PLAN:  1  Acute kidney injury most likely secondary to prerenal azotemia given volume depletion as well as hypercalcemia overall has significantly improved  2  Hypercalcemia, likely related to suspected myeloma although may represent component of hyperparathyroidism, given inappropriately suppressed PTH     · Monoclonal gammopathy, IgG kappa,  · Free light chain assay 6 14  · Anticipating bone marrow biopsy  · Status post Zometa 3 mg on 08/28  3  Monoclonal gammopathy, IgG kappa, free light chain assay 6 14  4  T7/T8/L3/L5 compression fractures  5  PE/DVT currently on heparin infusion   6  Hypophosphatemia/hypokalemia will continue to monitor , status post 30 millimoles potassium phosphorus       PLAN:  · Await repeat laboratory studies  · Status post Zometa  · Continue monitor potassium phosphorus levels  · Anticipating bone marrow biopsy    SUBJECTIVE:  Seen and examined  Patient resting comfortably  Does not appear short of breath  No acute distress noted  Review of Systems    OBJECTIVE:  Current Weight: Weight - Scale: 49 9 kg (110 lb 0 2 oz)  Vitals:    08/28/21 2332 08/29/21 0000 08/29/21 0728 08/29/21 1124   BP: (!) 156/120 150/90 142/94 144/92   BP Location:  Left arm     Pulse:  80     Resp: 19  19 18   Temp: 97 6 °F (36 4 °C)  98 °F (36 7 °C) 97 7 °F (36 5 °C)   TempSrc:       SpO2:       Weight:       Height:           Intake/Output Summary (Last 24 hours) at 8/29/2021 1159  Last data filed at 8/29/2021 1012  Gross per 24 hour   Intake 2057 5 ml   Output 400 ml   Net 1657 5 ml       Physical Exam  Constitutional:       General: She is sleeping  HENT:      Head: Normocephalic and atraumatic  Cardiovascular:      Rate and Rhythm: Normal rate and regular rhythm     Pulmonary:      Effort: Pulmonary effort is normal  Breath sounds: Normal breath sounds  Abdominal:      General: There is no distension  Palpations: Abdomen is soft  Musculoskeletal:      Right lower leg: No edema  Left lower leg: No edema  Skin:     General: Skin is warm and dry  Findings: No rash  Neurological:      Mental Status: She is easily aroused           Medications:    Current Facility-Administered Medications:     acetaminophen (TYLENOL) tablet 650 mg, 650 mg, Oral, Q6H PRN, Winifred Templeton MD    ALPRAZolam Jade Bump) tablet 0 25 mg, 0 25 mg, Oral, BID PRN, Morrow Prophet, DO    cyclobenzaprine (FLEXERIL) tablet 10 mg, 10 mg, Oral, TID PRN, Dionna Rodriguez Hakim, DO, 10 mg at 08/26/21 5706    dicyclomine (BENTYL) capsule 20 mg, 20 mg, Oral, 4x Daily PRN, Dionna Bricenodle Hakim, DO    docusate sodium (COLACE) capsule 100 mg, 100 mg, Oral, BID, Juanita Hakim, DO, 100 mg at 08/29/21 0825    gabapentin (NEURONTIN) capsule 100 mg, 100 mg, Oral, BID, Juanita Hakim, DO, 100 mg at 08/29/21 0825    heparin (porcine) 25,000 units in 0 45% NaCl 250 mL infusion (premix), 3-30 Units/kg/hr (Order-Specific), Intravenous, Titrated, Vista Lime, CRNP, Last Rate: 4 5 mL/hr at 08/28/21 1301, 9 Units/kg/hr at 08/28/21 1301    heparin (porcine) injection 2,000 Units, 2,000 Units, Intravenous, Q1H PRN, Vista Lime, CRNP    heparin (porcine) injection 4,000 Units, 4,000 Units, Intravenous, Q1H PRN, Vista Lime, CRNP    ondansetron [facility]) injection 4 mg, 4 mg, Intravenous, Q6H PRN, Winifred Templeton MD    oxyCODONE (ROXICODONE) IR tablet 2 5 mg, 2 5 mg, Oral, Q4H PRN, Winifred Templeton MD, 2 5 mg at 08/28/21 1151    pantoprazole (PROTONIX) EC tablet 40 mg, 40 mg, Oral, Early Morning, Juanita Nina DO, 40 mg at 08/28/21 0603    polyethylene glycol (MIRALAX) packet 17 g, 17 g, Oral, Daily, Juanita Nina DO, 17 g at 08/29/21 0825    sodium chloride 0 9 % infusion, 50 mL/hr, Intravenous, Continuous, Carley Fiore DO, Last Rate: 50 mL/hr at 08/29/21 1015, 50 mL/hr at 08/29/21 1015    sucralfate (CARAFATE) tablet 1 g, 1 g, Oral, 4x Daily (AC & HS), Juanita Nina DO, 1 g at 08/28/21 1048    Laboratory Results:  Results from last 7 days   Lab Units 08/28/21  1046 08/27/21  1515 08/27/21  0255 08/26/21  1206 08/26/21  0438 08/25/21  1954   WBC Thousand/uL 10 52* 12 09*  --  13 56* 14 59* 17 59*   HEMOGLOBIN g/dL 11 2* 11 3*  --  13 0 13 5 13 6   HEMATOCRIT % 33 9* 34 2*  --  40 2 41 3 42 0   PLATELETS Thousands/uL 198 187  --  172 197 202   POTASSIUM mmol/L 3 0* 3 3* 3 6  --  3 7 4 3   CHLORIDE mmol/L 107 105 105  --  105 102   CO2 mmol/L 21 24 26  --  25 26   BUN mg/dL 24 30* 30*  --  35* 33*   CREATININE mg/dL 0 56* 0 74 0 87  --  1 53* 2 24*   CALCIUM mg/dL 10 3* 10 6* 10 6* 10 9* 11 3* 11 9*   MAGNESIUM mg/dL  --  1 9  --   --   --   --    PHOSPHORUS mg/dL 1 3* 1 8*  --   --   --   --

## 2021-08-29 NOTE — ASSESSMENT & PLAN NOTE
· Corrected calcium was 13 1 on presentation  · IVF initiated- continues at 50 cc/hr  · Nephrology consulted, current rec's as below:  · Persistent hypercalcemia although improved would continue with gentle IV fluids  · Awaiting SPEP/UPEP/free light chain assay as well as PTH related peptide  · Heme Onc consulted, plan for bone marrow biopsy

## 2021-08-30 ENCOUNTER — TELEPHONE (OUTPATIENT)
Dept: NEUROSURGERY | Facility: CLINIC | Age: 86
End: 2021-08-30

## 2021-08-30 LAB
ANION GAP SERPL CALCULATED.3IONS-SCNC: 4 MMOL/L (ref 4–13)
APTT PPP: 66 SECONDS (ref 23–37)
BUN SERPL-MCNC: 24 MG/DL (ref 5–25)
CALCIUM SERPL-MCNC: 8.7 MG/DL (ref 8.3–10.1)
CHLORIDE SERPL-SCNC: 110 MMOL/L (ref 100–108)
CO2 SERPL-SCNC: 23 MMOL/L (ref 21–32)
CREAT SERPL-MCNC: 0.58 MG/DL (ref 0.6–1.3)
GFR SERPL CREATININE-BSD FRML MDRD: 82 ML/MIN/1.73SQ M
GLUCOSE SERPL-MCNC: 82 MG/DL (ref 65–140)
PHOSPHATE SERPL-MCNC: 2.3 MG/DL (ref 2.3–4.1)
POTASSIUM SERPL-SCNC: 3.9 MMOL/L (ref 3.5–5.3)
SODIUM SERPL-SCNC: 137 MMOL/L (ref 136–145)

## 2021-08-30 PROCEDURE — 99232 SBSQ HOSP IP/OBS MODERATE 35: CPT | Performed by: PHYSICIAN ASSISTANT

## 2021-08-30 PROCEDURE — 99232 SBSQ HOSP IP/OBS MODERATE 35: CPT | Performed by: SURGERY

## 2021-08-30 PROCEDURE — 99232 SBSQ HOSP IP/OBS MODERATE 35: CPT | Performed by: INTERNAL MEDICINE

## 2021-08-30 PROCEDURE — 80048 BASIC METABOLIC PNL TOTAL CA: CPT | Performed by: INTERNAL MEDICINE

## 2021-08-30 PROCEDURE — NC001 PR NO CHARGE: Performed by: NURSE PRACTITIONER

## 2021-08-30 PROCEDURE — 84100 ASSAY OF PHOSPHORUS: CPT | Performed by: INTERNAL MEDICINE

## 2021-08-30 PROCEDURE — 85730 THROMBOPLASTIN TIME PARTIAL: CPT | Performed by: SURGERY

## 2021-08-30 RX ORDER — SODIUM CHLORIDE, SODIUM GLUCONATE, SODIUM ACETATE, POTASSIUM CHLORIDE, MAGNESIUM CHLORIDE, SODIUM PHOSPHATE, DIBASIC, AND POTASSIUM PHOSPHATE .53; .5; .37; .037; .03; .012; .00082 G/100ML; G/100ML; G/100ML; G/100ML; G/100ML; G/100ML; G/100ML
50 INJECTION, SOLUTION INTRAVENOUS CONTINUOUS
Status: DISCONTINUED | OUTPATIENT
Start: 2021-08-30 | End: 2021-09-01

## 2021-08-30 RX ADMIN — GABAPENTIN 100 MG: 100 CAPSULE ORAL at 08:52

## 2021-08-30 RX ADMIN — ACETAMINOPHEN 975 MG: 325 TABLET, FILM COATED ORAL at 21:16

## 2021-08-30 RX ADMIN — LIDOCAINE 2 PATCH: 50 PATCH TOPICAL at 08:52

## 2021-08-30 RX ADMIN — HEPARIN SODIUM 9 UNITS/KG/HR: 10000 INJECTION, SOLUTION INTRAVENOUS at 18:37

## 2021-08-30 RX ADMIN — DOCUSATE SODIUM 100 MG: 100 CAPSULE, LIQUID FILLED ORAL at 08:51

## 2021-08-30 RX ADMIN — DOCUSATE SODIUM 100 MG: 100 CAPSULE, LIQUID FILLED ORAL at 17:30

## 2021-08-30 RX ADMIN — PANTOPRAZOLE SODIUM 40 MG: 40 TABLET, DELAYED RELEASE ORAL at 05:56

## 2021-08-30 RX ADMIN — ACETAMINOPHEN 975 MG: 325 TABLET, FILM COATED ORAL at 05:56

## 2021-08-30 RX ADMIN — SUCRALFATE 1 G: 1 TABLET ORAL at 11:58

## 2021-08-30 RX ADMIN — SUCRALFATE 1 G: 1 TABLET ORAL at 15:31

## 2021-08-30 RX ADMIN — GABAPENTIN 100 MG: 100 CAPSULE ORAL at 17:30

## 2021-08-30 RX ADMIN — SODIUM CHLORIDE, SODIUM GLUCONATE, SODIUM ACETATE, POTASSIUM CHLORIDE, MAGNESIUM CHLORIDE, SODIUM PHOSPHATE, DIBASIC, AND POTASSIUM PHOSPHATE 50 ML/HR: .53; .5; .37; .037; .03; .012; .00082 INJECTION, SOLUTION INTRAVENOUS at 21:08

## 2021-08-30 RX ADMIN — POLYETHYLENE GLYCOL 3350 17 G: 17 POWDER, FOR SOLUTION ORAL at 08:52

## 2021-08-30 RX ADMIN — SUCRALFATE 1 G: 1 TABLET ORAL at 21:16

## 2021-08-30 RX ADMIN — ACETAMINOPHEN 975 MG: 325 TABLET, FILM COATED ORAL at 15:31

## 2021-08-30 NOTE — ASSESSMENT & PLAN NOTE
· Corrected calcium was 13 1 on presentation  · IVF initiated- continues at 50 cc/hr  · Nephrology consulted, current rec's as below:  · Persistent hypercalcemia although improved would continue with gentle IV fluids  · Awaiting SPEP/UPEP/free light chain assay as well as PTH related peptide  · Concern for possible multiple myeloma  · Heme Onc consulted, pending bone marrow biopsy via IR

## 2021-08-30 NOTE — ASSESSMENT & PLAN NOTE
· Family reports patient being largely immobile for the past 3 weeks  · Patient has decreased pulses in left lower leg    Patient is noted to have swelling in LLE  · Duplex completed and DVT was reported, now with evidence of PE  · Continue heparin gtt  · Appreciate vascular surgery recommendations  · Will consider transition to Eliquis when stable

## 2021-08-30 NOTE — PROGRESS NOTES
made pastoral visit, gave communion and blessing         08/30/21 1500   Clinical Encounter Type   Visited With Patient   Routine Visit Follow-up   Continue Visiting Yes   Moravian Encounters   Moravian Needs Prayer   Sacramental Encounters   Communion Given Indicator Yes

## 2021-08-30 NOTE — QUICK NOTE
IR consult for BMBx placed  Tomasa Robertson form is in the patient's chart       BLAKE CarbajalP-BC  Hematology/Oncology Nurse Practitioner

## 2021-08-30 NOTE — PROGRESS NOTES
Progress note - Palliative and Supportive Care   Vitor Trujillo 80 y o  female [id number]    Patient Active Problem List   Diagnosis    Chronic kidney disease, stage 3 unspecified ([place] [address] )    Current mild episode of major depressive disorder without prior episode ([place] [address] )    Essential hypertension with goal blood pressure less than 140/90    Hyperlipidemia    Closed wedge compression fractures of T7, T8 vertebra ([facility])    Closed compression fractures of L3, L5 lumbar vertebra, initial encounter ([place] [address] )    Lower leg DVT (deep venous thromboembolism), acute, left ([facility])    Hypercalcemia    STEPHANIE (acute kidney injury) ([place] [address] )    Syncope    Pulmonary embolism ([place] [address] )     Active issues specifically addressed today include:   CKD3  T7, T8, L3, and L5 compression fractures  Fracture associated pain  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  Anxiety  Goals of care discussion  Palliative care encounter    Plan:  1  Symptom management -    - acetaminophen 975mg PO Q8H [address]   - oxyIR 2 5mg PO Q4H PRN moderate-severe pain   - lidocaine patches   - gabapentin 100mg BID   - miralax and colace QD   - xanax 0 25mg BID PRN (home medication)   - recommend against restarting tramadol which she was taking prior to admission    2  Goals - level 3 DNR   - Met with patient along with her spouse and son-in-law at bedside today   - Ongoing disease directed cares with limits of DNR/DNI  Ultimate goals are to improve function, pain control, and balance quality and quantity of life  Patient and her spouse are aware that she will have skilled needs for the foreseeable future  - Pending bone marrow biopsy for work-up of multiple myeloma   - code status confirmed   - Palliative care will follow for repeat goals of care discussions as needed  Code Status: DNR- level 3   Decisional apparatus:  Patient is not competent on my exam today    If competence is lost, patient's substitute decision maker would default to spouse by [place]  Advance Directive / Living Will / POLST:  None on file    3  Social support   - Time spent providing introduction to palliative to patient's family today  - pastoral care following    Interval history:       No events overnight  Patient reports her pain is manageable on current regimen  She otherwise denies complaints  Remainder of conversation as above  MEDICATIONS / ALLERGIES:     all current active meds have been reviewed    Allergies   Allergen Reactions    Aloe Other (See Comments)     Fresh fruit has reaction to   She can have canned fruit    Banana - Food Allergy Other (See Comments)     Itchy throat    Naproxen Diarrhea    Oxycodone-Acetaminophen Hives       OBJECTIVE:    Physical Exam  Physical Exam  HENT:      Head: Normocephalic and atraumatic  Eyes:      Conjunctiva/sclera: Conjunctivae normal    Cardiovascular:      Rate and Rhythm: Normal rate  Pulmonary:      Effort: Pulmonary effort is normal  No respiratory distress  Abdominal:      General: There is no distension  Tenderness: There is no guarding  Musculoskeletal:         General: No swelling  Lymphadenopathy:      Cervical: No cervical adenopathy  Skin:     General: Skin is warm and dry  Neurological:      General: No focal deficit present  Mental Status: She is alert and oriented to person, place, and time  Mental status is at baseline  Psychiatric:         Mood and Affect: Mood normal          Behavior: Behavior normal          Thought Content: Thought content normal          Judgment: Judgment normal          Lab Results:   I have personally reviewed pertinent labs  , CBC: No results found for: WBC, HGB, HCT, MCV, PLT, ADJUSTEDWBC, MCH, MCHC, RDW, MPV, NRBC, CMP:   Lab Results   Component Value Date    SODIUM 137 08/30/2021    K 3 9 08/30/2021     (H) 08/30/2021    CO2 23 08/30/2021    BUN 24 08/30/2021    CREATININE 0 58 (L) 08/30/2021    CALCIUM 8 7 08/30/2021    EGFR 82 08/30/2021     Imaging Studies: reviewed pertinent studies  EKG, Pathology, and Other Studies: reviewed pertinent studies    Counseling / Coordination of Care    Total floor / unit time spent today 45+ minutes  Greater than 50% of total time was spent with the patient and / or family counseling and / or coordination of care   A description of the counseling / coordination of care: time spent assessing patient, discussing symptom management,

## 2021-08-30 NOTE — TELEPHONE ENCOUNTER
09/07/2021-CALLED [facility] AT PHONE#[phone], SPOKE TO RAMON GUNDERSON 09/13/2021 APT AT [facility] OFFICE AND TO HAVE XRAY COMPLETED 5 DAYS PRIOR TO APT  XRAY SCRIPT WAS FAXED TO FACILITY TO FAX#[phone]     09/03/2021-PT STILL IN HOSPITAL    09/02/2021-PT STILL IN HOSPITAL    09/01/2021-PT STILL IN HOSPITAL    08/31/2021-PT STILL IN HOSPITAL    08/30/2021-PT STILL IN HOSPITAL  09/13/2021 APT W/REMBERTO ESPINOSA IN [facility] SPINE XRAY      ----- Message from Hiren Mckeon PA-C sent at 8/28/2021  2:07 PM EDT -----  Regarding: hospital follow up  Please schedule patient for 2 week hospital follow up with xray prior  Thanks!

## 2021-08-30 NOTE — PROGRESS NOTES
Akiko James visited Pt after receiving referral from unit  regarding consult       08/30/21 1000   Clinical Encounter Type   Visited With Patient   Routine Visit Follow-up   Consult Patient care   Denominational Encounters   Denominational Needs Prayer

## 2021-08-30 NOTE — PROGRESS NOTES
[address] [facility]  Progress Note - Karan Hoyt 10/15/1931, 80 y o  female MRN: [id number]  Unit/Bed#: [facility] 810-01 Encounter: [id number]  Primary Care Provider: Reyes Burr, MD   Date and time admitted to hospital: 8/26/2021  2:10 AM    Pulmonary embolism (HCC)  Assessment & Plan  -LLE extensive DVT with resulting RLL lobar and segmental PE  -Troponin negative, BNP mildly elevated but less than it had been previously  -Heparin gtt, will need transition to oral regimen when closer to discharge  -Patient continues to deny any shortness of breath, chest pain, lightheadedness/dizziness     Syncope  Assessment & Plan  · Family reports patient syncopized thing while in a chair  · Echo demonstrated EF of 60%  · EKG/troponin--negative  · IVF  · Telemetry    STEPHANIE (acute kidney injury) ([facility] )  Assessment & Plan  · Cr improving since initial STEPHANIE  · Hold IVF at this time  · Transition to PO diet  · Daily BMPs  · Hold nephrotoxic agents such as Lipitor  · Nephrology following    Hypercalcemia  Assessment & Plan  · Corrected calcium was 13 1 on presentation  · IVF initiated- continues at 50 cc/hr  · Nephrology consulted, current rec's as below:  · Persistent hypercalcemia although improved would continue with gentle IV fluids  · Awaiting SPEP/UPEP/free light chain assay as well as PTH related peptide  · Concern for possible multiple myeloma  · Heme Onc consulted, pending bone marrow biopsy via IR    Lower leg DVT (deep venous thromboembolism), acute, left (HCC)  Assessment & Plan  · Family reports patient being largely immobile for the past 3 weeks  · Patient has decreased pulses in left lower leg    Patient is noted to have swelling in LLE  · Duplex completed and DVT was reported, now with evidence of PE  · Continue heparin gtt  · Appreciate vascular surgery recommendations  · Will consider transition to Eliquis when stable    Closed compression fractures of L3, L5 lumbar vertebra, initial encounter [facility])  Assessment & Plan  · No reported trauma  · Neurologically intact  · Neurosurgery consulted  · TLSO brace ordered  · Upright XR pending  · Multimodal pain regimen  · PT/OT    Closed wedge compression fractures of T7, T8 vertebra (HCC)  Assessment & Plan  · No reported trauma  · Neurologically intact  · Neurosurgery consulted  · TLSO brace ordered  · Upright XR pending  · Multimodal pain regimen  · PT/OT    Hyperlipidemia  Assessment & Plan  · Hold home Lipitor    Essential hypertension with goal blood pressure less than 140/90  Assessment & Plan  · Hold home Cozaar due to STEPHANIE  · Nephrology following, determine ability to resume  · BP appears well controlled        Disposition:  Continue inpatient care      SUBJECTIVE:  Chief Complaint:  Mild back pain, pathological fractures    Subjective:  A 5year-old female with compression fractures of the T7, T8, L3 and L5  Bed-bound at baseline, patient recalls syncopal episode but no specific fall or trauma  Hypercalcemia noted on initial evaluation  Concern for possible multiple myeloma  Patient also has bilateral PEs and DVTs, on heparin drip  Reports mild back pain intermittently, but otherwise has no acute symptoms or complaints        OBJECTIVE:     Meds/Allergies     Current Facility-Administered Medications:     acetaminophen (TYLENOL) tablet 975 mg, 975 mg, Oral, Q8H [address], Chelsie Edouard PA-C, 975 mg at 08/30/21 1531    ALPRAZolam (XANAX) tablet 0 25 mg, 0 25 mg, Oral, BID PRN, Fatoumata Art, DO    dicyclomine (BENTYL) capsule 20 mg, 20 mg, Oral, 4x Daily PRN, [facility] Hakim, DO    docusate sodium (COLACE) capsule 100 mg, 100 mg, Oral, BID, Juanita Hakim, DO, 100 mg at 08/30/21 1730    gabapentin (NEURONTIN) capsule 100 mg, 100 mg, Oral, BID, Juanita Hakim, DO, 100 mg at 08/30/21 1730    heparin (porcine) 25,000 units in 0 45% NaCl 250 mL infusion (premix), 3-30 Units/kg/hr (Order-Specific), Intravenous, Titrated, Moises Dollar, CRNP, Last Rate: 4 5 mL/hr at 08/30/21 1837, 9 Units/kg/hr at 08/30/21 1837    heparin (porcine) injection 2,000 Units, 2,000 Units, Intravenous, Q1H PRN, EUNICE Vega    heparin (porcine) injection 4,000 Units, 4,000 Units, Intravenous, Q1H PRN, EUNICE Vega    lidocaine (LIDODERM) 5 % patch 2 patch, 2 patch, Topical, Daily, Tanja Masterson PA-C, 2 patch at 08/30/21 0852    multi-electrolyte (PLASMALYTE-A/ISOLYTE-S PH 7 4) IV solution, 50 mL/hr, Intravenous, Continuous, Sara Hendricks MD    ondansetron (ZOFRAN) injection 4 mg, 4 mg, Intravenous, Q6H PRN, Idania Armstrong MD    oxyCODONE (ROXICODONE) IR tablet 2 5 mg, 2 5 mg, Oral, Q4H PRN, Deirdre Templeton MD, 2 5 mg at 08/29/21 1952    pantoprazole (PROTONIX) EC tablet 40 mg, 40 mg, Oral, Early Morning, Juanita Nina DO, 40 mg at 08/30/21 0556    polyethylene glycol (MIRALAX) packet 17 g, 17 g, Oral, Daily, Juanita Nina DO, 17 g at 08/30/21 0852    sucralfate (CARAFATE) tablet 1 g, 1 g, Oral, 4x Daily (AC & HS), Juanita Nina DO, 1 g at 08/30/21 1531     Vitals:   Vitals:    08/30/21 1550   BP: 112/67   Pulse: 91   Resp: 18   Temp: 98 8 °F (37 1 °C)   SpO2: 98%       Intake/Output:  I/O       08/28 0701 - 08/29 0700 08/29 0701 - 08/30 0700 08/30 0701 - 08/31 0700    P  O  0      I V  (mL/kg) 1000 (20) 1057 5 (21 2)     Total Intake(mL/kg) 1000 (20) 1057 5 (21 2)     Urine (mL/kg/hr)  1050 (0 9)     Total Output  1050     Net +1000 +7 5            Unmeasured Urine Occurrence 1 x 2 x            Nutrition/GI Proph/Bowel Reg:  Regular diet, Protonix, Colace    Physical Exam:   GENERAL APPEARANCE:  No acute distress  NEURO:  Awake, alert oriented x3, no focal deficits  HEENT:  Normal cephalic and atraumatic  CV:  Regular rate and rhythm  LUNGS:  Clear to auscultation bilaterally  GI:  Soft and nontender abdomen  :  Deferred  MSK:  Moving all extremities, 1+ left lower extremity edema  SKIN:  Warm and dry    Invasive Devices     Peripherally Inserted Central Catheter Line PICC Line 24/98/90 Right Basilic 3 days          Drain            External Urinary Catheter 3 days                 Lab Results: Results: I have personally reviewed pertinent reports  Imaging/EKG Studies: Results: I have personally reviewed pertinent reports      Other Studies: N/A  VTE Prophylaxis: Heparin

## 2021-08-30 NOTE — TELEMEDICINE
e-Consult (IPC)  - Interventional Radiology  Gladys Horne 80 y o  female MRN: [id number]  Unit/Bed#: [facility] 810-01 Encounter: [id number]    IR has been consulted to evaluate the patient, determine the appropriate procedure, and whether or not a procedure can and should be performed regarding the care of Gladys Horne  We were consulted by heme onc concerning concern for multiple myeloma, and to possibly perform a bone marrow biopsy if medically appropriate for the patient  IP Consult to IR  Consult performed by: EUNICE Hitchcock  Consult ordered by: EUNICE Garcia        08/30/21      Assessment/Recommendation:     80year old female with IgG Kappa MGUS with clinical concern for multiple myeloma, T7, T8, L3, L5 compression fractures and currently wearing a TSLO brace per neurosurgery and thoracic and lumbar spine precautions  Patient will require a bone marrow biopsy for diagnosis and clinical correlation between labs and suspicion of multiple myeloma  Given patients compression fractures, TSLO brace, and current pain, bone marrow biopsy may be technically challenging in terms of positioning and patient comfort      - NPO after MN  - plan for bone marrow biopsy in the afternoon of 8/31/2021  - do not need to hold heparin gtt prior to bone marrow  - appreciate heme onc note of gen path on chart      Total time spent in review of data, discussion with requesting provider and rendering advice was 30 minutes  Patient or appropriate family member was verbally informed by heme onc of this consultative service on their behalf to provide more timely access to specialty care in lieu of an in person consultation  Verbal consent was obtained  Thank you for allowing Interventional Radiology to participate in the care of Gladys Horne  Please don't hesitate to call or TigerText us with any questions       [address] [place] [address]

## 2021-08-30 NOTE — PROGRESS NOTES
[address] [facility] NOTE   Harmony Hull 80 y o  female MRN: [id number]  Unit/Bed#: [facility] 810-01 Encounter: [id number]  Reason for Consult:  Hypercalcemia    ASSESSMENT and PLAN:    66-year-old female with back pain recently found to have compression fractures for home nephrology is consulted for hypercalcemia  Ring patient had presented initially with unresponsiveness syncopal episode and went to [facility] and was transferred to [facility] for evaluation due to compression fractures  1) hypercalcemia    -initial CT scan without sign of malignancy  -SPEP-immunofixation with IgG kappa  -UPEP-  -free light chain-6 1 with elevated kappa free chain  -PTH-61  -PTHrP-  -patient received Zometa on August 28th  -initially started on intravenous fluids  -August 30th-calcium improving  Creatinine stable 0 5  Plan  -awaiting bone marrow biopsy  -BMP in a m     2) 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    -baseline creatinine 0 9-1 1 mg/dL  -urinalysis in June bland  -admission creatinine 2 2 mg/dL  -initially received intravenous fluids  -CT scan without hydronephrosis but bilateral nephrolithiasis  -ARB initially held    3) compression fractures    -per primary team    4) acid/base    5) DVT-new-per primary team    6) hypertension    -holding ARB    7) hypophosphatemia-improved with repletion    SUBJECTIVE / 24H INTERVAL HISTORY:    Patient with pain left rib side  Blood pressure is 199-848 systolic  No shortness of breath  Urine output 1 L with 2 time not recorded      OBJECTIVE:  Current Weight: Weight - Scale: 49 9 kg (110 lb 0 2 oz)  Vitals:    08/30/21 0005 08/30/21 0314 08/30/21 0751 08/30/21 1105   BP: 125/77 125/76 120/73 114/67   Pulse: 100 91 82 84   Resp: 18 18 18 18   Temp: 99 2 °F (37 3 °C) 98 3 °F (36 8 °C) (!) 97 3 °F (36 3 °C) 97 8 °F (36 6 °C)   TempSrc:       SpO2: 96% 100% 97% 97%   Weight:       Height:           Intake/Output Summary (Last 24 hours) at 8/30/2021 [address] filed at 8/29/2021 1801  Gross per 24 hour   Intake --   Output 250 ml   Net -250 ml       General: NAD  Skin: no rash  Eyes: anicteric sclera  ENT: moist mucous membrane  Neck: supple  Chest: CTA b/l, no ronchii, no wheeze, no rubs, no rales  CVS: s1s2, no murmur, no gallop, no rub  Abdomen: soft, nontender, nl sounds  Extremities:  Trace to 1+ edema LE b/l  : no vilchis  Neuro: AAOX3  Psych: normal affect    Medications:    Current Facility-Administered Medications:     acetaminophen (TYLENOL) tablet 975 mg, 975 mg, Oral, Q8H [address], Chelsie Edouard PA-C, 975 mg at 08/30/21 0556    ALPRAZolam (XANAX) tablet 0 25 mg, 0 25 mg, Oral, BID PRN, Kay Yohan, DO    dicyclomine (BENTYL) capsule 20 mg, 20 mg, Oral, 4x Daily PRN, Yevgeniy Chalk Hakim, DO    docusate sodium (COLACE) capsule 100 mg, 100 mg, Oral, BID, Juanita Hakim, DO, 100 mg at 08/30/21 0851    gabapentin (NEURONTIN) capsule 100 mg, 100 mg, Oral, BID, Juanita Hakim, DO, 100 mg at 08/30/21 8902    heparin (porcine) 25,000 units in 0 45% NaCl 250 mL infusion (premix), 3-30 Units/kg/hr (Order-Specific), Intravenous, Titrated, EUNICE Salamanca, Last Rate: 4 5 mL/hr at 08/28/21 1301, 9 Units/kg/hr at 08/28/21 1301    heparin (porcine) injection 2,000 Units, 2,000 Units, Intravenous, Q1H PRN, Bob Tazewell, CRNP    heparin (porcine) injection 4,000 Units, 4,000 Units, Intravenous, Q1H PRN, Bob Tazewell, CRNP    lidocaine (LIDODERM) 5 % patch 2 patch, 2 patch, Topical, Daily, Chelsie Edouard PA-C, 2 patch at 08/30/21 0852    ondansetron (ZOFRAN) injection 4 mg, 4 mg, Intravenous, Q6H PRN, Sarah Templeton MD    oxyCODONE (ROXICODONE) IR tablet 2 5 mg, 2 5 mg, Oral, Q4H PRN, Patti Blanco MD, 2 5 mg at 08/29/21 1952    pantoprazole (PROTONIX) EC tablet 40 mg, 40 mg, Oral, Early Morning, Juanita Nina DO, 40 mg at 08/30/21 0556    polyethylene glycol (MIRALAX) packet 17 g, 17 g, Oral, Daily, Juanita Nina DO, 17 g at 08/30/21 0828    sucralfate (CARAFATE) tablet 1 g, 1 g, Oral, 4x Daily (AC & HS), Juanita Nina, , 1 g at 08/30/21 1158    Laboratory Results:  Results from last 7 days   Lab Units 08/30/21  0549 08/29/21  1153 08/28/21  1046 08/27/21  1515 08/27/21  0255 08/26/21  1206 08/26/21  0438 08/25/21  1954   WBC Thousand/uL  --   --  10 52* 12 09*  --  13 56* 14 59* 17 59*   HEMOGLOBIN g/dL  --   --  11 2* 11 3*  --  13 0 13 5 13 6   HEMATOCRIT %  --   --  33 9* 34 2*  --  40 2 41 3 42 0   PLATELETS Thousands/uL  --   --  198 187  --  172 197 202   POTASSIUM mmol/L 3 9 3 5 3 0* 3 3* 3 6  --  3 7 4 3   CHLORIDE mmol/L 110* 111* 107 105 105  --  105 102   CO2 mmol/L 23 22 21 24 26  --  25 26   BUN mg/dL 24 18 24 30* 30*  --  35* 33*   CREATININE mg/dL 0 58* 0 49* 0 56* 0 74 0 87  --  1 53* 2 24*   CALCIUM mg/dL 8 7 8 9 10 3* 10 6* 10 6* 10 9* 11 3* 11 9*   MAGNESIUM mg/dL  --   --   --  1 9  --   --   --   --    PHOSPHORUS mg/dL 2 3 1 6* 1 3* 1 8*  --   --   --   --

## 2021-08-31 ENCOUNTER — TELEPHONE (OUTPATIENT)
Dept: HEMATOLOGY ONCOLOGY | Facility: CLINIC | Age: 86
End: 2021-08-31

## 2021-08-31 ENCOUNTER — APPOINTMENT (INPATIENT)
Dept: RADIOLOGY | Facility: HOSPITAL | Age: 86
DRG: 840 | End: 2021-08-31
Payer: MEDICARE

## 2021-08-31 LAB
ANION GAP SERPL CALCULATED.3IONS-SCNC: 4 MMOL/L (ref 4–13)
APTT PPP: 67 SECONDS (ref 23–37)
BACTERIA BLD CULT: NORMAL
BACTERIA BLD CULT: NORMAL
BASOPHILS # BLD AUTO: 0.06 THOUSANDS/ΜL (ref 0–0.1)
BASOPHILS NFR BLD AUTO: 1 % (ref 0–1)
BUN SERPL-MCNC: 30 MG/DL (ref 5–25)
CALCIUM SERPL-MCNC: 8.5 MG/DL (ref 8.3–10.1)
CHLORIDE SERPL-SCNC: 110 MMOL/L (ref 100–108)
CO2 SERPL-SCNC: 24 MMOL/L (ref 21–32)
CREAT SERPL-MCNC: 0.59 MG/DL (ref 0.6–1.3)
EOSINOPHIL # BLD AUTO: 0.26 THOUSAND/ΜL (ref 0–0.61)
EOSINOPHIL NFR BLD AUTO: 3 % (ref 0–6)
ERYTHROCYTE [DISTWIDTH] IN BLOOD BY AUTOMATED COUNT: 14.6 % (ref 11.6–15.1)
GFR SERPL CREATININE-BSD FRML MDRD: 82 ML/MIN/1.73SQ M
GLUCOSE SERPL-MCNC: 109 MG/DL (ref 65–140)
HCT VFR BLD AUTO: 27.2 % (ref 34.8–46.1)
HGB BLD-MCNC: 8.9 G/DL (ref 11.5–15.4)
IMM GRANULOCYTES # BLD AUTO: 0.21 THOUSAND/UL (ref 0–0.2)
IMM GRANULOCYTES NFR BLD AUTO: 2 % (ref 0–2)
LYMPHOCYTES # BLD AUTO: 1.69 THOUSANDS/ΜL (ref 0.6–4.47)
LYMPHOCYTES NFR BLD AUTO: 17 % (ref 14–44)
MCH RBC QN AUTO: 32.2 PG (ref 26.8–34.3)
MCHC RBC AUTO-ENTMCNC: 32.7 G/DL (ref 31.4–37.4)
MCV RBC AUTO: 99 FL (ref 82–98)
MONOCYTES # BLD AUTO: 0.94 THOUSAND/ΜL (ref 0.17–1.22)
MONOCYTES NFR BLD AUTO: 9 % (ref 4–12)
NEUTROPHILS # BLD AUTO: 6.84 THOUSANDS/ΜL (ref 1.85–7.62)
NEUTS SEG NFR BLD AUTO: 68 % (ref 43–75)
NRBC BLD AUTO-RTO: 0 /100 WBCS
PLATELET # BLD AUTO: 185 THOUSANDS/UL (ref 149–390)
PMV BLD AUTO: 11.1 FL (ref 8.9–12.7)
POTASSIUM SERPL-SCNC: 3.8 MMOL/L (ref 3.5–5.3)
RBC # BLD AUTO: 2.76 MILLION/UL (ref 3.81–5.12)
SODIUM SERPL-SCNC: 138 MMOL/L (ref 136–145)
WBC # BLD AUTO: 10 THOUSAND/UL (ref 4.31–10.16)

## 2021-08-31 PROCEDURE — 88364 INSITU HYBRIDIZATION (FISH): CPT | Performed by: PATHOLOGY

## 2021-08-31 PROCEDURE — 38222 DX BONE MARROW BX & ASPIR: CPT | Performed by: RADIOLOGY

## 2021-08-31 PROCEDURE — 88305 TISSUE EXAM BY PATHOLOGIST: CPT | Performed by: PATHOLOGY

## 2021-08-31 PROCEDURE — 88237 TISSUE CULTURE BONE MARROW: CPT | Performed by: PHYSICIAN ASSISTANT

## 2021-08-31 PROCEDURE — 88341 IMHCHEM/IMCYTCHM EA ADD ANTB: CPT | Performed by: PATHOLOGY

## 2021-08-31 PROCEDURE — 99232 SBSQ HOSP IP/OBS MODERATE 35: CPT | Performed by: PHYSICIAN ASSISTANT

## 2021-08-31 PROCEDURE — 88184 FLOWCYTOMETRY/ TC 1 MARKER: CPT | Performed by: INTERNAL MEDICINE

## 2021-08-31 PROCEDURE — 77012 CT SCAN FOR NEEDLE BIOPSY: CPT | Performed by: RADIOLOGY

## 2021-08-31 PROCEDURE — 88313 SPECIAL STAINS GROUP 2: CPT | Performed by: PATHOLOGY

## 2021-08-31 PROCEDURE — 85097 BONE MARROW INTERPRETATION: CPT | Performed by: PATHOLOGY

## 2021-08-31 PROCEDURE — 88342 IMHCHEM/IMCYTCHM 1ST ANTB: CPT | Performed by: PATHOLOGY

## 2021-08-31 PROCEDURE — 88185 FLOWCYTOMETRY/TC ADD-ON: CPT

## 2021-08-31 PROCEDURE — 85730 THROMBOPLASTIN TIME PARTIAL: CPT | Performed by: SURGERY

## 2021-08-31 PROCEDURE — 88311 DECALCIFY TISSUE: CPT | Performed by: PATHOLOGY

## 2021-08-31 PROCEDURE — 85025 COMPLETE CBC W/AUTO DIFF WBC: CPT | Performed by: PHYSICIAN ASSISTANT

## 2021-08-31 PROCEDURE — 88374 M/PHMTRC ALYS ISHQUANT/SEMIQ: CPT

## 2021-08-31 PROCEDURE — 88365 INSITU HYBRIDIZATION (FISH): CPT | Performed by: PATHOLOGY

## 2021-08-31 PROCEDURE — 07DR3ZX EXTRACTION OF ILIAC BONE MARROW, PERCUTANEOUS APPROACH, DIAGNOSTIC: ICD-10-PCS | Performed by: RADIOLOGY

## 2021-08-31 PROCEDURE — 99222 1ST HOSP IP/OBS MODERATE 55: CPT | Performed by: INTERNAL MEDICINE

## 2021-08-31 PROCEDURE — 99232 SBSQ HOSP IP/OBS MODERATE 35: CPT | Performed by: INTERNAL MEDICINE

## 2021-08-31 PROCEDURE — 99152 MOD SED SAME PHYS/QHP 5/>YRS: CPT

## 2021-08-31 PROCEDURE — 88367 INSITU HYBRIDIZATION AUTO: CPT | Performed by: PHYSICIAN ASSISTANT

## 2021-08-31 PROCEDURE — 99152 MOD SED SAME PHYS/QHP 5/>YRS: CPT | Performed by: RADIOLOGY

## 2021-08-31 PROCEDURE — 80048 BASIC METABOLIC PNL TOTAL CA: CPT | Performed by: INTERNAL MEDICINE

## 2021-08-31 PROCEDURE — 38222 DX BONE MARROW BX & ASPIR: CPT

## 2021-08-31 PROCEDURE — 97530 THERAPEUTIC ACTIVITIES: CPT

## 2021-08-31 PROCEDURE — 88373 M/PHMTRC ALYS ISHQUANT/SEMIQ: CPT

## 2021-08-31 PROCEDURE — 88262 CHROMOSOME ANALYSIS 15-20: CPT

## 2021-08-31 PROCEDURE — 97110 THERAPEUTIC EXERCISES: CPT

## 2021-08-31 PROCEDURE — 99233 SBSQ HOSP IP/OBS HIGH 50: CPT | Performed by: SURGERY

## 2021-08-31 RX ORDER — FENTANYL CITRATE 50 UG/ML
INJECTION, SOLUTION INTRAMUSCULAR; INTRAVENOUS CODE/TRAUMA/SEDATION MEDICATION
Status: COMPLETED | OUTPATIENT
Start: 2021-08-31 | End: 2021-08-31

## 2021-08-31 RX ORDER — MIDAZOLAM HYDROCHLORIDE 2 MG/2ML
INJECTION, SOLUTION INTRAMUSCULAR; INTRAVENOUS CODE/TRAUMA/SEDATION MEDICATION
Status: COMPLETED | OUTPATIENT
Start: 2021-08-31 | End: 2021-08-31

## 2021-08-31 RX ADMIN — GABAPENTIN 100 MG: 100 CAPSULE ORAL at 17:08

## 2021-08-31 RX ADMIN — MIDAZOLAM 1 MG: 1 INJECTION INTRAMUSCULAR; INTRAVENOUS at 14:42

## 2021-08-31 RX ADMIN — GABAPENTIN 100 MG: 100 CAPSULE ORAL at 08:31

## 2021-08-31 RX ADMIN — SUCRALFATE 1 G: 1 TABLET ORAL at 21:01

## 2021-08-31 RX ADMIN — ACETAMINOPHEN 975 MG: 325 TABLET, FILM COATED ORAL at 21:01

## 2021-08-31 RX ADMIN — PANTOPRAZOLE SODIUM 40 MG: 40 TABLET, DELAYED RELEASE ORAL at 05:46

## 2021-08-31 RX ADMIN — SODIUM CHLORIDE, SODIUM GLUCONATE, SODIUM ACETATE, POTASSIUM CHLORIDE, MAGNESIUM CHLORIDE, SODIUM PHOSPHATE, DIBASIC, AND POTASSIUM PHOSPHATE 50 ML/HR: .53; .5; .37; .037; .03; .012; .00082 INJECTION, SOLUTION INTRAVENOUS at 17:13

## 2021-08-31 RX ADMIN — ACETAMINOPHEN 975 MG: 325 TABLET, FILM COATED ORAL at 04:55

## 2021-08-31 RX ADMIN — SUCRALFATE 1 G: 1 TABLET ORAL at 05:46

## 2021-08-31 RX ADMIN — SUCRALFATE 1 G: 1 TABLET ORAL at 17:08

## 2021-08-31 RX ADMIN — FENTANYL CITRATE 50 MCG: 50 INJECTION INTRAMUSCULAR; INTRAVENOUS at 14:42

## 2021-08-31 NOTE — BRIEF OP NOTE (RAD/CATH)
INTERVENTIONAL RADIOLOGY PROCEDURE NOTE    Date: 8/31/2021    Procedure:  Bone marrow biopsy    Preoperative diagnosis:   1  Compression fracture of T8 vertebra, initial encounter ([facility] )    2  Compression fracture of L3 vertebra, initial encounter ([facility] )    3  Compression fracture of T7 vertebra, initial encounter ([facility] )    4  Compression fracture of L5 vertebra, initial encounter ([facility] )    5  Acute deep vein thrombosis (DVT) of distal vein of left lower extremity (HCC)    6  Hypercalcemia    7  Closed wedge compression fractures of T7, T8 vertebra ([facility] )    8  Syncope, unspecified syncope type         Postoperative diagnosis: Same  Surgeon: Wesley Sullivan MD     Assistant: None  No qualified resident was available  Blood loss:  None    Specimens:  Aspirate and core     Findings:  Bone marrow biopsy with CT guidance    Complications: None immediate      Anesthesia: conscious sedation

## 2021-08-31 NOTE — PLAN OF CARE
Problem: Potential for Falls  Goal: Patient will remain free of falls  Description: INTERVENTIONS:  - Educate patient/family on patient safety including physical limitations  - Instruct patient to call for assistance with activity   - Consult OT/PT to assist with strengthening/mobility   - Keep Call bell within reach  - Keep bed low and locked with side rails adjusted as appropriate  - Keep care items and personal belongings within reach  - Initiate and maintain comfort rounds  - Make Fall Risk Sign visible to staff  - Offer Toileting every 2 Hours, in advance of need  - Initiate/Maintain bed alarm  - Obtain necessary fall risk management equipment: yellow nonslip socks  - Apply yellow socks and bracelet for high fall risk patients  - Consider moving patient to room near nurses station  Outcome: Progressing     Problem: NEUROSENSORY - ADULT  Goal: Achieves stable or improved neurological status  Description: INTERVENTIONS  - Monitor and report changes in neurological status  - Monitor vital signs such as temperature, blood pressure, glucose, and any other labs ordered   - Initiate measures to prevent increased intracranial pressure  - Monitor for seizure activity and implement precautions if appropriate      Outcome: Progressing     Problem: METABOLIC, FLUID AND ELECTROLYTES - ADULT  Goal: Electrolytes maintained within normal limits  Description: INTERVENTIONS:  - Monitor labs and assess patient for signs and symptoms of electrolyte imbalances  - Administer electrolyte replacement as ordered  - Monitor response to electrolyte replacements, including repeat lab results as appropriate  - Instruct patient on fluid and nutrition as appropriate  Outcome: Progressing      Bed Management:  -Have minimal linens on bed & keep smooth, unwrinkled  -Change linens as needed when moist or perspiring  Toileting:  Outcome: Progressing     Problem: MOBILITY - ADULT  Goal: Maintain or return to baseline ADL function  Description: INTERVENTIONS:  -  Assess patient's ability to carry out ADLs; assess patient's baseline for ADL function and identify physical deficits which impact ability to perform ADLs (bathing, care of mouth/teeth, toileting, grooming, dressing, etc )  - Assess/evaluate cause of self-care deficits   - Assess range of motion  - Assess patient's mobility; develop plan if impaired  - Assess patient's need for assistive devices and provide as appropriate  - Encourage maximum independence but intervene and supervise when necessary  - Involve family in performance of ADLs  - Assess for home care needs following discharge   - Consider OT consult to assist with ADL evaluation and planning for discharge  - Provide patient education as appropriate  Outcome: Progressing  Goal: Maintains/Returns to pre admission functional level  Description: INTERVENTIONS:  - Perform BMAT or MOVE assessment daily    - Set and communicate daily mobility goal to care team and patient/family/caregiver  - Collaborate with rehabilitation services on mobility goals if consulted  - Perform Range of Motion 3 times a day  - Reposition patient every 2 hours    - Dangle patient 3 times a day  - Stand patient 3 times a day  - Ambulate patient 3 times a day  - Out of bed to chair 3 times a day   - Out of bed for meals 3 times a day  - Out of bed for toileting  - Record patient progress and toleration of activity level   Outcome: Progressing     Problem: Prexisting or High Potential for Compromised Skin Integrity  Goal: Skin integrity is maintained or improved  Description: INTERVENTIONS:  - Identify patients at risk for skin breakdown  - Assess and monitor skin integrity  - Assess and monitor nutrition and hydration status  - Monitor labs   - Assess for incontinence   - Turn and reposition patient  - Assist with mobility/ambulation  - Relieve pressure over bony prominences  - Avoid friction and shearing  - Provide appropriate hygiene as needed including keeping skin clean and dry  - Evaluate need for skin moisturizer/barrier cream  - Collaborate with interdisciplinary team   - Patient/family teaching  - Consider wound care consult   Outcome: Progressing     Problem: Knowledge Deficit  Goal: Patient/family/caregiver demonstrates understanding of disease process, treatment plan, medications, and discharge instructions  Description: Complete learning assessment and assess knowledge base  Interventions:  - Provide teaching at level of understanding  - Provide teaching via preferred learning methods  Outcome: Progressing     Problem: Nutrition/Hydration-ADULT  Goal: Nutrient/Hydration intake appropriate for improving, restoring or maintaining nutritional needs  Description: Monitor and assess patient's nutrition/hydration status for malnutrition  Collaborate with interdisciplinary team and initiate plan and interventions as ordered  Monitor patient's weight and dietary intake as ordered or per policy  Utilize nutrition screening tool and intervene as necessary  Determine patient's food preferences and provide high-protein, high-caloric foods as appropriate       INTERVENTIONS:  - Monitor oral intake, urinary output, labs, and treatment plans  - Assess nutrition and hydration status and recommend course of action  - Evaluate amount of meals eaten  - Assist patient with eating if necessary   - Allow adequate time for meals  - Recommend/ encourage appropriate diets, oral nutritional supplements, and vitamin/mineral supplements  - Order, calculate, and assess calorie counts as needed  - Recommend, monitor, and adjust tube feedings and TPN/PPN based on assessed needs  - Assess need for intravenous fluids  - Provide specific nutrition/hydration education as appropriate  - Include patient/family/caregiver in decisions related to nutrition  Outcome: Progressing

## 2021-08-31 NOTE — ASSESSMENT & PLAN NOTE
-LLE extensive DVT with resulting RLL lobar and segmental PE  -Troponin negative, BNP mildly elevated but less than it had been previously  -Heparin gtt, will need transition to oral regimen after bone biopsy  -Patient continues to deny any shortness of breath, chest pain, lightheadedness/dizziness

## 2021-08-31 NOTE — PHYSICAL THERAPY NOTE
Physical Therapy Evaluation    Patient's Name: Sarah Braun    Admitting Diagnosis  Acute deep vein thrombosis (DVT) of distal vein of left lower extremity ([place] [address] ) [I82 4Z2]  Unspecified multiple injuries, initial encounter [T07  XXXA]  Compression fracture of T7 vertebra, initial encounter ([place] [address] ) [S22 060A]  Compression fracture of T8 vertebra, initial encounter ([place] [address] ) [S22 060A]  Compression fracture of L3 vertebra, initial encounter ([place] [address] ) [S32 030A]  Compression fracture of L5 vertebra, initial encounter ([place] [address] ) [S32 050A]    Problem List  Patient Active Problem List   Diagnosis    Chronic kidney disease, stage 3 unspecified ([place] [address] )    Current mild episode of major depressive disorder without prior episode ([place] [address] )    Essential hypertension with goal blood pressure less than 140/90    Hyperlipidemia    Closed wedge compression fractures of T7, T8 vertebra (HCC)    Closed compression fractures of L3, L5 lumbar vertebra, initial encounter ([place] [address] )    Lower leg DVT (deep venous thromboembolism), acute, left (HCC)    Hypercalcemia    STEPHANIE (acute kidney injury) ([place] [address] )    Syncope    [facility] embolism [facility])       Past Medical History  Past Medical History:   Diagnosis Date    Chronic pain     Diverticulitis     GERD (gastroesophageal reflux disease)     Hypertension     Shingles     Shortness of breath        Past Surgical History  Past Surgical History:   Procedure Laterality Date    APPENDECTOMY      CHOLECYSTECTOMY      INCISION AND DRAINAGE RETROPHYARYNGEAL ABCESS      JOINT REPLACEMENT      PELVIC FRACTURE SURGERY      TOTAL HIP ARTHROPLASTY          08/31/21 0913   PT Last Visit   PT Visit Date 08/31/21   Note Type   Note Type Treatment   Pain Assessment   Pain Assessment Tool Pain Assessment not indicated - pt denies pain   Pain Score No Pain   Restrictions/Precautions   Weight Bearing Precautions Per Order No   Braces or Orthoses TLSO  (Backpack)   Other Precautions Cognitive; Chair Alarm; Fall Risk;Pain;Multiple lines;Telemetry   General   Chart Reviewed Yes   Family/Caregiver Present No   Cognition   Overall Cognitive Status Impaired   Arousal/Participation Alert   Attention Attends with cues to redirect   Orientation Level Oriented to person;Oriented to place; Disoriented to time;Disoriented to situation   Following Commands Follows one step commands with increased time or repetition   Comments Pt pleasant and cooperative, states she is upset her family didn't come to visit her yesterday, RN states family was here yesterday, pt states she feels she is slightly confused and forgetful  Pt follows commands appropriately with increased time for processing, fearful of falling   Bed Mobility   Supine to Sit 3  Moderate assistance   Additional items Assist x 1; Increased time required;Verbal cues   Additional Comments VC's for sequencing of log rolling, dependent for TLSO fitting   Transfers   Sit to Stand 2  Maximal assistance   Additional items Assist x 1; Increased time required;Verbal cues   Stand to Sit 2  Maximal assistance   Additional items Assist x 1; Increased time required;Verbal cues   Stand pivot 2  Maximal assistance   Additional items Assist x 1; Increased time required;Verbal cues   Additional Comments no AD, cues for anterior weight shift, fearful of falling, peformed x2 trials from bed and bedside chair   Ambulation/Elevation   Gait pattern Excessively slow; Improper Weight shift; Short stride;Decreased foot clearance  (posterior lean)   Gait Assistance 2  Maximal assist   Additional items Assist x 1   Distance 3 ft to chair   Balance   Static Sitting Fair -   Dynamic Sitting Poor +   Static Standing Poor   Dynamic Standing Poor   Ambulatory Poor   Activity Tolerance   Activity Tolerance Patient limited by fatigue   Medical Staff Made Aware Discussed with restorative for possible adjustment of TLSO   Nurse Made Aware RN updated   Chair alarm engaged at end of session   Exercises   Hip Abduction Supine;10 reps;AAROM; Bilateral   Knee AROM Long Arc Quad Sitting;10 reps;AROM; Bilateral  (x2 sets)   Ankle Pumps Sitting;15 reps;AROM; Bilateral   Marching Sitting;10 reps;AAROM; Bilateral   Assessment   Prognosis Fair   Problem List Decreased strength;Decreased endurance; Impaired balance;Decreased mobility; Decreased cognition;Decreased safety awareness;Pain;Orthopedic restrictions   Assessment Pt with improved activity tolerance this session, requires Ajay to maintain sitting balance at EOB approx 10 minutes  Pt fearful of falling with tendency for posterior lean in standing, improved with verbal and tactile cues  Pt with good tolerance to LE exercise, declines pain throughout activity  Pt reports some confusion and memory impairments, requesting to have lunch with her spouse today  Pt will benefit from continued skilled PT intervention during course of hospital stay to improve strength, endurance and balance to improve safety with functional mobility  Recommend IP rehab upon hospital D/C  Goals   Patient Goals to have lunch with my    STG Expiration Date 09/09/21   PT Treatment Day 1   Plan   Treatment/Interventions Functional transfer training;LE strengthening/ROM; Therapeutic exercise; Endurance training;Patient/family training;Cognitive reorientation;Equipment eval/education; Bed mobility;Gait training   Progress Progressing toward goals   PT Frequency Other (Comment)  (3-5x/week)   Recommendation   PT Discharge Recommendation Post acute rehabilitation services   PT - OK to Discharge Yes  (to IP rehab)   David 8 in Bed Without Bedrails 2   Lying on Back to Sitting on Edge of Flat Bed 2   Moving Bed to Chair 2   Standing Up From Chair 2   Walk in Room 1   Climb 3-5 Stairs 1   Basic Mobility Inpatient Raw Score 10   Turning Head Towards Sound 4   Follow Simple Instructions 3   Low Function Basic Mobility Raw Score 17   Low Function Basic Mobility Standardized Score 27 46       Wilmer Membreno, PT, DPT

## 2021-08-31 NOTE — ASSESSMENT & PLAN NOTE
· No reported trauma  · Neurologically intact  · Neurosurgery consulted, note appreciated    · TLSO brace ordered  · Upright XR completed  · Multimodal pain regimen  · PT/OT

## 2021-08-31 NOTE — TELEPHONE ENCOUNTER
Notified by Amy Lake PA-C for patient to be added to Dr Alex Charles's schedule in 2-3 weeks to discuss BM Bx results  Patient was added on 9/16 @ 1PM with Ezequiel Verde PA-C at the [place] [address]  I called patient and let her know of this new appointment

## 2021-08-31 NOTE — ASSESSMENT & PLAN NOTE
· Cr improving since initial STEPHANIE  · Transition to PO diet  · Continue to monitor kidney functioning with daily BMPs  · Hold nephrotoxic agents such as Lipitor--will discuss with Nephrology regarding re-initiation  · Nephrology following

## 2021-08-31 NOTE — PROGRESS NOTES
Progress note - Palliative and Supportive Care   Adine Hashimoto 80 y o  female [id number]    Patient Active Problem List   Diagnosis    Chronic kidney disease, stage 3 unspecified ([facility] [address] [facility]    Current mild episode of major depressive disorder without prior episode ([facility] [address] )    Essential hypertension with goal blood pressure less than 140/90    Hyperlipidemia    Closed wedge compression fractures of T7, T8 vertebra [facility]    Closed compression fractures of L3, L5 lumbar vertebra, initial encounter ([facility])    Lower leg DVT (deep venous thromboembolism), acute, left ([facility])    Hypercalcemia    STEPHANIE (acute kidney injury) ([facility] [address] )    Syncope    Pulmonary embolism ([facility])     Active Problems Addressed Today  CKD3  T7, T8, L3, and L5 compression fractures  Fracture associated pain  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  Anxiety  Goals of care discussion  Palliative care encounter    Plan:  1  Symptom management -    - acetaminophen 975mg PO Q8H [facility]   - oxyIR 2 5mg PO Q4H PRN moderate-severe pain   - lidocaine patches   - gabapentin 100mg BID   - miralax and colace QD   - xanax 0 25mg BID PRN (home medication)   - recommend against restarting tramadol which she was taking prior to admission    2  Goals - level 3 DNR   - Met with patient along with her spouse, son, and DIL today  - Ongoing disease directed cares with limits of DNR/DNI  Ultimate goals are to improve function, pain control, and balance quality and quantity of life  Patient and her spouse are aware that she will have skilled needs for the foreseeable future  - Pending bone marrow biopsy for work-up of multiple myeloma (scheduled for today)   - code status confirmed     Code Status: DNR- level 3   Decisional apparatus:  Patient is not competent on my exam today  If competence is lost, patient's substitute decision maker would default to spouse [place]  Advance Directive / Living Will / POLST:  None on file    3   Social support   - Time spent providing introduction to palliative to patient's family, they are agreeable to outpatient follow-up   - pastoral care following    Palliative care will sign off at this time as symptoms are well controlled and goals are well defined  Recommend outpatient follow-up within 1 week of discharge  Interval history:       No events overnight  Patient's pain remains well controlled  She used zero PRN doses of oxyIR in the past 24H  No other complaints  Pending bone marrow biopsy this afternoon  MEDICATIONS / ALLERGIES:     all current active meds have been reviewed    Allergies   Allergen Reactions    Aloe Other (See Comments)     Fresh fruit has reaction to   She can have canned fruit    Banana - Food Allergy Other (See Comments)     Itchy throat    Naproxen Diarrhea    Oxycodone-Acetaminophen Hives       OBJECTIVE:    Physical Exam  Physical Exam  Constitutional:       General: She is not in acute distress  Appearance: She is not ill-appearing  HENT:      Head: Normocephalic and atraumatic  Eyes:      Conjunctiva/sclera: Conjunctivae normal    Pulmonary:      Effort: Pulmonary effort is normal  No respiratory distress  Abdominal:      General: There is no distension  Tenderness: There is no guarding  Skin:     General: Skin is warm and dry  Neurological:      General: No focal deficit present  Mental Status: She is alert  Mental status is at baseline  Psychiatric:         Mood and Affect: Mood normal          Behavior: Behavior normal          Thought Content: Thought content normal          Judgment: Judgment normal          Lab Results:   I have personally reviewed pertinent labs  , CBC:   Lab Results   Component Value Date    WBC 10 00 08/31/2021    HGB 8 9 (L) 08/31/2021    HCT 27 2 (L) 08/31/2021    MCV 99 (H) 08/31/2021     08/31/2021    MCH 32 2 08/31/2021    MCHC 32 7 08/31/2021    RDW 14 6 08/31/2021    MPV 11 1 08/31/2021    NRBC 0 08/31/2021   , CMP:   Lab Results   Component Value Date    SODIUM 138 08/31/2021    K 3 8 08/31/2021     (H) 08/31/2021    CO2 24 08/31/2021    BUN 30 (H) 08/31/2021    CREATININE 0 59 (L) 08/31/2021    CALCIUM 8 5 08/31/2021    EGFR 82 08/31/2021     Imaging Studies: reviewed pertinent studies  EKG, Pathology, and Other Studies: reviewed pertinent studies

## 2021-08-31 NOTE — ASSESSMENT & PLAN NOTE
· Family reports patient being largely immobile for the past 3 weeks  · Patient has decreased pulses in left lower leg  Patient is noted to have swelling in LLE  · Duplex completed and DVT was reported, now with evidence of PE  · Continue heparin gtt, will need to be transitioned to Eliquis after bone biopsy    · Appreciate vascular surgery recommendations

## 2021-08-31 NOTE — PROGRESS NOTES
[address] [facility] NOTE   Loreli Check 80 y o  female MRN: [id number]  Unit/Bed#: [facility] 810-01 Encounter: [id number]  Reason for Consult:  Hypercalcemia    ASSESSMENT and PLAN:    77-year-old female with back pain recently found to have compression fractures for home nephrology is consulted for hypercalcemia  Ring patient had presented initially with unresponsiveness syncopal episode and went to [facility] and was transferred to [facility] for evaluation due to compression fractures     1) hypercalcemia     -initial CT scan without sign of malignancy  -SPEP-immunofixation with IgG kappa  -UPEP-  -free light chain-6 1 with elevated kappa free chain  -PTH-61  -PTHrP-  -patient received Zometa on August 28th  -initially started on intravenous fluids  -August 30th-calcium improving  Creatinine stable 0 5    -August 31st-creatinine stable 0 6 mg/dL  Calcium stable      Plan  -awaiting bone marrow biopsy  -BMP in a m   -from the renal standpoint, can hold intravenous fluid     2) 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     -baseline creatinine 0 9-1 1 mg/dL  -urinalysis in June bland  -admission creatinine 2 2 mg/dL  -initially received intravenous fluids  -CT scan without hydronephrosis but bilateral nephrolithiasis  -ARB initially held  -August 31st-creatinine improving     3) compression fractures     -per primary team     4) acid/base-stable     5) DVT-new-per primary team     6) hypertension     -holding ARB     7) hypophosphatemia-improved with repletion    SUBJECTIVE / 24H INTERVAL HISTORY:    Patient sitting in chair  Denies complaints  No shortness of breath  Hard of hearing  Blood pressure is stable  Afebrile      OBJECTIVE:  Current Weight: Weight - Scale: 49 9 kg (110 lb 0 2 oz)  Vitals:    08/30/21 1105 08/30/21 1550 08/30/21 2234 08/31/21 0749   BP: 114/67 112/67 132/73 131/87   Pulse: 84 91 98 86   Resp: 18 18 16 18   Temp: 97 8 °F (36 6 °C) 98 8 °F (37 1 °C) 97 5 °F (36 4 °C) 98 2 °F (36 8 °C)   TempSrc:       SpO2: 97% 98% 99% 98%   Weight:       Height:           Intake/Output Summary (Last 24 hours) at 8/31/2021 1131  Last data filed at 8/31/2021 0749  Gross per 24 hour   Intake 950 ml   Output 750 ml   Net 200 ml     General: NAD  Skin: no rash  Eyes: anicteric sclera  ENT: moist mucous membrane  Neck: supple  Chest: CTA b/l, no ronchii, no wheeze, no rubs, no rales  CVS: s1s2, no murmur, no gallop, no rub  Abdomen:  Has back brace making it difficult to properly exam abdomen  Extremities:  Trace edema LE b/l, left lower extremity wrapped  : no vilchis  Neuro: AAOX3  Psych: normal affect    Medications:    Current Facility-Administered Medications:     acetaminophen (TYLENOL) tablet 975 mg, 975 mg, Oral, Q8H ECTOR, Chelsie Edouard PA-C, 975 mg at 08/31/21 0455    ALPRAZolam (XANAX) tablet 0 25 mg, 0 25 mg, Oral, BID PRN, Bear Xu, DO    dicyclomine (BENTYL) capsule 20 mg, 20 mg, Oral, 4x Daily PRN, Agueda La Hakim, DO    docusate sodium (COLACE) capsule 100 mg, 100 mg, Oral, BID, Juanita Hakim, DO, 100 mg at 08/30/21 1730    gabapentin (NEURONTIN) capsule 100 mg, 100 mg, Oral, BID, Juanita Hakim, DO, 100 mg at 08/31/21 0831    heparin (porcine) 25,000 units in 0 45% NaCl 250 mL infusion (premix), 3-30 Units/kg/hr (Order-Specific), Intravenous, Titrated, [place] Bliss, CRNP, Last Rate: 4 5 mL/hr at 08/30/21 1837, 9 Units/kg/hr at 08/30/21 1837    heparin (porcine) injection 2,000 Units, 2,000 Units, Intravenous, Q1H PRN, [place] Bliss, CRNP    heparin (porcine) injection 4,000 Units, 4,000 Units, Intravenous, Q1H PRN, [place] Bliss, CRNP    lidocaine (LIDODERM) 5 % patch 2 patch, 2 patch, Topical, Daily, Chelsie Edouard PA-C, 2 patch at 08/30/21 0852    multi-electrolyte (PLASMALYTE-A/ISOLYTE-S PH 7 4) IV solution, 50 mL/hr, Intravenous, Continuous, Janina Georges MD, Last Rate: 50 mL/hr at 08/30/21 2108, 50 mL/hr at 08/30/21 2108    ondansetron (ZOFRAN) injection 4 mg, 4 mg, Intravenous, Q6H PRN, Winifred Templeton MD    oxyCODONE (ROXICODONE) IR tablet 2 5 mg, 2 5 mg, Oral, Q4H PRN, Winifred Templeton MD, 2 5 mg at 08/29/21 1952    pantoprazole (PROTONIX) EC tablet 40 mg, 40 mg, Oral, Early Morning, Juanita Nina DO, 40 mg at 08/31/21 0546    polyethylene glycol (MIRALAX) packet 17 g, 17 g, Oral, Daily, Juanita Nina DO, 17 g at 08/30/21 3411    sucralfate (CARAFATE) tablet 1 g, 1 g, Oral, 4x Daily (AC & HS), Juanita Nina DO, 1 g at 08/31/21 0546    Laboratory Results:  Results from last 7 days   Lab Units 08/31/21  0452 08/30/21  0549 08/29/21  1153 08/28/21  1046 08/27/21  1515 08/27/21  0255 08/26/21  1206 08/26/21  0438 08/25/21 1954   WBC Thousand/uL 10 00  --   --  10 52* 12 09*  --  13 56* 14 59* 17 59*   HEMOGLOBIN g/dL 8 9*  --   --  11 2* 11 3*  --  13 0 13 5 13 6   HEMATOCRIT % 27 2*  --   --  33 9* 34 2*  --  40 2 41 3 42 0   PLATELETS Thousands/uL 185  --   --  198 187  --  172 197 202   POTASSIUM mmol/L 3 8 3 9 3 5 3 0* 3 3* 3 6  --  3 7 4 3   CHLORIDE mmol/L 110* 110* 111* 107 105 105  --  105 102   CO2 mmol/L 24 23 22 21 24 26  --  25 26   BUN mg/dL 30* 24 18 24 30* 30*  --  35* 33*   CREATININE mg/dL 0 59* 0 58* 0 49* 0 56* 0 74 0 87  --  1 53* 2 24*   CALCIUM mg/dL 8 5 8 7 8 9 10 3* 10 6* 10 6* 10 9* 11 3* 11 9*   MAGNESIUM mg/dL  --   --   --   --  1 9  --   --   --   --    PHOSPHORUS mg/dL  --  2 3 1 6* 1 3* 1 8*  --   --   --   --

## 2021-08-31 NOTE — PROGRESS NOTES
[address] [facility]  Progress Note - Maria Eugenia Encinas 10/15/1931, 80 y o  female MRN: [id number]  Unit/Bed#: [facility] 810-01 Encounter: [id number]  Primary Care Provider: Chika Sweeney MD   Date and time admitted to hospital: 8/26/2021  2:10 AM    Syncope  Assessment & Plan  · Family reports patient syncopized thing while in a chair  · Echo demonstrated EF of 60%  · EKG/troponin--negative  · IVF  · Telemetry- concern for SVT overnight  · Cardiology consulted    Lower leg DVT (deep venous thromboembolism), acute, left ([facility] [address] )  Assessment & Plan  · Family reports patient being largely immobile for the past 3 weeks  · Patient has decreased pulses in left lower leg  Patient is noted to have swelling in LLE  · Duplex completed and DVT was reported, now with evidence of PE  · Continue heparin gtt, will need to be transitioned to Eliquis after bone biopsy  · Appreciate vascular surgery recommendations    Closed compression fractures of L3, L5 lumbar vertebra, initial encounter [facility])  Assessment & Plan  · No reported trauma  · Neurologically intact  · Neurosurgery consulted, note appreciated  · TLSO brace ordered  · Upright XR completed  · Multimodal pain regimen  · PT/OT    Closed wedge compression fractures of T7, T8 vertebra (HCC)  Assessment & Plan  · No reported trauma  · Neurologically intact  · Neurosurgery consulted, note appreciated    · TLSO brace ordered  · Upright XR completed  · Multimodal pain regimen  · PT/OT    Pulmonary embolism (HCC)  Assessment & Plan  -LLE extensive DVT with resulting RLL lobar and segmental PE  -Troponin negative, BNP mildly elevated but less than it had been previously  -Heparin gtt, will need transition to oral regimen after bone biopsy  -Patient continues to deny any shortness of breath, chest pain, lightheadedness/dizziness     STEPHANIE (acute kidney injury) ([facility] [address] )  Assessment & Plan  · Cr improving since initial STEPHANIE  · Transition to PO diet  · Continue to monitor kidney functioning with daily BMPs  · Hold nephrotoxic agents such as Lipitor--will discuss with Nephrology regarding re-initiation  · Nephrology following    Hypercalcemia  Assessment & Plan  · Corrected calcium was 13 1 on presentation  · IVF initiated- continues at 50 cc/hr  · Nephrology consulted and recommendations appreciated  · Concern for multiple myeloma  · Heme Onc consulted, pending bone marrow biopsy via IR, today  · Calcium level continues to be stable  · Continue telemetry    Hyperlipidemia  Assessment & Plan  · Hold home Lipitor due to kidney function in    Essential hypertension with goal blood pressure less than 140/90  Assessment & Plan  · Hold home Cozaar due to STEPHANIE  · Nephrology following, determine ability to resume  · BP appears well controlled          Disposition:  Pending IR bone biopsy      SUBJECTIVE:  Chief Complaint:  I feel much better    Subjective:  Patient describes feeling more awake and clearly thinking comparison to previous assessments  She denies any feelings of shortness of breath, chest pain, difficulty breathing  She notes having minimal back pain  She continues to have lower extremity edema primarily on LLE  Patient confirms understanding plan of care and denies have any questions  A 10 point review of systems was completed and is negative not otherwise mentioned above        OBJECTIVE:     Meds/Allergies     Current Facility-Administered Medications:     acetaminophen (TYLENOL) tablet 975 mg, 975 mg, Oral, Q8H [facility], Chelsie Edouard PA-C, 975 mg at 08/31/21 0455    ALPRAZolam (XANAX) tablet 0 25 mg, 0 25 mg, Oral, BID PRN, Walter Bragg,     dicyclomine (BENTYL) capsule 20 mg, 20 mg, Oral, 4x Daily PRN, Cayetano Nina DO    docusate sodium (COLACE) capsule 100 mg, 100 mg, Oral, BID, Juanita Hakim, DO, 100 mg at 08/30/21 1730    gabapentin (NEURONTIN) capsule 100 mg, 100 mg, Oral, BID, Juanita Hakim, DO, 100 mg at 08/31/21 1708    heparin (porcine) 25,000 units in 0 45% NaCl 250 mL infusion (premix), 3-30 Units/kg/hr (Order-Specific), Intravenous, Titrated, EUNICE Mart, Last Rate: 4 5 mL/hr at 08/30/21 1837, 9 Units/kg/hr at 08/30/21 1837    heparin (porcine) injection 2,000 Units, 2,000 Units, Intravenous, Q1H PRN, SUKHWINDER MartNP    heparin (porcine) injection 4,000 Units, 4,000 Units, Intravenous, Q1H PRN, EUNICE Mart    lidocaine (LIDODERM) 5 % patch 2 patch, 2 patch, Topical, Daily, Chelsie Edouard PA-C, 2 patch at 08/30/21 0852    multi-electrolyte (PLASMALYTE-A/ISOLYTE-S PH 7 4) IV solution, 50 mL/hr, Intravenous, Continuous, Alex Morris MD, Last Rate: 50 mL/hr at 08/31/21 1713, 50 mL/hr at 08/31/21 1713    ondansetron (ZOFRAN) injection 4 mg, 4 mg, Intravenous, Q6H PRN, Adrien Hurtado MD    oxyCODONE (ROXICODONE) IR tablet 2 5 mg, 2 5 mg, Oral, Q4H PRN, Concha Templeton MD, 2 5 mg at 08/29/21 1952    pantoprazole (PROTONIX) EC tablet 40 mg, 40 mg, Oral, Early Morning, Juanita Nina DO, 40 mg at 08/31/21 0546    polyethylene glycol (MIRALAX) packet 17 g, 17 g, Oral, Daily, Juanita Nina DO, 17 g at 08/30/21 0852    sucralfate (CARAFATE) tablet 1 g, 1 g, Oral, 4x Daily (AC & HS), Juanita Nina DO, 1 g at 08/31/21 1708     Vitals:   Vitals:    08/31/21 1525   BP: 129/84   Pulse: 83   Resp: 16   Temp:    SpO2: 99%       Intake/Output:  I/O       08/29 0701 - 08/30 0700 08/30 0701 - 08/31 0700 08/31 0701 - 09/01 0700    P  O    0    I V  (mL/kg) 1057 5 (21 2) 950 (19) 1004 2 (20 1)    Total Intake(mL/kg) 1057 5 (21 2) 950 (19) 1004 2 (20 1)    Urine (mL/kg/hr) 1050 (0 9) 750 (0 6) 400 (0 8)    Total Output 1050 750 400    Net +7 5 +200 +604 2           Unmeasured Urine Occurrence 2 x             Nutrition/GI Proph/Bowel Reg:  Continue current diet and bowel regimen  Physical Exam:   GENERAL APPEARANCE:  No acute distress  Cooperative  NEURO:  GCS 15  Light touch sensation intact throughout  HEENT:  Normocephalic, atraumatic    Neck supple  CV:  Regular rate and rhythm  No murmur, no rub, no gallop  +2 radial pulses bilaterally  +2 right DP  +1 left DP  LUNGS:  Clear to auscultation, bilaterally  No wheezing, no rhonchi, no rales  No orthopnea  No tachypnea  GI:  Abdomen is soft nontender  Bowel sounds are present  :  Pelvis stable  MSK:  Patient moving all extremities without any focal weakness or deformities noted  Left lower extremity displays +3 pitting edema  Right lower extremity displays +1 pitting edema  SKIN:  Warm, dry  Invasive Devices     Peripherally Inserted Central Catheter Line            PICC Line 24/19/26 Right Basilic 4 days          Drain            External Urinary Catheter 3 days                 Lab Results:   Results: I have personally reviewed pertinent reports   , BMP/CMP:   Lab Results   Component Value Date    SODIUM 138 08/31/2021    K 3 8 08/31/2021     (H) 08/31/2021    CO2 24 08/31/2021    BUN 30 (H) 08/31/2021    CREATININE 0 59 (L) 08/31/2021    CALCIUM 8 5 08/31/2021    EGFR 82 08/31/2021    and CBC:   Lab Results   Component Value Date    WBC 10 00 08/31/2021    HGB 8 9 (L) 08/31/2021    HCT 27 2 (L) 08/31/2021    MCV 99 (H) 08/31/2021     08/31/2021    MCH 32 2 08/31/2021    MCHC 32 7 08/31/2021    RDW 14 6 08/31/2021    MPV 11 1 08/31/2021    NRBC 0 08/31/2021     Imaging/EKG Studies: Results: I have personally reviewed pertinent reports      Other Studies: none  VTE Prophylaxis: Sequential compression device (Venodyne)  and Heparin

## 2021-08-31 NOTE — CONSULTS
Cardiology   Court Henry 80 y o  female MRN: [id number]  Unit/Bed#: [facility] 810-01 Encounter: [id number]      Reason for Consult / Principal Problem:  Syncope    Physician Requesting Consult:  Artur Tee DO    Cardiologist:  None    Assessment  1  Syncope / LOC  -Witnessed by pt's daughter - in - law; occurred while transferring the patient from a bed to a chair  Pt complaining of intense right-sided back pain (has chronic back pain issues ) - began to feel lightheaded and nauseated - became unresponsive for approximately 2-3 minutes  Possibly vasovagal mediated  Poor oral intake / dehydration also likely contributory - evidence of STEPHANIE on admission which improved w/IVF's   - reports isolated/infrequent presyncopal episodes in the past - He reports pt having experience symptoms of nausea and lightheadedness - during episodes of severe abdominal pain/cramping - as she does have frequent issues with constipation  Cardiac workup  -TTE 8/26; LVEF 60%, no regional wall motion abnormalities, RV normal size and systolic function, mild-to-moderate MR, trace TR, no AS or significant AI   -12 lead ECG 8/26; NSR, no acute ischemic changes   -Telemetry review; NSR, no significant jone or tachyarrhythmias identified   -Troponin x3; 0 03 -- 0 02 -- 0 02  -Hemodynamics stable (BP / HR well controlled)    2  IgG kappa MGUS  -Heme-Onc/palliative care following    3  RLL lobar/segmental PE / LLE DVT  -CTA chest PE study 8/26; lobar segmental PE in the RLL  -TTE 8/26; RV normal size/systolic function, trace TR  -Bilateral LE venous duplex; acute occlusive thrombus in the paired left posterior tibial veins extending through to the left common femoral vein  -On IV heparin GTT  Plan  -No objective cardiac etiology for syncope - workup thus far unremarkable   No further cardiac workup indicated at this time   -Agree w/systemic anticoagulation in the setting of # 3  -Need to encourage adequate oral intake / nutritional support       HPI: Court Henry 80y o  year old female a medical history of of essential hypertension, dyslipidemia, CKD stage 3, and IgG kappa MGUS - with clinical concern for multiple myeloma  She did not previously follow with a cardiologist prior to admission  Her mobility at baseline is very limited, and she is primarily bedbound  She initially presented to the [address] [place] [address] ED on 8/25/2021 after sustaining a witnessed syncopal episode at her home residence while being transferred from her bed to her chair  She unfortunately sustained multiple thoracic/lumbar compression fractures  Neuro surgery had been consulted and recommended conservative management with TLSO brace  She was also found to have evidence of LLE swelling/edema which prompted a venous duplex study which demonstrated acute LLE DVT  She subsequently underwent CTA imaging of her chest which had demonstrated an acute lobar/segmental PE in the RLL  She had been initiated on IV heparin GTT for systemic anticoagulation  Cardiology was consulted to evaluate for potential cardiac etiologies for her reported syncope      Family History:   Family History   Problem Relation Age of Onset    Stroke Mother      Historical Information   Past Medical History:   Diagnosis Date    Chronic pain     Diverticulitis     GERD (gastroesophageal reflux disease)     Hypertension     Shingles     Shortness of breath      Past Surgical History:   Procedure Laterality Date    APPENDECTOMY      CHOLECYSTECTOMY      INCISION AND DRAINAGE RETROPHYARYNGEAL ABCESS      JOINT REPLACEMENT      PELVIC FRACTURE SURGERY      TOTAL HIP ARTHROPLASTY       Social History   Social History     Substance and Sexual Activity   Alcohol Use Never     Social History     Substance and Sexual Activity   Drug Use Never     Social History     Tobacco Use   Smoking Status Never Smoker   Smokeless Tobacco Never Used     Family History:   Family History   Problem Relation Age of Onset    Stroke Mother        Review of Systems:  Review of Systems   Constitutional: Positive for fatigue  Negative for chills and fever  Respiratory: Negative for cough, chest tightness and shortness of breath  Cardiovascular: Negative for chest pain, palpitations and leg swelling  Gastrointestinal: Negative for abdominal pain  Musculoskeletal: Positive for back pain  Neurological: Negative for dizziness, light-headedness and headaches  All other systems reviewed and are negative  Scheduled Meds:  Current Facility-Administered Medications   Medication Dose Route Frequency Provider Last Rate    acetaminophen  975 mg Oral Q8H [facility] Chelsie Edouard PA-C      ALPRAZolam  0 25 mg Oral BID PRN Avril Glee, DO      dicyclomine  20 mg Oral 4x Daily PRN Juanita Haheavenlym, DO      docusate sodium  100 mg Oral BID Juanita Haheavenlym, DO      gabapentin  100 mg Oral BID Juanita Kelseym, DO      heparin (porcine)  3-30 Units/kg/hr (Order-Specific) Intravenous Titrated Jannell Stairs, CRNP 9 Units/kg/hr (08/30/21 1837)    heparin (porcine)  2,000 Units Intravenous Q1H PRN Jannell Stairs, CRNP      heparin (porcine)  4,000 Units Intravenous Q1H PRN Jannell Stairs, CRNP      lidocaine  2 patch Topical Daily Edie Grewal PA-C      multi-electrolyte  50 mL/hr Intravenous Continuous Kimberlyn Miranda MD 50 mL/hr (08/30/21 2108)    ondansetron  4 mg Intravenous Q6H PRN Jay Acosta MD      oxyCODONE  2 5 mg Oral Q4H PRN Jay Acosta MD      pantoprazole  40 mg Oral Early Morning Juanita Haheavenlym, DO      polyethylene glycol  17 g Oral Daily Juanita Hakim, DO      sucralfate  1 g Oral 4x Daily (AC & HS) Juanitalisa Colem, DO       Continuous Infusions:heparin (porcine), 3-30 Units/kg/hr (Order-Specific), Last Rate: 9 Units/kg/hr (08/30/21 1837)  multi-electrolyte, 50 mL/hr, Last Rate: 50 mL/hr (08/30/21 2108)      PRN Meds:  ALPRAZolam    dicyclomine    heparin (porcine)    heparin (porcine)    ondansetron    oxyCODONE  all current active meds have been reviewed and current meds:   Current Facility-Administered Medications   Medication Dose Route Frequency    acetaminophen (TYLENOL) tablet 975 mg  975 mg Oral Q8H [facility]    ALPRAZolam (XANAX) tablet 0 25 mg  0 25 mg Oral BID PRN    dicyclomine (BENTYL) capsule 20 mg  20 mg Oral 4x Daily PRN    docusate sodium (COLACE) capsule 100 mg  100 mg Oral BID    gabapentin (NEURONTIN) capsule 100 mg  100 mg Oral BID    heparin (porcine) 25,000 units in 0 45% NaCl 250 mL infusion (premix)  3-30 Units/kg/hr (Order-Specific) Intravenous Titrated    heparin (porcine) injection 2,000 Units  2,000 Units Intravenous Q1H PRN    heparin (porcine) injection 4,000 Units  4,000 Units Intravenous Q1H PRN    lidocaine (LIDODERM) 5 % patch 2 patch  2 patch Topical Daily    multi-electrolyte (PLASMALYTE-A/ISOLYTE-S PH 7 4) IV solution  50 mL/hr Intravenous Continuous    ondansetron (ZOFRAN) injection 4 mg  4 mg Intravenous Q6H PRN    oxyCODONE (ROXICODONE) IR tablet 2 5 mg  2 5 mg Oral Q4H PRN    pantoprazole (PROTONIX) EC tablet 40 mg  40 mg Oral Early Morning    polyethylene glycol (MIRALAX) packet 17 g  17 g Oral Daily    sucralfate (CARAFATE) tablet 1 g  1 g Oral 4x Daily (AC & HS)       Allergies   Allergen Reactions    Aloe Other (See Comments)     Fresh fruit has reaction to   She can have canned fruit    Banana - Food Allergy Other (See Comments)     Itchy throat    Naproxen Diarrhea    Oxycodone-Acetaminophen Hives       Objective   Vitals: Blood pressure 131/87, pulse 86, temperature 98 2 °F (36 8 °C), resp  rate 18, height 4' 10" (1 473 m), weight 49 9 kg (110 lb 0 2 oz), SpO2 98 %  , Body mass index is 22 99 kg/m² ,   Orthostatic Blood Pressures      Most Recent Value   Blood Pressure  131/87 filed at 08/31/2021 0749   Patient Position - Orthostatic VS  Lying filed at 08/27/2021 1531            Intake/Output Summary (Last 24 hours) at 8/31/2021 1113  Last data filed at 8/31/2021 0749  Gross per 24 hour   Intake 950 ml   Output 750 ml   Net 200 ml       Invasive Devices     Peripherally Inserted Central Catheter Line            PICC Line 99/23/16 Right Basilic 3 days          Drain            External Urinary Catheter 3 days              Physical Exam:  Physical Exam  Vitals and nursing note reviewed  Constitutional:       General: She is not in acute distress  Appearance: Normal appearance  She is not ill-appearing or diaphoretic  HENT:      Head: Normocephalic and atraumatic  Mouth/Throat:      Mouth: Mucous membranes are moist    Eyes:      General: No scleral icterus  Cardiovascular:      Rate and Rhythm: Normal rate and regular rhythm  Pulses: Normal pulses  Heart sounds: Normal heart sounds  No murmur heard  Pulmonary:      Effort: Pulmonary effort is normal       Breath sounds: Normal breath sounds  No wheezing or rales  Abdominal:      Palpations: Abdomen is soft  Musculoskeletal:      Cervical back: Neck supple  Right lower leg: Edema present  Left lower leg: Edema present  Skin:     General: Skin is warm and dry  Capillary Refill: Capillary refill takes less than 2 seconds  Neurological:      General: No focal deficit present  Mental Status: She is alert and oriented to person, place, and time     Psychiatric:         Mood and Affect: Mood normal          Lab Results:   Recent Results (from the past 24 hour(s))   Basic metabolic panel    Collection Time: 08/31/21  4:52 AM   Result Value Ref Range    Sodium 138 136 - 145 mmol/L    Potassium 3 8 3 5 - 5 3 mmol/L    Chloride 110 (H) 100 - 108 mmol/L    CO2 24 21 - 32 mmol/L    ANION GAP 4 4 - 13 mmol/L    BUN 30 (H) 5 - 25 mg/dL    Creatinine 0 59 (L) 0 60 - 1 30 mg/dL    Glucose 109 65 - 140 mg/dL    Calcium 8 5 8 3 - 10 1 mg/dL    eGFR 82 ml/min/1 73sq m   CBC and differential    Collection Time: 08/31/21  4:52 AM   Result Value Ref Range    WBC 10 00 4 31 - 10 16 Thousand/uL    RBC 2 76 (L) 3 81 - 5 12 Million/uL    Hemoglobin 8 9 (L) 11 5 - 15 4 g/dL    Hematocrit 27 2 (L) 34 8 - 46 1 %    MCV 99 (H) 82 - 98 fL    MCH 32 2 26 8 - 34 3 pg    MCHC 32 7 31 4 - 37 4 g/dL    RDW 14 6 11 6 - 15 1 %    MPV 11 1 8 9 - 12 7 fL    Platelets 055 738 - 463 Thousands/uL    nRBC 0 /100 WBCs    Neutrophils Relative 68 43 - 75 %    Immat GRANS % 2 0 - 2 %    Lymphocytes Relative 17 14 - 44 %    Monocytes Relative 9 4 - 12 %    Eosinophils Relative 3 0 - 6 %    Basophils Relative 1 0 - 1 %    Neutrophils Absolute 6 84 1 85 - 7 62 Thousands/µL    Immature Grans Absolute 0 21 (H) 0 00 - 0 20 Thousand/uL    Lymphocytes Absolute 1 69 0 60 - 4 47 Thousands/µL    Monocytes Absolute 0 94 0 17 - 1 22 Thousand/µL    Eosinophils Absolute 0 26 0 00 - 0 61 Thousand/µL    Basophils Absolute 0 06 0 00 - 0 10 Thousands/µL   APTT    Collection Time: 08/31/21  5:43 AM   Result Value Ref Range    PTT 67 (H) 23 - 37 seconds       Imaging: I have personally reviewed pertinent reports  and I have personally reviewed pertinent films in PACS    Code Status:  Level 3 DNR DNI    Epic/ Allscripts/Care Everywhere records reviewed:  Yes    * Please Note: Fluency DirectDictation voice to text software may have been used in the creation of this document   **

## 2021-08-31 NOTE — PROGRESS NOTES
H&P reviewed  There have been no interval changes since the time the H&P was written  /66   Pulse 81   Temp 98 2 °F (36 8 °C)   Resp 18   Ht 4' 10" (1 473 m)   Wt 49 9 kg (110 lb 0 2 oz)   SpO2 100%   BMI 22 99 kg/m²     Prior imaging was reviewed  Concern for multiple myeloma  Referred for bone marrow biopsy  Procedure discussed and questions answered  Informed written consent was obtained      Cheril Mortimer, MD

## 2021-08-31 NOTE — NURSING NOTE
Patient telemetry reading arr suspend, it was working at the beginning of the shift  Leads changed x2 and battery changed in the telemetry box  Charge nurse made aware and trauma  [employer] contacted to assist with fixing problem  At this time there are no other boxes available in the hospital, all floors and ER called  Will continue to monitor at this time

## 2021-08-31 NOTE — PLAN OF CARE
Problem: PHYSICAL THERAPY ADULT  Goal: Performs mobility at highest level of function for planned discharge setting  See evaluation for individualized goals  Description: Treatment/Interventions: OT, Spoke to nursing, Gait training, Bed mobility, Patient/family training, Endurance training, LE strengthening/ROM, Functional transfer training          See flowsheet documentation for full assessment, interventions and recommendations  Outcome: Progressing  Note: Prognosis: Fair  Problem List: Decreased strength, Decreased endurance, Impaired balance, Decreased mobility, Decreased cognition, Decreased safety awareness, Pain, Orthopedic restrictions  Assessment: Pt with improved activity tolerance this session, requires Ajay to maintain sitting balance at EOB approx 10 minutes  Pt fearful of falling with tendency for posterior lean in standing, improved with verbal and tactile cues  Pt with good tolerance to LE exercise, declines pain throughout activity  Pt reports some confusion and memory impairments, requesting to have lunch with her spouse today  Pt will benefit from continued skilled PT intervention during course of hospital stay to improve strength, endurance and balance to improve safety with functional mobility  Recommend IP rehab upon hospital D/C  Barriers to Discharge: Inaccessible home environment, Decreased caregiver support        PT Discharge Recommendation: Post acute rehabilitation services     PT - OK to Discharge: Yes (to IP rehab)    See flowsheet documentation for full assessment

## 2021-08-31 NOTE — SEDATION DOCUMENTATION
Bone marrow biopsy of right iliac crest performed by Dr Larry Herbert without complications  Tolerated well by patient, VSS  IR Procedure Bedrest Start Time is 1500 x 1 hour

## 2021-08-31 NOTE — ASSESSMENT & PLAN NOTE
· Corrected calcium was 13 1 on presentation  · IVF initiated- continues at 50 cc/hr  · Nephrology consulted and recommendations appreciated  · Concern for multiple myeloma  · Heme Onc consulted, pending bone marrow biopsy via IR, today    · Calcium level continues to be stable  · Continue telemetry

## 2021-08-31 NOTE — ASSESSMENT & PLAN NOTE
· Family reports patient syncopized thing while in a chair    · Echo demonstrated EF of 60%  · EKG/troponin--negative  · IVF  · Telemetry- concern for SVT overnight  · Cardiology consulted

## 2021-09-01 PROBLEM — N17.9 AKI (ACUTE KIDNEY INJURY) (HCC): Status: RESOLVED | Noted: 2021-08-26 | Resolved: 2021-09-01

## 2021-09-01 LAB
ANION GAP SERPL CALCULATED.3IONS-SCNC: 4 MMOL/L (ref 4–13)
APTT PPP: 51 SECONDS (ref 23–37)
B2 MICROGLOB SERPL-MCNC: 1.5 MG/L (ref 0.6–2.4)
BASOPHILS # BLD AUTO: 0.06 THOUSANDS/ΜL (ref 0–0.1)
BASOPHILS NFR BLD AUTO: 0 % (ref 0–1)
BUN SERPL-MCNC: 19 MG/DL (ref 5–25)
CALCIUM SERPL-MCNC: 8 MG/DL (ref 8.3–10.1)
CHLORIDE SERPL-SCNC: 111 MMOL/L (ref 100–108)
CO2 SERPL-SCNC: 24 MMOL/L (ref 21–32)
CREAT SERPL-MCNC: 0.52 MG/DL (ref 0.6–1.3)
EOSINOPHIL # BLD AUTO: 0.23 THOUSAND/ΜL (ref 0–0.61)
EOSINOPHIL NFR BLD AUTO: 2 % (ref 0–6)
ERYTHROCYTE [DISTWIDTH] IN BLOOD BY AUTOMATED COUNT: 14.7 % (ref 11.6–15.1)
GFR SERPL CREATININE-BSD FRML MDRD: 85 ML/MIN/1.73SQ M
GLUCOSE SERPL-MCNC: 81 MG/DL (ref 65–140)
HCT VFR BLD AUTO: 28.9 % (ref 34.8–46.1)
HGB BLD-MCNC: 9.6 G/DL (ref 11.5–15.4)
IMM GRANULOCYTES # BLD AUTO: 0.19 THOUSAND/UL (ref 0–0.2)
IMM GRANULOCYTES NFR BLD AUTO: 1 % (ref 0–2)
LYMPHOCYTES # BLD AUTO: 1.52 THOUSANDS/ΜL (ref 0.6–4.47)
LYMPHOCYTES NFR BLD AUTO: 11 % (ref 14–44)
MCH RBC QN AUTO: 32.8 PG (ref 26.8–34.3)
MCHC RBC AUTO-ENTMCNC: 33.2 G/DL (ref 31.4–37.4)
MCV RBC AUTO: 99 FL (ref 82–98)
MONOCYTES # BLD AUTO: 1.07 THOUSAND/ΜL (ref 0.17–1.22)
MONOCYTES NFR BLD AUTO: 8 % (ref 4–12)
NEUTROPHILS # BLD AUTO: 11.25 THOUSANDS/ΜL (ref 1.85–7.62)
NEUTS SEG NFR BLD AUTO: 78 % (ref 43–75)
NRBC BLD AUTO-RTO: 0 /100 WBCS
PLATELET # BLD AUTO: 242 THOUSANDS/UL (ref 149–390)
PMV BLD AUTO: 11.3 FL (ref 8.9–12.7)
POTASSIUM SERPL-SCNC: 3.4 MMOL/L (ref 3.5–5.3)
RBC # BLD AUTO: 2.93 MILLION/UL (ref 3.81–5.12)
SCAN RESULT: NORMAL
SODIUM SERPL-SCNC: 139 MMOL/L (ref 136–145)
WBC # BLD AUTO: 14.32 THOUSAND/UL (ref 4.31–10.16)

## 2021-09-01 PROCEDURE — 85025 COMPLETE CBC W/AUTO DIFF WBC: CPT | Performed by: NURSE PRACTITIONER

## 2021-09-01 PROCEDURE — 85730 THROMBOPLASTIN TIME PARTIAL: CPT | Performed by: SURGERY

## 2021-09-01 PROCEDURE — 80048 BASIC METABOLIC PNL TOTAL CA: CPT | Performed by: INTERNAL MEDICINE

## 2021-09-01 PROCEDURE — 97530 THERAPEUTIC ACTIVITIES: CPT

## 2021-09-01 PROCEDURE — 99233 SBSQ HOSP IP/OBS HIGH 50: CPT | Performed by: SURGERY

## 2021-09-01 PROCEDURE — 99232 SBSQ HOSP IP/OBS MODERATE 35: CPT | Performed by: INTERNAL MEDICINE

## 2021-09-01 PROCEDURE — 97110 THERAPEUTIC EXERCISES: CPT

## 2021-09-01 RX ORDER — POTASSIUM CHLORIDE 20 MEQ/1
20 TABLET, EXTENDED RELEASE ORAL ONCE
Status: COMPLETED | OUTPATIENT
Start: 2021-09-01 | End: 2021-09-01

## 2021-09-01 RX ORDER — LOSARTAN POTASSIUM 50 MG/1
50 TABLET ORAL DAILY
Status: DISCONTINUED | OUTPATIENT
Start: 2021-09-02 | End: 2021-09-02

## 2021-09-01 RX ORDER — ATORVASTATIN CALCIUM 20 MG/1
20 TABLET, FILM COATED ORAL
Status: DISCONTINUED | OUTPATIENT
Start: 2021-09-01 | End: 2021-09-04 | Stop reason: HOSPADM

## 2021-09-01 RX ADMIN — SUCRALFATE 1 G: 1 TABLET ORAL at 11:16

## 2021-09-01 RX ADMIN — SUCRALFATE 1 G: 1 TABLET ORAL at 05:18

## 2021-09-01 RX ADMIN — ACETAMINOPHEN 975 MG: 325 TABLET, FILM COATED ORAL at 05:17

## 2021-09-01 RX ADMIN — GABAPENTIN 100 MG: 100 CAPSULE ORAL at 08:17

## 2021-09-01 RX ADMIN — ATORVASTATIN CALCIUM 20 MG: 20 TABLET, FILM COATED ORAL at 17:21

## 2021-09-01 RX ADMIN — LIDOCAINE 2 PATCH: 50 PATCH TOPICAL at 08:17

## 2021-09-01 RX ADMIN — SUCRALFATE 1 G: 1 TABLET ORAL at 21:54

## 2021-09-01 RX ADMIN — APIXABAN 5 MG: 5 TABLET, FILM COATED ORAL at 12:34

## 2021-09-01 RX ADMIN — ACETAMINOPHEN 975 MG: 325 TABLET, FILM COATED ORAL at 21:54

## 2021-09-01 RX ADMIN — HEPARIN SODIUM 2000 UNITS: 1000 INJECTION INTRAVENOUS; SUBCUTANEOUS at 06:55

## 2021-09-01 RX ADMIN — SUCRALFATE 1 G: 1 TABLET ORAL at 17:21

## 2021-09-01 RX ADMIN — PANTOPRAZOLE SODIUM 40 MG: 40 TABLET, DELAYED RELEASE ORAL at 05:17

## 2021-09-01 RX ADMIN — GABAPENTIN 100 MG: 100 CAPSULE ORAL at 17:21

## 2021-09-01 RX ADMIN — APIXABAN 10 MG: 5 TABLET, FILM COATED ORAL at 17:21

## 2021-09-01 RX ADMIN — POTASSIUM CHLORIDE 20 MEQ: 1500 TABLET, EXTENDED RELEASE ORAL at 08:17

## 2021-09-01 NOTE — ASSESSMENT & PLAN NOTE
· Cr improving since initial STEPHANIE  · Transition to PO diet  · Continue to monitor kidney functioning with daily BMPs  · Nephrology following

## 2021-09-01 NOTE — PHYSICAL THERAPY NOTE
Physical Therapy Treatment Note    Patient's Name: Kathyanne Closs  : 10/15/1931       09/01/21 1041   PT Last Visit   PT Visit Date 21   Note Type   Note Type Treatment   Pain Assessment   Pain Assessment Tool 0-10   Pain Score 5   Pain Location/Orientation Orientation: Bilateral;Location: Back   Hospital Pain Intervention(s) Repositioned; Ambulation/increased activity   Restrictions/Precautions   Weight Bearing Precautions Per Order No   Braces or Orthoses TLSO  (Backpack)   Other Precautions Cognitive; Chair Alarm; Bed Alarm; Fall Risk;Pain   General   Chart Reviewed Yes   Family/Caregiver Present No   Cognition   Overall Cognitive Status Impaired   Arousal/Participation Alert   Attention Attends with cues to redirect   Orientation Level Oriented to person;Oriented to place; Disoriented to time;Disoriented to situation   Following Commands Follows one step commands with increased time or repetition   Comments Pt pleasant and cooperative, requires simple commands, fearful of falling   Bed Mobility   Supine to Sit 3  Moderate assistance   Additional items Assist x 1; Increased time required;Verbal cues   Additional Comments VC's for sequencing of bed mobility, modA for sitting balance initially due to posterior lean, improved with time to CGA for brief periods, posterior lean with fatigue  Dependent for donning TLSO   Transfers   Sit to Stand 3  Moderate assistance   Additional items Assist x 2; Increased time required;Verbal cues   Stand to Sit 3  Moderate assistance   Additional items Assist x 2; Increased time required;Verbal cues   Additional Comments with RW, VC's for hand placement, anterior weight shift, able to maintain static standing with RW and modA x1 for 30-45 seconds  Ambulation/Elevation   Gait pattern Excessively slow; Short stride;Decreased foot clearance; Improper Weight shift;Narrow JIMBO   Gait Assistance 3  Moderate assist   Additional items Assist x 2   Assistive Device Rolling walker   Distance 3 ft to chair, cues for increased L step height, tactile cues for weight shifting   Balance   Static Sitting Fair -   Dynamic Sitting Poor +   Static Standing Poor   Dynamic Standing Poor   Ambulatory Poor   Activity Tolerance   Activity Tolerance Patient limited by fatigue   Nurse Made Aware RN updated  Chair alarm engaged at end of session   Exercises   Quad Sets Supine;10 reps;Bilateral   Hip Abduction Supine;10 reps;AAROM; Bilateral   Knee AROM Long Arc Quad Sitting;10 reps;AROM; Bilateral  (x2 sets)   Ankle Pumps Sitting;15 reps;AROM; Bilateral   Marching Sitting;10 reps;AAROM; Bilateral   Assessment   Prognosis Fair   Problem List Decreased strength;Decreased endurance; Impaired balance;Decreased mobility; Decreased cognition;Decreased safety awareness;Pain;Orthopedic restrictions   Assessment Pt with improved standing tolerance today, remains limited by fear of falling  Pt able to perform transfers with RW use, posterior lean, decreased B/L step length  Pt with good tolerance to LE exercise, requires rest breaks between trials  Pt will benefit from continued skilled PT intervention during course of hospital stay to improve strength, endurance and balance to improve safety with functional mobility  Recommend IP rehab upon hospital D/C  Goals   Patient Goals to sit up    STG Expiration Date 09/09/21   PT Treatment Day 2   Plan   Treatment/Interventions Functional transfer training;LE strengthening/ROM; Elevations; Therapeutic exercise; Endurance training;Patient/family training;Cognitive reorientation;Equipment eval/education; Bed mobility;Gait training   Progress Slow progress, decreased activity tolerance   PT Frequency Other (Comment)  (3-5x/week)   Recommendation   PT Discharge Recommendation Post acute rehabilitation services   PT - OK to Discharge Yes  (to IP rehab)   David 8 in Bed Without Bedrails 2   Lying on Back to Sitting on Edge of Flat Bed 2   Moving Bed to Chair 2   Standing Up From Chair 2   Walk in Room 1   Climb 3-5 Stairs 1   Basic Mobility Inpatient Raw Score 10   Turning Head Towards Sound 4   Follow Simple Instructions 3   Low Function Basic Mobility Raw Score 17   Low Function Basic Mobility Standardized Score 27 46       Ralph Camarena, PT, DPT

## 2021-09-01 NOTE — ASSESSMENT & PLAN NOTE
· Cozaar was originally held due to STEPHANIE  · Nephrology consult  STEPHANIE resolved  · Restarted on Cozaar

## 2021-09-01 NOTE — PROGRESS NOTES
[facility]  Progress Note - Juanita Cedillo 10/15/1931, 80 y o  female MRN: [id number]  Unit/Bed#: [facility] 810-01 Encounter: [id number]  Primary Care Provider: Fuentes Kelly MD   Date and time admitted to hospital: 8/26/2021  2:10 AM    Syncope  Assessment & Plan  · Family reports patient syncopized thing while in a chair  · Echo demonstrated EF of 60%  · EKG/troponin--negative  · Cardiology consulted--believed to be related to dehydration  No further workup indicated at this time  · Patient has been asymptomatic since admission  Lower leg DVT (deep venous thromboembolism), acute, left ([address] )  Assessment & Plan  · Family reports patient being largely immobile for the past 3 weeks  · Patient has decreased pulses in left lower leg  Patient is noted to have swelling in LLE  · Duplex completed and DVT was reported, now with evidence of PE  · Heparin drip discontinued, patient was started on Eliquis  · Appreciate vascular surgery recommendations    Closed compression fractures of L3, L5 lumbar vertebra, initial encounter [facility])  Assessment & Plan  · No reported trauma  · Neurologically intact  · Neurosurgery consulted, note appreciated  · TLSO brace ordered  · Upright XR completed  · Multimodal pain regimen  · PT/OT-rehab    Closed wedge compression fractures of T7, T8 vertebra (HCC)  Assessment & Plan  · No reported trauma  · Neurologically intact  · Neurosurgery consulted, note appreciated  · TLSO brace ordered  · Upright XR completed  · Multimodal pain regimen  · PT/OT-rehab    Pulmonary embolism (HCC)  Assessment & Plan  -LLE extensive DVT with resulting RLL lobar and segmental PE  -Troponin negative, BNP mildly elevated but less than it had been previously  -heparin drip will be stopped today    Patient will be transition to Eliquis   -Patient continues to deny any shortness of breath, chest pain, lightheadedness/dizziness     Hypercalcemia  Assessment & Plan  · Corrected calcium was 13 1 on presentation  · Nephrology consulted and recommendations appreciated  Calcium level stable  · Concern for multiple myeloma  · Heme Onc consulted, pending bone marrow biopsy results pending    Hyperlipidemia  Assessment & Plan  · Hold home Lipitor initially held due to kidney functioning  · Restarted home atorvastatin    Essential hypertension with goal blood pressure less than 140/90  Assessment & Plan  · Cozaar was originally held due to STEPHANIE  · Nephrology consult  STEPHANIE resolved  · Restarted on Cozaar  STEPHANIE (acute kidney injury) (HCC)-resolved as of 9/1/2021  Assessment & Plan  · Cr improving since initial STEPHANIE  · Transition to PO diet  · Continue to monitor kidney functioning with daily BMPs  · Nephrology following          Disposition:  Pending placement  SUBJECTIVE:  Chief Complaint:  Patient denies any complaints  Subjective:  Patient describes feeling well overall  She denies any specific complaints  She describes having minimal back pain  She describes her appetite is decreased  She denies any feelings of lightheadedness, dizziness, confusion  A 10 point review of systems was completed is negative not otherwise mentioned above  Daughter [facility] was attempted to be called for update, no answer        OBJECTIVE:     Meds/Allergies     Current Facility-Administered Medications:     acetaminophen (TYLENOL) tablet 975 mg, 975 mg, Oral, Q8H [facility], Chelsie Edouard PA-C, 975 mg at 09/01/21 0517    ALPRAZolam (XANAX) tablet 0 25 mg, 0 25 mg, Oral, BID PRN, Ke Aden DO    dicyclomine (BENTYL) capsule 20 mg, 20 mg, Oral, 4x Daily PRN, Xavi Nina DO    docusate sodium (COLACE) capsule 100 mg, 100 mg, Oral, BID, Juanita Hakim, DO, 100 mg at 08/30/21 1730    gabapentin (NEURONTIN) capsule 100 mg, 100 mg, Oral, BID, Juanita Hakim, DO, 100 mg at 09/01/21 0817    heparin (porcine) 25,000 units in 0 45% NaCl 250 mL infusion (premix), 3-30 Units/kg/hr (Order-Specific), Intravenous, Titrated, EUNICE Reynoso, Last Rate: 5 5 mL/hr at 09/01/21 0654, 11 Units/kg/hr at 09/01/21 0654    heparin (porcine) injection 2,000 Units, 2,000 Units, Intravenous, Q1H PRN, EUNICE Reynoso, 2,000 Units at 09/01/21 0655    heparin (porcine) injection 4,000 Units, 4,000 Units, Intravenous, Q1H PRN, EUNICE Reynoso    lidocaine (LIDODERM) 5 % patch 2 patch, 2 patch, Topical, Daily, Susan Lipoma, PA-C, 2 patch at 09/01/21 0817    ondansetron (ZOFRAN) injection 4 mg, 4 mg, Intravenous, Q6H PRN, David Clinton MD    oxyCODONE (ROXICODONE) IR tablet 2 5 mg, 2 5 mg, Oral, Q4H PRN, Antoinette Templeton MD, 2 5 mg at 08/29/21 1952    pantoprazole (PROTONIX) EC tablet 40 mg, 40 mg, Oral, Early Morning, Juanita Nina DO, 40 mg at 09/01/21 0517    polyethylene glycol (MIRALAX) packet 17 g, 17 g, Oral, Daily, Juanita Nina DO, 17 g at 08/30/21 0852    sucralfate (CARAFATE) tablet 1 g, 1 g, Oral, 4x Daily (AC & HS), Juanita Nina DO, 1 g at 09/01/21 1116     Vitals:   Vitals:    09/01/21 1113   BP: 139/84   Pulse: 95   Resp: 18   Temp: 98 °F (36 7 °C)   SpO2: 96%       Intake/Output:  I/O       08/30 0701 - 08/31 0700 08/31 0701 - 09/01 0700 09/01 0701 - 09/02 0700    P  O   0 120    I V  (mL/kg) 950 (19) 1004 2 (20 1) 951 7 (19 1)    Total Intake(mL/kg) 950 (19) 1004 2 (20 1) 1071 7 (21 5)    Urine (mL/kg/hr) 750 (0 6) 1100 (0 9)     Total Output 750 1100     Net +200 -95 8 +1071 7           Unmeasured Urine Occurrence   1 x           Nutrition/GI Proph/Bowel Reg:  Continue current diet and bowel regimen  Physical Exam:   GENERAL APPEARANCE:  No acute distress  Cooperative  NEURO:  GCS 15  Light touch sensation intact throughout  HEENT:  Normocephalic, atraumatic  Neck supple  CV:  Regular rate and rhythm  No murmur, no rub, no gallop  Bilateral radial pulses are +2  Left DP is +1  Right DP +2  LUNGS:  Clear to auscultation, bilaterally    No wheezing, no rhonchi, no rales   GI:  Abdomen is soft nontender  :  Pelvis stable  MSK:  Patient moving all extremities without focal weakness no deformities noted  Left lower extremity does have +3 lower extremity edema  SKIN:  Warm, dry, well perfused  Invasive Devices     Peripherally Inserted Central Catheter Line            PICC Line 54/11/22 Right Basilic 4 days          Drain            External Urinary Catheter 4 days                 Lab Results:   Results: I have personally reviewed pertinent reports   , BMP/CMP:   Lab Results   Component Value Date    SODIUM 139 09/01/2021    K 3 4 (L) 09/01/2021     (H) 09/01/2021    CO2 24 09/01/2021    BUN 19 09/01/2021    CREATININE 0 52 (L) 09/01/2021    CALCIUM 8 0 (L) 09/01/2021    EGFR 85 09/01/2021    and CBC:   Lab Results   Component Value Date    WBC 14 32 (H) 09/01/2021    HGB 9 6 (L) 09/01/2021    HCT 28 9 (L) 09/01/2021    MCV 99 (H) 09/01/2021     09/01/2021    MCH 32 8 09/01/2021    MCHC 33 2 09/01/2021    RDW 14 7 09/01/2021    MPV 11 3 09/01/2021    NRBC 0 09/01/2021     Imaging/EKG Studies: Results: I have personally reviewed pertinent reports      Other Studies: none  VTE Prophylaxis: Sequential compression device (Venodyne) Eliquis

## 2021-09-01 NOTE — CASE MANAGEMENT
Cm spoke to pt's  pertaining to d/c plan  Pt's 's first choice is [facility] for SNF but states that he doesn't feel the pt is stable for d/c yet  MAYELA explained that securing SNF placement is important to the d/c process and that a discussion will be had with the medical team about d/c timing     Pt received both doses of Moderna Feb 2021 and March 2021

## 2021-09-01 NOTE — ASSESSMENT & PLAN NOTE
· Family reports patient being largely immobile for the past 3 weeks  · Patient has decreased pulses in left lower leg  Patient is noted to have swelling in LLE  · Duplex completed and DVT was reported, now with evidence of PE  · Heparin drip discontinued, patient was started on Eliquis    · Appreciate vascular surgery recommendations

## 2021-09-01 NOTE — ASSESSMENT & PLAN NOTE
· Family reports patient syncopized thing while in a chair  · Echo demonstrated EF of 60%  · EKG/troponin--negative  · Cardiology consulted--believed to be related to dehydration  No further workup indicated at this time  · Patient has been asymptomatic since admission

## 2021-09-01 NOTE — ASSESSMENT & PLAN NOTE
· No reported trauma  · Neurologically intact  · Neurosurgery consulted, note appreciated    · TLSO brace ordered  · Upright XR completed  · Multimodal pain regimen  · PT/OT-rehab

## 2021-09-01 NOTE — PLAN OF CARE
Problem: PHYSICAL THERAPY ADULT  Goal: Performs mobility at highest level of function for planned discharge setting  See evaluation for individualized goals  Description: Treatment/Interventions: OT, Spoke to nursing, Gait training, Bed mobility, Patient/family training, Endurance training, LE strengthening/ROM, Functional transfer training          See flowsheet documentation for full assessment, interventions and recommendations  Outcome: Progressing  Note: Prognosis: Fair  Problem List: Decreased strength, Decreased endurance, Impaired balance, Decreased mobility, Decreased cognition, Decreased safety awareness, Pain, Orthopedic restrictions  Assessment: Pt with improved standing tolerance today, remains limited by fear of falling  Pt able to perform transfers with RW use, posterior lean, decreased B/L step length  Pt with good tolerance to LE exercise, requires rest breaks between trials  Pt will benefit from continued skilled PT intervention during course of hospital stay to improve strength, endurance and balance to improve safety with functional mobility  Recommend IP rehab upon hospital D/C  Barriers to Discharge: Inaccessible home environment, Decreased caregiver support        PT Discharge Recommendation: Post acute rehabilitation services     PT - OK to Discharge: Yes (to IP rehab)    See flowsheet documentation for full assessment

## 2021-09-01 NOTE — ASSESSMENT & PLAN NOTE
· Corrected calcium was 13 1 on presentation  · Nephrology consulted and recommendations appreciated  Calcium level stable  · Concern for multiple myeloma     · Heme Onc consulted, pending bone marrow biopsy results pending

## 2021-09-01 NOTE — ASSESSMENT & PLAN NOTE
-LLE extensive DVT with resulting RLL lobar and segmental PE  -Troponin negative, BNP mildly elevated but less than it had been previously  -heparin drip will be stopped today    Patient will be transition to Eliquis   -Patient continues to deny any shortness of breath, chest pain, lightheadedness/dizziness

## 2021-09-01 NOTE — PROGRESS NOTES
Progress Note - Geriatric Medicine   Linda Stockton 80 y o  female MRN: [id number]  [facility] Encounter: [id number]      Assessment/Plan:    Acute metabolic encephalopathy  -markedly improved, mentation continues to wax and wane to some mild degree however markedly improved and likely near baseline  -multifactorial including hypercalcemia, fall traumatic injury, acute pain, hospitalization  -calcium since been stabilized and corrected  -continue to monitor for and treat acute metabolic derangements as arise  -encourage normal circadian rhythm  -continue to monitor for fecal and urinary tension  -ensure that acute pain is well controlled  -encourage use of sensory assist devices such as glasses and hearing assist devices  -reorient frequently    Hypercalcemia  -s/p tx with IVF and Zometa on admission   -bone marrow biopsy to r/o MM, results pending   -nephrology following    Syncope  -no events reported on telemetry, EF 60% by Echo (8/26/21)  -suspected due to dehydration and response to pain per Cardiology  -continue to encourage good body mechanics to reduce risk orthostatic hypotension and vasovagal symptoms    Thoracolumbar vertebral compression fractures  -continue TLSO brace per Neurosurgery  -continue acute pain control  -outpatient follow-up with PCP for ongoing management of osteoporosis    Acute pain due to trauma  -continue acute multimodal pain control per geriatric pain protocol  -taper regimen as pain improves with healing  -continue bowel regimen to reduce risk constipation    Left lower extremity DVT  -continue systemic oral anticoagulation for 3 months per vascular surgery    Impaired vision  -encourage use of corrective lenses at appropriate times  -encourage appropriate footwear adequate lighting at all times when out of bed  -consider large font for printed materials provided to patient    Impaired hearing  -speak loudly clearly standing directly patient while minimizing background noise to improve engagement  -encourage use of sensory assist devices such as hearing aids or amplifier as appropriate  -encourage use teach back method to ensure clear communication    Frailty in geriatric patient  -due to age and co-morbidities  -continue to address acute metabolic derangements as arise  -optimization chronic medical conditions ongoing  -continue to ensure that treatments and interventions are in line with patient's and family's wishes and goals of care, palliative care following -appreciate input and recommendations    Care coordination:  Rounded with Perla Collins (RN)    Subjective:     Patient seen examined bedside where she is resting comfortably, reports he slept well overnight, reports some mild left hip pain denies additional complaints at this time  Nursing reports no acute events overnight  Review of Systems   Constitutional: Negative for appetite change, chills, fatigue and fever  HENT: Negative  Eyes: Negative  Respiratory: Negative  Cardiovascular: Negative  Gastrointestinal: Negative  Endocrine: Negative  Genitourinary: Negative  Musculoskeletal: Positive for arthralgias (left hip)  Skin: Negative  Allergic/Immunologic: Negative  Neurological: Negative for dizziness and light-headedness  Hematological: Does not bruise/bleed easily  Psychiatric/Behavioral: Negative for sleep disturbance  All other systems reviewed and are negative  Objective:     Vitals: Blood pressure 136/83, pulse 85, temperature 98 1 °F (36 7 °C), resp  rate 20, height 4' 10" (1 473 m), weight 49 9 kg (110 lb 0 2 oz), SpO2 99 %  ,Body mass index is 22 99 kg/m²  Intake/Output Summary (Last 24 hours) at 9/1/2021 0738  Last data filed at 9/1/2021 0501  Gross per 24 hour   Intake 1004 17 ml   Output 1100 ml   Net -95 83 ml     Current Medications: Reviewed    Physical Exam:   Physical Exam  Vitals and nursing note reviewed     Constitutional:       Comments: Frail elderly female in no acute distress sitting in bed resting comfortably    HENT:      Head: Normocephalic and atraumatic  Nose: Nose normal       Mouth/Throat:      Mouth: Mucous membranes are moist    Eyes:      General: No scleral icterus  Right eye: No discharge  Left eye: No discharge  Comments: Wearing glasses at time of eval   Neck:      Comments: Phonation normal  Cardiovascular:      Rate and Rhythm: Normal rate  Pulses: Normal pulses  Pulmonary:      Effort: No respiratory distress  Breath sounds: No wheezing  Abdominal:      Palpations: Abdomen is soft  Tenderness: There is no abdominal tenderness  Musculoskeletal:      Cervical back: Neck supple  Right lower leg: No edema  Left lower leg: No edema  Comments: Reduced overall muscle mass   Skin:     General: Skin is warm and dry  Comments: Thin and friable, R elbow bruising    Neurological:      Mental Status: She is alert  Comments: Awake and alert , forgetful    Psychiatric:      Comments: Pleasant and cooperative         Invasive Devices     Peripherally Inserted Central Catheter Line            PICC Line 30/04/62 Right Basilic 4 days          Drain            External Urinary Catheter 4 days              Lab, Imaging and other studies: I have personally reviewed pertinent reports

## 2021-09-01 NOTE — PROGRESS NOTES
[address] [facility] NOTE   Dl Lawton 80 y o  female MRN: [id number]  Unit/Bed#: [facility] 810-01 Encounter: [id number]  Reason for Consult: hypercalcemia    ASSESSMENT and PLAN:    51-year-old female with back pain recently found to have compression fractures for home nephrology is consulted for hypercalcemia   Ring patient had presented initially with unresponsiveness syncopal episode and went to [facility] and was transferred to [facility] for evaluation due to compression fractures     1) hypercalcemia     -initial CT scan without sign of malignancy  -SPEP-immunofixation with IgG kappa  -UPEP-  -free light chain-6 1 with elevated kappa free chain  -PTH-61  -PTHrP-  -patient received Zometa on August 28th  -initially started on intravenous fluids  -August 30th-calcium improving   Creatinine stable 0 5    -August 31st-creatinine stable 0 6 mg/dL  Calcium stable  Status post bone marrow biopsy  - sept 1st - creat 0 5 mg/dL  Mukesh stable       Plan  -BMP in a m   -from the renal standpoint patient is stable and awaiting bone marrow biopsy     2) 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     -baseline creatinine 0 9-1 1 mg/dL question a  -urinalysis in June bland  -admission creatinine 2 2 mg/dL  -initially received intravenous fluids  -CT scan without hydronephrosis but bilateral nephrolithiasis  -ARB initially held  -August 31st-creatinine stable  -September 1st-creatinine stable and below baseline (off ARB)     3) compression fractures     -per primary team     4) acid/base-stable     5) DVT-new-per primary team     6) hypertension     -holding ARB     7) hypophosphatemia-improved with repletion       SUBJECTIVE / 24H INTERVAL HISTORY:    Blood pressure is 594-390 systolic  Urine output 1 1 L  Patient seen at bedside  Was having a bowel movement so did not disturb but the patient denies complaints      OBJECTIVE:  Current Weight: Weight - Scale: 49 9 kg (110 lb 0 2 oz)  Vitals:    08/31/21 1454 08/31/21 1525 08/31/21 2255 09/01/21 0723   BP: 137/70 129/84 127/75 136/83   Pulse: 84 83 85    Resp:  16 16 20   Temp:   98 3 °F (36 8 °C) 98 1 °F (36 7 °C)   TempSrc:       SpO2: 100% 99% 99%    Weight:       Height:           Intake/Output Summary (Last 24 hours) at 9/1/2021 1025  Last data filed at 9/1/2021 0501  Gross per 24 hour   Intake 1004 17 ml   Output 1100 ml   Net -95 83 ml     Very limited exam   Patient is awake  Oriented  Respiratory rate appears intact  No significant edema visualized  On lower extremities    Medications:    Current Facility-Administered Medications:     acetaminophen (TYLENOL) tablet 975 mg, 975 mg, Oral, Q8H [facility], Chelsie Edouard PA-C, 975 mg at 09/01/21 0517    ALPRAZolam (XANAX) tablet 0 25 mg, 0 25 mg, Oral, BID PRN, Sanaz Ghosh DO    dicyclomine (BENTYL) capsule 20 mg, 20 mg, Oral, 4x Daily PRN, Beula Shield Santiagokim, DO    docusate sodium (COLACE) capsule 100 mg, 100 mg, Oral, BID, Juanita Hakim, DO, 100 mg at 08/30/21 1730    gabapentin (NEURONTIN) capsule 100 mg, 100 mg, Oral, BID, Juanita Hakim, DO, 100 mg at 09/01/21 0817    heparin (porcine) 25,000 units in 0 45% NaCl 250 mL infusion (premix), 3-30 Units/kg/hr (Order-Specific), Intravenous, Titrated, Tavares Guess, CRNP, Last Rate: 5 5 mL/hr at 09/01/21 0654, 11 Units/kg/hr at 09/01/21 0654    heparin (porcine) injection 2,000 Units, 2,000 Units, Intravenous, Q1H PRN, Tavares Guess, CRNP, 2,000 Units at 09/01/21 0655    heparin (porcine) injection 4,000 Units, 4,000 Units, Intravenous, Q1H PRN, Tavares Guess, CRNP    lidocaine (LIDODERM) 5 % patch 2 patch, 2 patch, Topical, Daily, Chelsie Edouard PA-C, 2 patch at 09/01/21 0817    multi-electrolyte (PLASMALYTE-A/ISOLYTE-S PH 7 4) IV solution, 50 mL/hr, Intravenous, Continuous, Olga Chanel MD, Last Rate: 50 mL/hr at 08/31/21 1713, 50 mL/hr at 08/31/21 1713    ondansetron (ZOFRAN) injection 4 mg, 4 mg, Intravenous, Q6H PRN, Sugey Bauman MD    oxyCODONE (ROXICODONE) IR tablet 2 5 mg, 2 5 mg, Oral, Q4H PRN, Josiah Templeton MD, 2 5 mg at 08/29/21 1952    pantoprazole (PROTONIX) EC tablet 40 mg, 40 mg, Oral, Early Morning, Juanita Nina DO, 40 mg at 09/01/21 0517    polyethylene glycol (MIRALAX) packet 17 g, 17 g, Oral, Daily, Juanita Nina DO, 17 g at 08/30/21 8789    sucralfate (CARAFATE) tablet 1 g, 1 g, Oral, 4x Daily (AC & HS), Juanita Colem, DO, 1 g at 09/01/21 0518    Laboratory Results:  Results from last 7 days   Lab Units 09/01/21  0515 09/01/21  0514 08/31/21  0452 08/30/21  0549 08/29/21  1153 08/28/21  1046 08/27/21  1515 08/27/21  0255 08/26/21  1206 08/26/21  0438 08/25/21 1954   WBC Thousand/uL 14 32*  --  10 00  --   --  10 52* 12 09*  --  13 56* 14 59* 17 59*   HEMOGLOBIN g/dL 9 6*  --  8 9*  --   --  11 2* 11 3*  --  13 0 13 5 13 6   HEMATOCRIT % 28 9*  --  27 2*  --   --  33 9* 34 2*  --  40 2 41 3 42 0   PLATELETS Thousands/uL 242  --  185  --   --  198 187  --  172 197 202   POTASSIUM mmol/L  --  3 4* 3 8 3 9 3 5 3 0* 3 3* 3 6  --  3 7 4 3   CHLORIDE mmol/L  --  111* 110* 110* 111* 107 105 105  --  105 102   CO2 mmol/L  --  24 24 23 22 21 24 26  --  25 26   BUN mg/dL  --  19 30* 24 18 24 30* 30*  --  35* 33*   CREATININE mg/dL  --  0 52* 0 59* 0 58* 0 49* 0 56* 0 74 0 87  --  1 53* 2 24*   CALCIUM mg/dL  --  8 0* 8 5 8 7 8 9 10 3* 10 6* 10 6* 10 9* 11 3* 11 9*   MAGNESIUM mg/dL  --   --   --   --   --   --  1 9  --   --   --   --    PHOSPHORUS mg/dL  --   --   --  2 3 1 6* 1 3* 1 8*  --   --   --   --

## 2021-09-02 ENCOUNTER — APPOINTMENT (INPATIENT)
Dept: RADIOLOGY | Facility: HOSPITAL | Age: 86
DRG: 840 | End: 2021-09-02
Payer: MEDICARE

## 2021-09-02 LAB
ANION GAP SERPL CALCULATED.3IONS-SCNC: 4 MMOL/L (ref 4–13)
BUN SERPL-MCNC: 21 MG/DL (ref 5–25)
CALCIUM SERPL-MCNC: 8.3 MG/DL (ref 8.3–10.1)
CHLORIDE SERPL-SCNC: 112 MMOL/L (ref 100–108)
CO2 SERPL-SCNC: 23 MMOL/L (ref 21–32)
CREAT SERPL-MCNC: 0.54 MG/DL (ref 0.6–1.3)
GFR SERPL CREATININE-BSD FRML MDRD: 84 ML/MIN/1.73SQ M
GLUCOSE SERPL-MCNC: 71 MG/DL (ref 65–140)
HCT VFR BLD AUTO: 32.3 % (ref 34.8–46.1)
HGB BLD-MCNC: 10.5 G/DL (ref 11.5–15.4)
POTASSIUM SERPL-SCNC: 3.6 MMOL/L (ref 3.5–5.3)
SODIUM SERPL-SCNC: 139 MMOL/L (ref 136–145)

## 2021-09-02 PROCEDURE — 97530 THERAPEUTIC ACTIVITIES: CPT

## 2021-09-02 PROCEDURE — 85018 HEMOGLOBIN: CPT | Performed by: PHYSICIAN ASSISTANT

## 2021-09-02 PROCEDURE — 80048 BASIC METABOLIC PNL TOTAL CA: CPT | Performed by: INTERNAL MEDICINE

## 2021-09-02 PROCEDURE — 99233 SBSQ HOSP IP/OBS HIGH 50: CPT | Performed by: SURGERY

## 2021-09-02 PROCEDURE — 99232 SBSQ HOSP IP/OBS MODERATE 35: CPT | Performed by: INTERNAL MEDICINE

## 2021-09-02 PROCEDURE — 77075 RADEX OSSEOUS SURVEY COMPL: CPT

## 2021-09-02 PROCEDURE — 85014 HEMATOCRIT: CPT | Performed by: PHYSICIAN ASSISTANT

## 2021-09-02 PROCEDURE — 97535 SELF CARE MNGMENT TRAINING: CPT

## 2021-09-02 RX ORDER — LOSARTAN POTASSIUM 50 MG/1
50 TABLET ORAL DAILY
Status: DISCONTINUED | OUTPATIENT
Start: 2021-09-03 | End: 2021-09-04 | Stop reason: HOSPADM

## 2021-09-02 RX ADMIN — SUCRALFATE 1 G: 1 TABLET ORAL at 23:01

## 2021-09-02 RX ADMIN — ACETAMINOPHEN 975 MG: 325 TABLET, FILM COATED ORAL at 13:14

## 2021-09-02 RX ADMIN — GABAPENTIN 100 MG: 100 CAPSULE ORAL at 08:27

## 2021-09-02 RX ADMIN — GABAPENTIN 100 MG: 100 CAPSULE ORAL at 17:00

## 2021-09-02 RX ADMIN — OXYCODONE HYDROCHLORIDE 2.5 MG: 5 TABLET ORAL at 16:58

## 2021-09-02 RX ADMIN — SUCRALFATE 1 G: 1 TABLET ORAL at 05:23

## 2021-09-02 RX ADMIN — APIXABAN 10 MG: 5 TABLET, FILM COATED ORAL at 08:27

## 2021-09-02 RX ADMIN — ACETAMINOPHEN 975 MG: 325 TABLET, FILM COATED ORAL at 23:01

## 2021-09-02 RX ADMIN — PANTOPRAZOLE SODIUM 40 MG: 40 TABLET, DELAYED RELEASE ORAL at 05:22

## 2021-09-02 RX ADMIN — SUCRALFATE 1 G: 1 TABLET ORAL at 16:58

## 2021-09-02 RX ADMIN — ATORVASTATIN CALCIUM 20 MG: 20 TABLET, FILM COATED ORAL at 17:00

## 2021-09-02 RX ADMIN — LOSARTAN POTASSIUM 50 MG: 50 TABLET, FILM COATED ORAL at 08:27

## 2021-09-02 RX ADMIN — APIXABAN 10 MG: 5 TABLET, FILM COATED ORAL at 17:00

## 2021-09-02 RX ADMIN — SUCRALFATE 1 G: 1 TABLET ORAL at 11:07

## 2021-09-02 RX ADMIN — LIDOCAINE 2 PATCH: 50 PATCH TOPICAL at 08:27

## 2021-09-02 RX ADMIN — ACETAMINOPHEN 975 MG: 325 TABLET, FILM COATED ORAL at 05:22

## 2021-09-02 NOTE — PROGRESS NOTES
[address] [facility]  Progress Note - Charles Lopez 10/15/1931, 80 y o  female MRN: [id number]  Unit/Bed#: [facility] 810-01 Encounter: [id number]  Primary Care Provider: Leonidas Ramirez MD   Date and time admitted to hospital: 8/26/2021  2:10 AM    Pulmonary embolism (HCC)  Assessment & Plan  -LLE extensive DVT with resulting RLL lobar and segmental PE  -Troponin negative, BNP mildly elevated but less than it had been previously  -heparin drip will be stopped today  Patient will be transition to Eliquis   -Patient continues to deny any shortness of breath, chest pain, lightheadedness/dizziness     Syncope  Assessment & Plan  · Family reports patient syncopized thing while in a chair  · Echo demonstrated EF of 60%  · EKG/troponin--negative  · Cardiology consulted--believed to be related to dehydration  No further workup indicated at this time  · Patient has been asymptomatic since admission  Hypercalcemia  Assessment & Plan  · Corrected calcium was 13 1 on presentation  · Nephrology consulted and recommendations appreciated  Calcium level stable  · Concern for multiple myeloma  · Heme Onc consulted, pending bone marrow biopsy results pending    Lower leg DVT (deep venous thromboembolism), acute, left (HCC)  Assessment & Plan  · Family reports patient being largely immobile for the past 3 weeks  · Patient has decreased pulses in left lower leg  Patient is noted to have swelling in LLE  · Duplex completed and DVT was reported, now with evidence of PE  · Heparin drip discontinued, patient was started on Eliquis  · Appreciate vascular surgery recommendations    Closed compression fractures of L3, L5 lumbar vertebra, initial encounter [facility])  Assessment & Plan  · No reported trauma  · Neurologically intact  · Neurosurgery consulted, note appreciated    · TLSO brace ordered  · Upright XR completed  · Multimodal pain regimen  · PT/OT-rehab    Closed wedge compression fractures of T7, T8 vertebra ([address] )  Assessment & Plan  · No reported trauma  · Neurologically intact  · Neurosurgery consulted, note appreciated  · TLSO brace ordered  · Upright XR completed  · Multimodal pain regimen  · PT/OT-rehab    Hyperlipidemia  Assessment & Plan  · Hold home Lipitor initially held due to kidney functioning  · Restarted home atorvastatin    Essential hypertension with goal blood pressure less than 140/90  Assessment & Plan  · Cozaar was originally held due to STEPHANIE  · Nephrology consult  STEPHANIE resolved  · Restarted on Cozaar  DVT prophylaxis: SCDs and Eliquis  PT and OT:  Rehab    Disposition:  DC planning  Continue Eliquis  Update family at bedside  SUBJECTIVE:  Chief Complaint: "No new complaints "    Subjective: Patient offering no new complaints today  States she is feeling better  Denies any new pain        OBJECTIVE:     Meds/Allergies     Current Facility-Administered Medications:     acetaminophen (TYLENOL) tablet 975 mg, 975 mg, Oral, Q8H [address], Chelsie Edouard PA-C, 975 mg at 09/02/21 0522    ALPRAZolam (XANAX) tablet 0 25 mg, 0 25 mg, Oral, BID PRN, Dorenda Rodes, DO    apixaban (ELIQUIS) tablet 10 mg, 10 mg, Oral, BID, Siddharth Sammie, CRNP, 10 mg at 09/02/21 0827    [START ON 9/6/2021] apixaban (ELIQUIS) tablet 5 mg, 5 mg, Oral, BID, Siddharth Sammie, CRNP    atorvastatin (LIPITOR) tablet 20 mg, 20 mg, Oral, After Dinner, Siddharth Sammie, CRNP, 20 mg at 09/01/21 1721    dicyclomine (BENTYL) capsule 20 mg, 20 mg, Oral, 4x Daily PRN, Dorenda Rodes, DO    docusate sodium (COLACE) capsule 100 mg, 100 mg, Oral, BID, Juanita Hakim, DO, 100 mg at 08/30/21 1730    gabapentin (NEURONTIN) capsule 100 mg, 100 mg, Oral, BID, Juanita Hakim, DO, 100 mg at 09/02/21 0827    lidocaine (LIDODERM) 5 % patch 2 patch, 2 patch, Topical, Daily, Chelsie Edouard PA-C, 2 patch at 09/02/21 0827    losartan (COZAAR) tablet 50 mg, 50 mg, Oral, Daily, EUNICE Martin, 50 mg at 09/02/21 0827    ondansetron (Sujit Purpura) injection 4 mg, 4 mg, Intravenous, Q6H PRN, Antoinette Templeton MD    oxyCODONE (ROXICODONE) IR tablet 2 5 mg, 2 5 mg, Oral, Q4H PRN, Antoinette Templeton MD, 2 5 mg at 08/29/21 1952    pantoprazole (PROTONIX) EC tablet 40 mg, 40 mg, Oral, Early Morning, Juanita Nina DO, 40 mg at 09/02/21 0522    polyethylene glycol (MIRALAX) packet 17 g, 17 g, Oral, Daily, Juanita Colem, DO, 17 g at 08/30/21 0852    sucralfate (CARAFATE) tablet 1 g, 1 g, Oral, 4x Daily (AC & HS), Juanita Colem, DO, 1 g at 09/02/21 1107     Vitals:   Vitals:    09/02/21 0723   BP: 128/77   Pulse: 70   Resp: 18   Temp: 97 5 °F (36 4 °C)   SpO2: 99%       Intake/Output:  I/O       08/31 0701 - 09/01 0700 09/01 0701 - 09/02 0700 09/02 0701 - 09/03 0700    P  O  0 120 180    I V  (mL/kg) 1004 2 (20 1) 951 7 (19 1)     Total Intake(mL/kg) 1004 2 (20 1) 1071 7 (21 5) 180 (3 6)    Urine (mL/kg/hr) 1400 (1 2) 200 (0 2)     Stool  0     Total Output 1400 200     Net -395 8 +871 7 +180           Unmeasured Urine Occurrence  2 x     Unmeasured Stool Occurrence  1 x            Nutrition/GI Proph/Bowel Reg:  Continue current diet    Physical Exam:   GENERAL APPEARANCE:  No acute distress  NEURO:  GCS 15  HEENT:  Normocephalic  CV:  Regular rate and rhythm  LUNGS:  CTA bilaterally  GI:  Nontender, nondistended  :  No Her  MSK:  Moving all extremities  SKIN:  Warm, dry intact    Invasive Devices     Peripherally Inserted Central Catheter Line            PICC Line 35/84/81 Right Basilic 5 days          Drain            External Urinary Catheter 5 days                 Lab Results:   Results: I have personally reviewed pertinent reports   , BMP/CMP:   Lab Results   Component Value Date    SODIUM 139 09/02/2021    K 3 6 09/02/2021     (H) 09/02/2021    CO2 23 09/02/2021    BUN 21 09/02/2021    CREATININE 0 54 (L) 09/02/2021    CALCIUM 8 3 09/02/2021    EGFR 84 09/02/2021    and CBC:   Lab Results   Component Value Date    HGB 10 5 (L) 09/02/2021    HCT 32 3 (L) 09/02/2021     Imaging/EKG Studies: Results: I have personally reviewed pertinent reports      Other Studies:  No other studies  VTE Prophylaxis: Eliquis

## 2021-09-02 NOTE — QUICK NOTE
Progress Note - Palliative & Supportive Care     Patient progressing towards discharge  [facility] [place] will arrange outpatient follow up and sign off at this time  Please notify on-call provider if any further questions/concerns      Pato Bernstein,   Palliative and Supportive Care  [phone]

## 2021-09-02 NOTE — WOUND OSTOMY CARE
Progress Note - Wound   Derik Garnett 80 y o  female MRN: [id number]  [facility] Encounter: [id number]        Assessment:   Patient seen this morning for continued wound care  She she is awake, alert, out of bed in chair able to stand for assessment, she is continent of bowel and bladder with needing extensive assist to stand and turning in bed  Patient agreeable for wound care nurse to assess skin and provide care,  at bed side with no questions  Findings  1-R sacro-buttock partially evolving  Evolved area with pink wound bed and mild maceration  Wound is non indurated or with radiating erythema, likely to evolve to stage 2 without complications  Heels are intact, no other skin issues seen  Skin/Wound Care Plan:   1  Apply hydraguard to b/l buttocks and sacrum TID and PRN for prevention and protection   2  Apply skin nourishing cream the entire skin daily for moisture   3  Turn and reposition patient every  2 hours   4  Elevate heels off of bed with pillows to offload pressure   5  Apply EHOB waffle cushion to chair when OOB, if able   6  Nutrition consult   7  Apply Allevyn foam to heels, shubham w/P, peel foam check skin integrity q-shift  Change q3d and PRN for soilage/dislodgement    Vitals: Blood pressure 128/77, pulse 70, temperature 97 5 °F (36 4 °C), resp  rate 18, height 4' 10" (1 473 m), weight 49 9 kg (110 lb 0 2 oz), SpO2 99 %  ,Body mass index is 22 99 kg/m²        Wound 08/26/21 Pressure Injury Sacrum (Active)   Wound Image   09/02/21 1224   Wound Description Light purple 09/02/21 1224   Pressure Injury Stage DTPI 09/02/21 1224   Elham-wound Assessment Intact 09/02/21 1224   Wound Length (cm) 5 cm 09/02/21 1224   Wound Width (cm) 4 5 cm 09/02/21 1224   Wound Depth (cm) 0 cm 08/27/21 0947   Wound Surface Area (cm^2) 22 5 cm^2 09/02/21 1224   Wound Volume (cm^3) 0 cm^3 08/27/21 0947   Calculated Wound Volume (cm^3) 0 cm^3 08/27/21 0947   Tunneling 0 cm 08/27/21 0947   Tunneling in depth located at 0 08/27/21 0947   Undermining 0 08/27/21 0947   Undermining is depth extending from 0 08/27/21 0947   Drainage Amount None 09/02/21 1224   Non-staged Wound Description Not applicable 66/64/12 8588   Treatments Cleansed;Site care 09/02/21 1224   Dressing Foam, Silicon (eg  Allevyn, etc) 09/02/21 1224   Wound packed? No 08/27/21 0947   Packing- # removed 0 08/27/21 0947   Packing- # inserted 0 08/27/21 0947   Patient Tolerance Tolerated well 09/02/21 1224         Our skin/wound care recommendation remains as nursing orders, wound care to continue following, please call or tiger text team with questions and concerns          Luz Pang RN, BSN, [employer]

## 2021-09-02 NOTE — OCCUPATIONAL THERAPY NOTE
Occupational Therapy Progress Note     Patient Name: Leigh Ann Alvarado  BPRCJ'G Date: 9/2/2021  Problem List  Active Problems:    Essential hypertension with goal blood pressure less than 140/90    Hyperlipidemia    Closed wedge compression fractures of T7, T8 vertebra (HCC)    Closed compression fractures of L3, L5 lumbar vertebra, initial encounter ([facility] [address] )    Lower leg DVT (deep venous thromboembolism), acute, left (HCC)    Hypercalcemia    Syncope    [facility]            09/02/21 0932   OT Last Visit   OT Visit Date 09/02/21   Note Type   Note Type Treatment   Restrictions/Precautions   Weight Bearing Precautions Per Order No   Braces or Orthoses TLSO   Other Precautions Chair Alarm; Bed Alarm;Cognitive; Impulsive; Fall Risk;Pain   Lifestyle   Autonomy Per family report patient has had seady progressive decline over last 6 weeks, was in SNF for rehab but did not do well upon returnig home    Reciprocal Relationships , 5 children  Patient's  uses a SPC, he can not provide physical A   Family is supportive but not close by    Service to Others retired    Intrinsic Gratification reports limited intrests in activity, used to like to go shopping   Pain Assessment   Pain Assessment Tool 0-10   Pain Score No Pain   Hospital Pain Intervention(s) Repositioned; Ambulation/increased activity   ADL   Where Assessed Chair   Grooming Assistance 4  Minimal Assistance   Grooming Deficit Setup; Impulsive;Steadying;Verbal cueing;Supervision/safety; Increased time to complete; Teeth care;Brushing hair   Grooming Comments Pt completed oral routine seated in chair with min A  Pt with noted decreased  strength in order to open toothpaste- encouragement to participate and complete task without assistance  Pt required ESCOBAR and handing of toothbrush to Pt  Pt completes hair groominng at S level  UB Dressing Assistance 2  Maximal Assistance   UB Dressing Deficit Setup; Thread RUE; Thread LUE; Increased time to complete;Supervision/safety;Verbal cueing;Steadying; Fasteners   UB Dressing Comments Pt completes donning of TLSO brace setaed on EOB  Max A to don breace and min A to stabilize Pt sitting on EOB  Bed Mobility   Supine to Sit 4  Minimal assistance   Additional items Assist x 1; Increased time required;Verbal cues;LE management;HOB elevated; Bedrails   Sit to Supine Unable to assess   Additional Comments Pt greeted supine in bed and left OOB at end of session with call bell in reach and chair alarm engaged  Transfers   Sit to Stand 3  Moderate assistance   Additional items Assist x 2; Increased time required;Verbal cues   Stand to Sit 3  Moderate assistance   Additional items Assist x 2; Increased time required;Verbal cues   Functional Mobility   Functional Mobility 3  Moderate assistance   Additional Comments Pt completed functional mobility with mod Ax2 with RW  Few steps from bed to chair  Pt with fear of falling and requires encouragement/reassurance to participate  Coordination   Fine Motor Decreased  strength during oral task  In Hand Manipulation Pt with difficulty opening tooth of toothpaste and decreased pinch strength  Cognition   Overall Cognitive Status WFL   Arousal/Participation Alert; Cooperative   Attention Attends with cues to redirect   Orientation Level Oriented to person;Oriented to place; Disoriented to time;Oriented to situation   Memory Decreased long term memory;Decreased recall of precautions   Following Commands Follows one step commands with increased time or repetition   Comments Pt pleasant and cooperative during OT session  Activity Tolerance   Activity Tolerance Patient tolerated treatment well   Medical Staff Made Aware RN cleared and updated on Pt's functional status  Assessment   Assessment Pt greeted bedside for OT treatment on 9/2/2021  Pt was previously evaluated on 8/28/2021 and is making good functional progress towards meeting goals   Pt completes bed mobility with min Ax1 and functional transfers with mod Ax1  Pt completes functional functional mobility with mod Ax2 with RW  Pt performs UB dressing with max A of TLSO brace seated on EOB and seated oral care routine in chair with min A  Limitations that impact functional performance include decreased ADL status, decreased UE ROM, decreased UE strength, decreased safe judgement during ADLs, decreased cognition, decreased endurance, decreased fine motor coordination, decreased self care transfers and decreased high level ADLs  Occupational performance areas to address ADL retraining, functional transfer training, UE strengthening/ROM, endurance training, cognitive reorientation, Pt/caregiver education, equipment evaluation/education, fine motor coordination activities, compensatory technique education, energy conservation and activity engagement   Pt would benefit from continued skilled OT services while in hospital to maximize independence with ADLs  Will continue to follow Pt's goals and progress  Pt would benefit from post acute rehabilitation services upon DC to maximize safety and independence with ADLs and functional tasks of choice  Plan   Treatment Interventions ADL retraining;Functional transfer training;UE strengthening/ROM; Endurance training;Cognitive reorientation;Patient/family training;Equipment evaluation/education; Compensatory technique education; Energy conservation; Activityengagement; Fine motor coordination activities   Goal Expiration Date 09/08/21   OT Treatment Day 1   OT Frequency 3-5x/wk   Recommendation   OT Discharge Recommendation Post acute rehabilitation services   OT - OK to Discharge Yes   Additional Comments  The patient's raw score on the AM-PAC Daily Activity inpatient short form is 13, standardized score is 32 03, less than 39 4  Patients at this level are likely to benefit from discharge to post-acute rehabilitation services   Please refer to the recommendation of the Occupational Therapist for safe discharge planning     AM-PAC Daily Activity Inpatient   Lower Body Dressing 1   Bathing 2   Toileting 2   Upper Body Dressing 2   Grooming 3   Eating 3   Daily Activity Raw Score 13   Daily Activity Standardized Score (Calc for Raw Score >=11) 32 03   AM-PAC Applied Cognition Inpatient   Following a Speech/Presentation 3   Understanding Ordinary Conversation 4   Taking Medications 3   Remembering Where Things Are Placed or Put Away 2   Remembering List of 4-5 Errands 2   Taking Care of Complicated Tasks 2   Applied Cognition Raw Score 16   Applied Cognition Standardized Score 35 03   Modified [place] Scale   Modified [place] Scale 4     Kae Sosa MS, OTR/L

## 2021-09-02 NOTE — QUICK NOTE
We have been following along during patient's hospital course  BM Flow shows plasma cell neoplasm, only 1 3% cells; BMBx specimen results pending- this will be needed to better understand her plasma cell dyscrasia  Given her hypercalcemia (calcium 11 9 on admission, corrected calcium 12 7), she does meet CRABS criteria for MM  I did add a skeletal survey for her to complete her work up, if this cannot be done inpatient, it can be pursued outpatient  Pt is scheduled to see Mena Azar PA-C outpatient on 9/16  I sent her an update to let her know about this patient in advance       MARISA Holder-BC  Hematology/Oncology Nurse Practitioner

## 2021-09-02 NOTE — PLAN OF CARE
Problem: OCCUPATIONAL THERAPY ADULT  Goal: Performs self-care activities at highest level of function for planned discharge setting  See evaluation for individualized goals  Description: Treatment Interventions: ADL retraining, Functional transfer training, UE strengthening/ROM, Endurance training, Cognitive reorientation, Patient/family training, Equipment evaluation/education, Compensatory technique education, Energy conservation, Activityengagement, Fine motor coordination activities          See flowsheet documentation for full assessment, interventions and recommendations  9/2/2021 1349 by Mayelin Ely OT  Outcome: Progressing  Note: Limitation: Decreased ADL status, Decreased UE ROM, Decreased UE strength, Decreased Safe judgement during ADL, Decreased cognition, Decreased endurance, Decreased fine motor control, Decreased self-care trans, Decreased high-level ADLs, Mood limitation  Prognosis: Good  Assessment: Pt greeted bedside for OT treatment on 9/2/2021  Pt was previously evaluated on 8/28/2021 and is making good functional progress towards meeting goals  Pt completes bed mobility with min Ax1 and functional transfers with mod Ax1  Pt completes functional functional mobility with mod Ax2 with RW  Pt performs UB dressing with max A of TLSO brace seated on EOB and seated oral care routine in chair with min A  Limitations that impact functional performance include decreased ADL status, decreased UE ROM, decreased UE strength, decreased safe judgement during ADLs, decreased cognition, decreased endurance, decreased fine motor coordination, decreased self care transfers and decreased high level ADLs   Occupational performance areas to address ADL retraining, functional transfer training, UE strengthening/ROM, endurance training, cognitive reorientation, Pt/caregiver education, equipment evaluation/education, fine motor coordination activities, compensatory technique education, energy conservation and activity engagement   Pt would benefit from continued skilled OT services while in hospital to maximize independence with ADLs  Will continue to follow Pt's goals and progress  Pt would benefit from post acute rehabilitation services upon DC to maximize safety and independence with ADLs and functional tasks of choice  Recommendation: PT consult  OT Discharge Recommendation: Post acute rehabilitation services  OT - OK to Discharge: Yes    9/2/2021 1349 by Altaf Love OT  Note: Limitation: Decreased ADL status, Decreased UE ROM, Decreased UE strength, Decreased Safe judgement during ADL, Decreased cognition, Decreased endurance, Decreased fine motor control, Decreased self-care trans, Decreased high-level ADLs, Mood limitation  Prognosis: Good  Assessment: Pt greeted bedside for OT treatment on 9/2/2021  Pt was previously evaluated on 8/28/2021 and is making good functional progress towards meeting goals  Pt completes bed mobility with min Ax1 and functional transfers with mod Ax1  Pt completes functional functional mobility with mod Ax2 with RW  Pt performs UB dressing with max A of TLSO brace seated on EOB and seated oral care routine in chair with min A  Limitations that impact functional performance include decreased ADL status, decreased UE ROM, decreased UE strength, decreased safe judgement during ADLs, decreased cognition, decreased endurance, decreased fine motor coordination, decreased self care transfers and decreased high level ADLs  Occupational performance areas to address ADL retraining, functional transfer training, UE strengthening/ROM, endurance training, cognitive reorientation, Pt/caregiver education, equipment evaluation/education, fine motor coordination activities, compensatory technique education, energy conservation and activity engagement   Pt would benefit from continued skilled OT services while in hospital to maximize independence with ADLs   Will continue to follow Pt's goals and progress  Pt would benefit from post acute rehabilitation services upon DC to maximize safety and independence with ADLs and functional tasks of choice  Recommendation: PT consult  OT Discharge Recommendation: Post acute rehabilitation services  OT - OK to Discharge: Yes    9/2/2021 1348 by Jose Islas OT  Note: Limitation: Decreased ADL status, Decreased UE ROM, Decreased UE strength, Decreased Safe judgement during ADL, Decreased cognition, Decreased endurance, Decreased fine motor control, Decreased self-care trans, Decreased high-level ADLs, Mood limitation  Prognosis: Good  Assessment: Pt greeted bedside for OT treatment on 9/2/2021  Pt was previously evaluated on 8/28/2021 and is making good functional progress towards meeting goals  Pt completes bed mobility with min Ax1 and functional transfers with mod Ax1  Pt completes functional functional mobility with mod Ax2 with RW  Pt performs UB dressing with max A of TLSO brace seated on EOB and seated oral care routine in chair with min A  Limitations that impact functional performance include decreased ADL status, decreased UE ROM, decreased UE strength, decreased safe judgement during ADLs, decreased cognition, decreased endurance, decreased fine motor coordination, decreased self care transfers and decreased high level ADLs  Occupational performance areas to address ADL retraining, functional transfer training, UE strengthening/ROM, endurance training, cognitive reorientation, Pt/caregiver education, equipment evaluation/education, fine motor coordination activities, compensatory technique education, energy conservation and activity engagement   Pt would benefit from continued skilled OT services while in hospital to maximize independence with ADLs  Will continue to follow Pt's goals and progress  Pt would benefit from post acute rehabilitation services upon DC to maximize safety and independence with ADLs and functional tasks of choice  Recommendation: PT consult  OT Discharge Recommendation: Post acute rehabilitation services  OT - OK to Discharge:  Yes

## 2021-09-02 NOTE — PROGRESS NOTES
[address] [facility] NOTE   Yolis Verduzco 80 y o  female MRN: [id number]  Unit/Bed#: [facility] 810-01 Encounter: [id number]  Reason for Consult:  Hypercalcemia    ASSESSMENT and PLAN:    66-year-old female with back pain recently found to have compression fractures for home nephrology is consulted for hypercalcemia   Ring patient had presented initially with unresponsiveness syncopal episode and went to [facility] and was transferred to [facility] for evaluation due to compression fractures     1) hypercalcemia     -initial CT scan without sign of malignancy  -SPEP-immunofixation with IgG kappa  -UPEP-  -free light chain-6 1 with elevated kappa free chain  -PTH-61  -PTHrP-  -patient received Zometa on August 28th  -initially started on intravenous fluids  -August 30th-calcium improving   Creatinine stable 0 5    -August 31st-creatinine stable 0 6 mg/dL   Calcium stable  Status post bone marrow biopsy  - sept 1st - creat 0 5 mg/dL  [place] stable  -September 2nd-creatinine stable 5  Calcium stable  Electrolytes stable      Plan  -BMP in a m   -from the renal standpoint patient is stable   -I have reached out to Hematology team to discuss the flow results     2) 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     -baseline creatinine 0 9-1 1 mg/dL question a  -urinalysis in June bland  -admission creatinine 2 2 mg/dL  -initially received intravenous fluids  -CT scan without hydronephrosis but bilateral nephrolithiasis  -ARB initially held  -August 31st-creatinine stable  -September 1st-creatinine stable and below baseline (off ARB)  Restarted on ARB per primary team   -September 2nd-stable and below baseline     3) compression fractures     -per primary team     4) acid/base-stable     5) DVT/PE-new-per primary team    -on Eliquis     6) hypertension     -ARB restarted by primary team on 09/01  Hold parameters to tested       7) hypophosphatemia-improved with repletion    8) paraproteinemia-awaiting final Hematology recommendations regarding flow and bone marrow biopsy results which appear to show abnormalities  SUBJECTIVE / 24H INTERVAL HISTORY:    Blood pressure is 557-127 systolic  Urine output not strictly recorded  Patient denies complaints  Watching TV  Sitting in chair      OBJECTIVE:  Current Weight: Weight - Scale: 49 9 kg (110 lb 0 2 oz)  Vitals:    09/01/21 1113 09/01/21 1452 09/01/21 2204 09/02/21 0723   BP: 139/84 138/84 133/78 128/77   Pulse: 95 92 82 70   Resp: 18 18 18 18   Temp: 98 °F (36 7 °C) 98 1 °F (36 7 °C) 97 6 °F (36 4 °C) 97 5 °F (36 4 °C)   TempSrc:       SpO2: 96% 97% 96% 99%   Weight:       Height:           Intake/Output Summary (Last 24 hours) at 9/2/2021 1307  Last data filed at 9/2/2021 1226  Gross per 24 hour   Intake 180 ml   Output 300 ml   Net -120 ml     General: NAD  Skin: no rash  Eyes: anicteric sclera  ENT: moist mucous membrane  Neck: supple  Chest: CTA b/l, no ronchii, no wheeze, no rubs, no rales, limited exam due to brace  CVS: s1s2, no murmur, no gallop, no rub  Abdomen: soft, nontender, nl sounds, in back brace  Extremities:  Trace edema LE b/l, left lower extremity wrapped  : no vilchis  Neuro: AAOX3  Psych: normal affect    Medications:    Current Facility-Administered Medications:     acetaminophen (TYLENOL) tablet 975 mg, 975 mg, Oral, Q8H [address], Chelsie Edouard PA-C, 975 mg at 09/02/21 0522    ALPRAZolam (XANAX) tablet 0 25 mg, 0 25 mg, Oral, BID PRN, Chere Ahumada, DO    apixaban (ELIQUIS) tablet 10 mg, 10 mg, Oral, BID, EUNICE Ríos, 10 mg at 09/02/21 0827    [START ON 9/6/2021] apixaban (ELIQUIS) tablet 5 mg, 5 mg, Oral, BID, EUNICE Ríos    atorvastatin (LIPITOR) tablet 20 mg, 20 mg, Oral, After Dinner, EUNICE Ríos, 20 mg at 09/01/21 1721    dicyclomine (BENTYL) capsule 20 mg, 20 mg, Oral, 4x Daily PRN, Romayne Eriksson Hakim, DO    docusate sodium (COLACE) capsule 100 mg, 100 mg, Oral, BID, Juanita Nina DO, 100 mg at 08/30/21 1730   gabapentin (NEURONTIN) capsule 100 mg, 100 mg, Oral, BID, Juanita Nina DO, 100 mg at 09/02/21 0827    lidocaine (LIDODERM) 5 % patch 2 patch, 2 patch, Topical, Daily, Myrna Fischer PA-C, 2 patch at 09/02/21 0827    losartan (COZAAR) tablet 50 mg, 50 mg, Oral, Daily, EUNICE Magdaleno, 50 mg at 09/02/21 0827    ondansetron (ZOFRAN) injection 4 mg, 4 mg, Intravenous, Q6H PRN, aHrris Buckley MD    oxyCODONE (ROXICODONE) IR tablet 2 5 mg, 2 5 mg, Oral, Q4H PRN, Harris Buckley MD, 2 5 mg at 08/29/21 1952    pantoprazole (PROTONIX) EC tablet 40 mg, 40 mg, Oral, Early Morning, Juanita Nina DO, 40 mg at 09/02/21 0522    polyethylene glycol (MIRALAX) packet 17 g, 17 g, Oral, Daily, Juanita Nina DO, 17 g at 08/30/21 0852    sucralfate (CARAFATE) tablet 1 g, 1 g, Oral, 4x Daily (AC & HS), Juanita Nina DO, 1 g at 09/02/21 1107    Laboratory Results:  Results from last 7 days   Lab Units 09/02/21  1009 09/02/21  0512 09/01/21  0515 09/01/21  0514 08/31/21  0452 08/30/21  0549 08/29/21  1153 08/28/21  1046 08/27/21  1515   WBC Thousand/uL  --   --  14 32*  --  10 00  --   --  10 52* 12 09*   HEMOGLOBIN g/dL 10 5*  --  9 6*  --  8 9*  --   --  11 2* 11 3*   HEMATOCRIT % 32 3*  --  28 9*  --  27 2*  --   --  33 9* 34 2*   PLATELETS Thousands/uL  --   --  242  --  185  --   --  198 187   POTASSIUM mmol/L  --  3 6  --  3 4* 3 8 3 9 3 5 3 0* 3 3*   CHLORIDE mmol/L  --  112*  --  111* 110* 110* 111* 107 105   CO2 mmol/L  --  23  --  24 24 23 22 21 24   BUN mg/dL  --  21  --  19 30* 24 18 24 30*   CREATININE mg/dL  --  0 54*  --  0 52* 0 59* 0 58* 0 49* 0 56* 0 74   CALCIUM mg/dL  --  8 3  --  8 0* 8 5 8 7 8 9 10 3* 10 6*   MAGNESIUM mg/dL  --   --   --   --   --   --   --   --  1 9   PHOSPHORUS mg/dL  --   --   --   --   --  2 3 1 6* 1 3* 1 8*

## 2021-09-02 NOTE — PLAN OF CARE
Problem: Potential for Falls  Goal: Patient will remain free of falls  Description: INTERVENTIONS:  - Educate patient/family on patient safety including physical limitations  - Instruct patient to call for assistance with activity   - Consult OT/PT to assist with strengthening/mobility   - Keep Call bell within reach  - Keep bed low and locked with side rails adjusted as appropriate  - Keep care items and personal belongings within reach  - Initiate and maintain comfort rounds  - Make Fall Risk Sign visible to staff  - Offer Toileting every 2 Hours, in advance of need  - Initiate/Maintain bed alarm  - Obtain necessary fall risk management equipment: yellow nonslip socks  - Apply yellow socks and bracelet for high fall risk patients  - Consider moving patient to room near nurses station  Outcome: Progressing     Problem: NEUROSENSORY - ADULT  Goal: Achieves stable or improved neurological status  Description: INTERVENTIONS  - Monitor and report changes in neurological status  - Monitor vital signs such as temperature, blood pressure, glucose, and any other labs ordered   - Initiate measures to prevent increased intracranial pressure  - Monitor for seizure activity and implement precautions if appropriate      Outcome: Progressing     Problem: METABOLIC, FLUID AND ELECTROLYTES - ADULT  Goal: Electrolytes maintained within normal limits  Description: INTERVENTIONS:  - Monitor labs and assess patient for signs and symptoms of electrolyte imbalances  - Administer electrolyte replacement as ordered  - Monitor response to electrolyte replacements, including repeat lab results as appropriate  - Instruct patient on fluid and nutrition as appropriate  Outcome: Progressing     Problem: SKIN/TISSUE INTEGRITY - ADULT  Goal: Skin Integrity remains intact(Skin Breakdown Prevention)  Description: Assess:  -Assess extremities for adequate circulation and sensation     Bed Management:  -Have minimal linens on bed & keep smooth, unwrinkled  -Change linens as needed when moist or perspiring  Toileting:     Problem: MOBILITY - ADULT  Goal: Maintain or return to baseline ADL function  Description: INTERVENTIONS:  -  Assess patient's ability to carry out ADLs; assess patient's baseline for ADL function and identify physical deficits which impact ability to perform ADLs (bathing, care of mouth/teeth, toileting, grooming, dressing, etc )  - Assess/evaluate cause of self-care deficits   - Assess range of motion  - Assess patient's mobility; develop plan if impaired  - Assess patient's need for assistive devices and provide as appropriate  - Encourage maximum independence but intervene and supervise when necessary  - Involve family in performance of ADLs  - Assess for home care needs following discharge   - Consider OT consult to assist with ADL evaluation and planning for discharge  - Provide patient education as appropriate  Outcome: Progressing  Goal: Maintains/Returns to pre admission functional level  Description: INTERVENTIONS:  - Perform BMAT or MOVE assessment daily    - Set and communicate daily mobility goal to care team and patient/family/caregiver  - Collaborate with rehabilitation services on mobility goals if consulted  - Perform Range of Motion 3 times a day  - Reposition patient every 2 hours    - Dangle patient 3 times a day  - Stand patient 3 times a day  - Ambulate patient 3 times a day  - Out of bed to chair 3 times a day   - Out of bed for meals 3 times a day  - Out of bed for toileting  - Record patient progress and toleration of activity level   Outcome: Progressing     Problem: Prexisting or High Potential for Compromised Skin Integrity  Goal: Skin integrity is maintained or improved  Description: INTERVENTIONS:  - Identify patients at risk for skin breakdown  - Assess and monitor skin integrity  - Assess and monitor nutrition and hydration status  - Monitor labs   - Assess for incontinence   - Turn and reposition patient  - Assist with mobility/ambulation  - Relieve pressure over bony prominences  - Avoid friction and shearing  - Provide appropriate hygiene as needed including keeping skin clean and dry  - Evaluate need for skin moisturizer/barrier cream  - Collaborate with interdisciplinary team   - Patient/family teaching  - Consider wound care consult   Outcome: Progressing     Problem: Knowledge Deficit  Goal: Patient/family/caregiver demonstrates understanding of disease process, treatment plan, medications, and discharge instructions  Description: Complete learning assessment and assess knowledge base  Interventions:  - Provide teaching at level of understanding  - Provide teaching via preferred learning methods  Outcome: Progressing     Problem: Nutrition/Hydration-ADULT  Goal: Nutrient/Hydration intake appropriate for improving, restoring or maintaining nutritional needs  Description: Monitor and assess patient's nutrition/hydration status for malnutrition  Collaborate with interdisciplinary team and initiate plan and interventions as ordered  Monitor patient's weight and dietary intake as ordered or per policy  Utilize nutrition screening tool and intervene as necessary  Determine patient's food preferences and provide high-protein, high-caloric foods as appropriate       INTERVENTIONS:  - Monitor oral intake, urinary output, labs, and treatment plans  - Assess nutrition and hydration status and recommend course of action  - Evaluate amount of meals eaten  - Assist patient with eating if necessary   - Allow adequate time for meals  - Recommend/ encourage appropriate diets, oral nutritional supplements, and vitamin/mineral supplements  - Order, calculate, and assess calorie counts as needed  - Recommend, monitor, and adjust tube feedings and TPN/PPN based on assessed needs  - Assess need for intravenous fluids  - Provide specific nutrition/hydration education as appropriate  - Include patient/family/caregiver in decisions related to nutrition  Outcome: Progressing

## 2021-09-03 PROBLEM — E87.6 HYPOKALEMIA: Status: ACTIVE | Noted: 2021-09-03

## 2021-09-03 LAB
ANION GAP SERPL CALCULATED.3IONS-SCNC: 4 MMOL/L (ref 4–13)
BUN SERPL-MCNC: 26 MG/DL (ref 5–25)
CALCIUM SERPL-MCNC: 7.8 MG/DL (ref 8.3–10.1)
CHLORIDE SERPL-SCNC: 112 MMOL/L (ref 100–108)
CO2 SERPL-SCNC: 23 MMOL/L (ref 21–32)
CREAT SERPL-MCNC: 0.63 MG/DL (ref 0.6–1.3)
GFR SERPL CREATININE-BSD FRML MDRD: 80 ML/MIN/1.73SQ M
GLUCOSE SERPL-MCNC: 75 MG/DL (ref 65–140)
POTASSIUM SERPL-SCNC: 3.3 MMOL/L (ref 3.5–5.3)
SARS-COV-2 RNA RESP QL NAA+PROBE: NEGATIVE
SODIUM SERPL-SCNC: 139 MMOL/L (ref 136–145)

## 2021-09-03 PROCEDURE — 80048 BASIC METABOLIC PNL TOTAL CA: CPT | Performed by: INTERNAL MEDICINE

## 2021-09-03 PROCEDURE — 99232 SBSQ HOSP IP/OBS MODERATE 35: CPT | Performed by: PHYSICIAN ASSISTANT

## 2021-09-03 PROCEDURE — 99232 SBSQ HOSP IP/OBS MODERATE 35: CPT | Performed by: SURGERY

## 2021-09-03 PROCEDURE — U0005 INFEC AGEN DETEC AMPLI PROBE: HCPCS | Performed by: PHYSICIAN ASSISTANT

## 2021-09-03 PROCEDURE — 99232 SBSQ HOSP IP/OBS MODERATE 35: CPT | Performed by: INTERNAL MEDICINE

## 2021-09-03 PROCEDURE — U0003 INFECTIOUS AGENT DETECTION BY NUCLEIC ACID (DNA OR RNA); SEVERE ACUTE RESPIRATORY SYNDROME CORONAVIRUS 2 (SARS-COV-2) (CORONAVIRUS DISEASE [COVID-19]), AMPLIFIED PROBE TECHNIQUE, MAKING USE OF HIGH THROUGHPUT TECHNOLOGIES AS DESCRIBED BY CMS-2020-01-R: HCPCS | Performed by: PHYSICIAN ASSISTANT

## 2021-09-03 RX ORDER — POTASSIUM CHLORIDE 14.9 MG/ML
20 INJECTION INTRAVENOUS ONCE
Status: COMPLETED | OUTPATIENT
Start: 2021-09-03 | End: 2021-09-03

## 2021-09-03 RX ADMIN — LOSARTAN POTASSIUM 50 MG: 50 TABLET, FILM COATED ORAL at 08:51

## 2021-09-03 RX ADMIN — ATORVASTATIN CALCIUM 20 MG: 20 TABLET, FILM COATED ORAL at 17:37

## 2021-09-03 RX ADMIN — SUCRALFATE 1 G: 1 TABLET ORAL at 17:38

## 2021-09-03 RX ADMIN — ACETAMINOPHEN 975 MG: 325 TABLET, FILM COATED ORAL at 06:03

## 2021-09-03 RX ADMIN — GABAPENTIN 100 MG: 100 CAPSULE ORAL at 17:38

## 2021-09-03 RX ADMIN — PANTOPRAZOLE SODIUM 40 MG: 40 TABLET, DELAYED RELEASE ORAL at 06:03

## 2021-09-03 RX ADMIN — GABAPENTIN 100 MG: 100 CAPSULE ORAL at 08:51

## 2021-09-03 RX ADMIN — SUCRALFATE 1 G: 1 TABLET ORAL at 22:11

## 2021-09-03 RX ADMIN — LIDOCAINE 2 PATCH: 50 PATCH TOPICAL at 08:52

## 2021-09-03 RX ADMIN — APIXABAN 10 MG: 5 TABLET, FILM COATED ORAL at 08:51

## 2021-09-03 RX ADMIN — ACETAMINOPHEN 975 MG: 325 TABLET, FILM COATED ORAL at 22:11

## 2021-09-03 RX ADMIN — APIXABAN 10 MG: 5 TABLET, FILM COATED ORAL at 17:38

## 2021-09-03 RX ADMIN — SUCRALFATE 1 G: 1 TABLET ORAL at 06:03

## 2021-09-03 RX ADMIN — OXYCODONE HYDROCHLORIDE 2.5 MG: 5 TABLET ORAL at 11:13

## 2021-09-03 RX ADMIN — SUCRALFATE 1 G: 1 TABLET ORAL at 11:13

## 2021-09-03 RX ADMIN — POTASSIUM CHLORIDE 20 MEQ: 14.9 INJECTION, SOLUTION INTRAVENOUS at 08:52

## 2021-09-03 NOTE — QUICK NOTE
Discussed at bedside today with patient and patient's  regarding the diagnosis of multiple myeloma  This was confirmed with pathology as well as discussion with the Hematology/Oncology team   Discussed at bedside the diagnosis and next steps  Hematology Oncology came in the room after my conversation to discuss any other questions the family would have  Patient is still appropriate for discharge; currently ascertaining placement

## 2021-09-03 NOTE — PROGRESS NOTES
[facility]  Progress Note - Gershon Boas 10/15/1931, 80 y o  female MRN: [id number]  Unit/Bed#: [facility] 810-01 Encounter: [id number]  Primary Care Provider: Marina Benavidez MD   Date and time admitted to hospital: 8/26/2021  2:10 AM    Hypokalemia  Assessment & Plan  - replete today  - follow-up a m  Labs    Syncope  Assessment & Plan  · Family reports patient syncopized thing while in a chair  · Echo demonstrated EF of 60%  · EKG/troponin--negative  · Cardiology consulted--believed to be related to dehydration  No further workup indicated at this time  · Patient has been asymptomatic since admission  Hypercalcemia  Assessment & Plan  · Corrected calcium was 13 1 on presentation  · Nephrology consulted and recommendations appreciated  Calcium level stable  · Concern for multiple myeloma  · Heme Onc consulted, pending bone marrow biopsy results pending    Lower leg DVT (deep venous thromboembolism), acute, left (HCC)  Assessment & Plan  · Family reports patient being largely immobile for the past 3 weeks  · Patient has decreased pulses in left lower leg  Patient is noted to have swelling in LLE  · Duplex completed and DVT was reported, now with evidence of PE  · Heparin drip discontinued, patient was started on Eliquis  · Appreciate vascular surgery recommendations    Closed compression fractures of L3, L5 lumbar vertebra, initial encounter [facility])  Assessment & Plan  · No reported trauma  · Neurologically intact  · Neurosurgery consulted, note appreciated  · TLSO brace ordered  · Upright XR completed  · Multimodal pain regimen  · PT/OT-rehab    Closed wedge compression fractures of T7, T8 vertebra (HCC)  Assessment & Plan  · No reported trauma  · Neurologically intact  · Neurosurgery consulted, note appreciated    · TLSO brace ordered  · Upright XR completed  · Multimodal pain regimen  · PT/OT-rehab    Hyperlipidemia  Assessment & Plan  · Hold home Lipitor initially held due to kidney functioning  · Restarted home atorvastatin    Essential hypertension with goal blood pressure less than 140/90  Assessment & Plan  · Cozaar was originally held due to STEPHANIE  · Nephrology consult  STEPHANIE resolved  · Restarted on Cozaar  * Pulmonary embolism (HCC)  Assessment & Plan  -LLE extensive DVT with resulting RLL lobar and segmental PE  -Troponin negative, BNP mildly elevated but less than it had been previously  -heparin drip will be stopped today  Patient will be transition to Eliquis   -Patient continues to deny any shortness of breath, chest pain, lightheadedness/dizziness     DVT prophylaxis: SCDs and Eliquis  PT and OT: eval and treat    Disposition: Will discuss with family at bedside regarding biopsy results  Will need outpatient follow-up  Stable for discharge at this time  SUBJECTIVE:  Chief Complaint: "No new complaints "    Subjective: Patient is offering no new complaints today on presentation  States that her pain is well controlled  Reports she did not sleep well last night   Otherwise doing well and wanting to eat breakfast        OBJECTIVE:     Meds/Allergies     Current Facility-Administered Medications:     acetaminophen (TYLENOL) tablet 975 mg, 975 mg, Oral, Q8H [address], Chelsie Edouard PA-C, 975 mg at 09/03/21 0603    ALPRAZolam (XANAX) tablet 0 25 mg, 0 25 mg, Oral, BID PRN, Alber Khan DO    apixaban (ELIQUIS) tablet 10 mg, 10 mg, Oral, BID, Gina Haider, CRNP, 10 mg at 09/02/21 1700    [START ON 9/6/2021] apixaban (ELIQUIS) tablet 5 mg, 5 mg, Oral, BID, Gina Haider, CRNP    atorvastatin (LIPITOR) tablet 20 mg, 20 mg, Oral, After Dinner, Gina Haider, CRNP, 20 mg at 09/02/21 1700    dicyclomine (BENTYL) capsule 20 mg, 20 mg, Oral, 4x Daily PRN, Kevyn Colem, DO    docusate sodium (COLACE) capsule 100 mg, 100 mg, Oral, BID, Juanita Hakim, DO, 100 mg at 08/30/21 1730    gabapentin (NEURONTIN) capsule 100 mg, 100 mg, Oral, BID, Juanita Hakim, DO, 100 mg at 09/02/21 1700    lidocaine (LIDODERM) 5 % patch 2 patch, 2 patch, Topical, Daily, Chelsie Edouard PA-C, 2 patch at 09/02/21 0827    losartan (COZAAR) tablet 50 mg, 50 mg, Oral, Daily, Abbie Hernandes MD    ondansetron [facility]) injection 4 mg, 4 mg, Intravenous, Q6H PRN, Sugey Bauman MD    oxyCODONE (ROXICODONE) IR tablet 2 5 mg, 2 5 mg, Oral, Q4H PRN, Karrie Templeton MD, 2 5 mg at 09/02/21 1658    pantoprazole (PROTONIX) EC tablet 40 mg, 40 mg, Oral, Early Morning, Juanita Nina DO, 40 mg at 09/03/21 0603    polyethylene glycol (MIRALAX) packet 17 g, 17 g, Oral, Daily, Juanita Nina DO, 17 g at 08/30/21 0815    potassium chloride 20 mEq IVPB (premix), 20 mEq, Intravenous, Once, Brooklyn Yancey PA-C    sucralfate (CARAFATE) tablet 1 g, 1 g, Oral, 4x Daily (AC & HS), Sanaz Ghosh DO, 1 g at 09/03/21 0603     Vitals:   Vitals:    09/03/21 0813   BP: 145/80   Pulse: 86   Resp: 18   Temp: 98 4 °F (36 9 °C)   SpO2: 96%       Intake/Output:  I/O       09/01 0701 - 09/02 0700 09/02 0701 - 09/03 0700 09/03 0701 - 09/04 0700    P  O  120 360     I V  (mL/kg) 951 7 (19 1)      Total Intake(mL/kg) 1071 7 (21 5) 360 (7 2)     Urine (mL/kg/hr) 200 (0 2) 100 (0 1)     Stool 0 0     Total Output 200 100     Net +871 7 +260            Unmeasured Urine Occurrence 2 x      Unmeasured Stool Occurrence 1 x 1 x            Nutrition/GI Proph/Bowel Reg: continue current diet    Physical Exam:   GENERAL APPEARANCE: NAD  NEURO: GCS 15  HEENT: normocephalic  CV: RRR  LUNGS: CTA b/l  GI: non-tender, non-distended  : no vilchis  MSK: Moving all extremities  SKIN: warm, dry, intact    Invasive Devices     Peripherally Inserted Central Catheter Line            PICC Line 28/20/32 Right Basilic 6 days          Drain            External Urinary Catheter 6 days                 Lab Results:   Results: I have personally reviewed pertinent reports   , BMP/CMP:   Lab Results   Component Value Date    SODIUM 139 09/03/2021    K 3 3 (L) 09/03/2021     (H) 09/03/2021    CO2 23 09/03/2021    BUN 26 (H) 09/03/2021    CREATININE 0 63 09/03/2021    CALCIUM 7 8 (L) 09/03/2021    EGFR 80 09/03/2021    and CBC:   Lab Results   Component Value Date    HGB 10 5 (L) 09/02/2021    HCT 32 3 (L) 09/02/2021     Imaging/EKG Studies: Results: I have personally reviewed pertinent reports      Other Studies: no other studies  VTE Prophylaxis: SCDs and Eliquis

## 2021-09-03 NOTE — CASE MANAGEMENT
Pt accepted to [address] [place] [address] for IP rehab  This is patient and family's first choice  Pt will need a covid swab prior to d/c   Cm will follow up with family and medical team about d/c time

## 2021-09-03 NOTE — PROGRESS NOTES
Medical Oncology/Hematology Consult Note  Lis Duke, female, 80 y o , 10/15/1931,  PPHP 810/PPHP 810-01, [phone]     Reason for admission:  Syncopal episode  Reason for consultation: IgG kappa monoclonal gammopathy, PE with right heart strain      ASSESSMENT AND PLAN:     1  Multiple myeloma - IgG [place]   Labs / Imaging reviewed:   Serum immunofixation - monoclonal gammopathy IgG kappa 1 44  SPEP - M spike 1 44, total protein 7 3  Serum FLC -  Ig Kappa 122 8, Ig lambda 20 0, Ratio 6 14  IgG elevated 1630, IgA 185, IgM 46    Beta 2 microglobulin - 1 5  Creatinine - 0 56  Corrected calcium - 11 6  CT CAP wo contrast 8/25: moderate wedge fracture T7, L3, L5  Severe new wedge deformity T8  No retropulsion   Bone Marrow Biopsy: plasma cell neoplasm with kappa light chain-restriced 12-13%    · With plasma cell concentration >10% and components of crab criteria  · Recommend low-dose for body CT scan as outpatient   · Appointment scheduled as outpatient with Salvador Gonzalez PA-C  · Consulted palliative medicine for pain management of chronic wedge compression in thoracic and lumbar spine - appreciate recommendations  · Discussed diagnosis with  and patient in room -  also has MM and is treated by Dr Ai Hernandez as outpatient  2  Hypercalcemia  · Management per primary team and nephrology   · Appreciate nephrology input, recommended zometa 3 mg IV previously     3  Lobar and segmental pulmonary emboli RLL   CTA 8/26 demonstrated lobar and segmental PE RLL with right heart strain    VAS lower limb venous duplex 8/26: acute occlusive thrombus left posterior tibial veins through common femoral vein   Eliquis initiated for anticoagulation therapy     Patient understands and is in agreement with this plan  Thank you for the opportunity to participate in this patient's care  Interval History:  Patient overall feeling well, poor historian    Able to speak with  aianival room -  planning on updating family members   is known to Dr Dandy Cintron office for multiple myeloma treatment and is familiar with the condition  Patient is not on estrogen/progesterone therapy, never a smoker, no recent surgeries or travel  Patient has been sedentary for last month due to severe back pain likely provoking/contributing to PE/DVT  No personal or family history of malignancy  No personal or family history of bleeding/clotting or associated disorders  Currently has visiting nurses and pt coming to home to work with patient  Discussed case management/ assistance if needed with this  Likely going to SN    History of present illness:  Patient is 80 female past medical history of HLD, HTN, STEPHANIE on stage III CKD, MDD who presented to Emergency Department on 08/25 due to syncopal episode at home head trauma unknown not on anticoagulation at the time  The patient cannot recall symptoms before syncopal event and has poor recollection of the event overall  Complaints of abdominal discomfort likely related to fecal impaction  Thorough evaluation by Nephrology with SPEP and FLC concerning for active multiple myeloma due to hypercalcemia, anemia, renal dysfunction  Concern for related T7, T8, L3, L5 wedge compression fractures  Imaging 8/26/21:  VAS lower limb venous duplex: acute occlusive thrombus left posterior tibial veins through common femoral vein  CTA: lobar and segmental PE RLL with right heart strain     Imaging 8/25/21:  CT head wo contrast: no intracranial abnormality  CT spine cervical wo contrast: no cervical spine fracture or malaglingment, degenerative disc changes C3 - C7   CT CAP wo contrast: moderate wedge fracture deformity T7, L3, L5, severe new wedge deformity T8, stool impaction     Review of Systems:   Review of Systems   Constitutional: Negative for chills, fever and unexpected weight change  HENT: Negative for ear pain and sore throat      Eyes: Negative for pain and visual disturbance  Respiratory: Negative for cough and shortness of breath  Cardiovascular: Negative for chest pain and palpitations  Gastrointestinal: Positive for abdominal pain and constipation  Negative for abdominal distention, diarrhea, nausea and vomiting  Genitourinary: Negative for dysuria and hematuria  Musculoskeletal: Positive for arthralgias and back pain  Skin: Negative for color change and rash  Neurological: Negative for seizures and syncope  Hematological: Negative for adenopathy  Does not bruise/bleed easily  All other systems reviewed and are negative  PHYSICAL EXAM:    /80 (BP Location: Left arm)   Pulse 86   Temp 98 4 °F (36 9 °C) (Oral)   Resp 18   Ht 4' 10" (1 473 m)   Wt 49 9 kg (110 lb 0 2 oz)   SpO2 96%   BMI 22 99 kg/m²     Physical Exam  Vitals and nursing note reviewed  Constitutional:       General: She is not in acute distress  Appearance: Normal appearance  She is not ill-appearing  HENT:      Head: Normocephalic and atraumatic  Eyes:      General: No scleral icterus  Conjunctiva/sclera: Conjunctivae normal    Cardiovascular:      Rate and Rhythm: Normal rate and regular rhythm  Pulses: Normal pulses  Heart sounds: No murmur heard  Pulmonary:      Effort: Pulmonary effort is normal       Breath sounds: Normal breath sounds  No wheezing or rhonchi  Abdominal:      General: Abdomen is flat  Bowel sounds are normal       Palpations: Abdomen is soft  Tenderness: There is no abdominal tenderness  There is no guarding or rebound  Musculoskeletal:         General: Tenderness (back) present  Cervical back: Normal range of motion and neck supple  Right lower leg: No edema  Left lower leg: No edema  Lymphadenopathy:      Cervical: No cervical adenopathy  Skin:     General: Skin is warm and dry  Capillary Refill: Capillary refill takes less than 2 seconds  Findings: No bruising  Neurological:      Mental Status: She is alert and oriented to person, place, and time           LABS:     Recent Results (from the past 48 hour(s))   Basic metabolic panel    Collection Time: 21  5:12 AM   Result Value Ref Range    Sodium 139 136 - 145 mmol/L    Potassium 3 6 3 5 - 5 3 mmol/L    Chloride 112 (H) 100 - 108 mmol/L    CO2 23 21 - 32 mmol/L    ANION GAP 4 4 - 13 mmol/L    BUN 21 5 - 25 mg/dL    Creatinine 0 54 (L) 0 60 - 1 30 mg/dL    Glucose 71 65 - 140 mg/dL    Calcium 8 3 8 3 - 10 1 mg/dL    eGFR 84 ml/min/1 73sq m   Hemoglobin and hematocrit, blood    Collection Time: 21 10:09 AM   Result Value Ref Range    Hemoglobin 10 5 (L) 11 5 - 15 4 g/dL    Hematocrit 32 3 (L) 34 8 - 46 1 %   Basic metabolic panel    Collection Time: 21  4:57 AM   Result Value Ref Range    Sodium 139 136 - 145 mmol/L    Potassium 3 3 (L) 3 5 - 5 3 mmol/L    Chloride 112 (H) 100 - 108 mmol/L    CO2 23 21 - 32 mmol/L    ANION GAP 4 4 - 13 mmol/L    BUN 26 (H) 5 - 25 mg/dL    Creatinine 0 63 0 60 - 1 30 mg/dL    Glucose 75 65 - 140 mg/dL    Calcium 7 8 (L) 8 3 - 10 1 mg/dL    eGFR 80 ml/min/1 73sq m   Novel Coronavirus (Covid-19),PCR [facility]    Collection Time: 21  1:17 PM    Specimen: Nose; Nares   Result Value Ref Range    SARS-CoV-2 Negative Negative       Echo complete with contrast if indicated    Result Date: 2021  Narrative: [address] [phone] Transthoracic Echocardiogram 2D, M-mode, Doppler, and Color Doppler Study date:  26-Aug-2021 Patient: Humberto Orellana MR number: [id number] Account number: [de-identified] : 15-Oct-1931 Age: 80 years Gender: Female Status: Outpatient Location: Emergency room Height: 58 in Weight: 109 8 lb BP: 138/ 63 mmHg Indications: Syncope Diagnoses: R55  - Syncope and collapse Sonographer:  Thalia Moreno RDCS Primary Physician:  Denise Lynch MD Referring Physician:  Fiordaliza Abraham DO Group:  [facility] Cardiology Fellow: Aubrie Marie MD Interpreting Physician:  Abisai Kimbrough MD SUMMARY PROCEDURE INFORMATION: This was a technically difficult study  LEFT VENTRICLE: Systolic function was normal  Ejection fraction was estimated to be 60 %  There were no regional wall motion abnormalities  MITRAL VALVE: There was moderate annular calcification  There was mild to moderate regurgitation  TRICUSPID VALVE: There was trace regurgitation  PULMONIC VALVE: There was trace regurgitation  HISTORY: PRIOR HISTORY: Syncope PROCEDURE: The procedure was performed in the emergency room  This was a routine study  The transthoracic approach was used  The study included complete 2D imaging, M-mode, complete spectral Doppler, and color Doppler  The heart rate was 84 bpm, at the start of the study  Images were obtained from the parasternal, apical, subcostal, and suprasternal notch acoustic windows  Echocardiographic views were limited due to restricted patient mobility, poor patient compliance, and lung interference  This was a technically difficult study  LEFT VENTRICLE: Size was normal  Systolic function was normal  Ejection fraction was estimated to be 60 %  There were no regional wall motion abnormalities  Wall thickness was normal  No evidence of apical thrombus  DOPPLER: Left ventricular diastolic function parameters were normal  RIGHT VENTRICLE: The size was normal  Systolic function was normal  Wall thickness was normal  LEFT ATRIUM: Size was normal  RIGHT ATRIUM: Size was normal  MITRAL VALVE: There was moderate annular calcification  Valve structure was normal  There was normal leaflet separation  DOPPLER: The transmitral velocity was within the normal range  There was no evidence for stenosis  There was mild to moderate regurgitation  AORTIC VALVE: The valve was trileaflet  Leaflets exhibited mildly increased thickness, mild calcification, and normal cuspal separation   DOPPLER: Transaortic velocity was within the normal range  There was no evidence for stenosis  There was no significant regurgitation  TRICUSPID VALVE: Not well visualized  DOPPLER: The transtricuspid velocity was within the normal range  There was no evidence for stenosis  There was trace regurgitation  The tricuspid jet envelope definition was inadequate for estimation of RV systolic pressure  There are no indirect findings which would suggest moderate or severe pulmonary hypertension  PULMONIC VALVE: Not well visualized  DOPPLER: The transpulmonic velocity was within the normal range  There was trace regurgitation  PERICARDIUM: There was no pericardial effusion  The pericardium was normal in appearance  AORTA: The root exhibited normal size  SYSTEMIC VEINS: IVC: The inferior vena cava was normal in size  Respirophasic changes were normal  SYSTEM MEASUREMENT TABLES 2D %FS: 32 68 % Ao Diam: 2 83 cm Ao asc: 3 08 cm EDV(Teich): 31 55 ml EF(Teich): 62 88 % ESV(Teich): 11 71 ml IVSd: 1 39 cm LA Diam: 2 52 cm LAAs A4C: 7 74 cm2 LAESV A-L A4C: 14 13 ml LAESV MOD A4C: 13 63 ml LALs A4C: 3 6 cm LVEDV MOD A4C: 20 52 ml LVEF MOD A4C: 55 35 % LVESV MOD A4C: 9 16 ml LVIDd: 2 88 cm LVIDs: 1 94 cm LVLd A4C: 5 26 cm LVLs A4C: 4 46 cm LVPWd: 0 69 cm RAEDV A-L: 11 24 ml RAEDV MOD: 10 47 ml RALd: 3 75 cm RVIDd: 3 04 cm SV MOD A4C: 11 36 ml SV(Teich): 19 84 ml CW AV Env  Ti: 233 68 ms AV VTI: 18 64 cm AV Vmax: 1 18 m/s AV Vmean: 0 8 m/s AV maxP 61 mmHg AV meanP 95 mmHg MM TAPSE: 1 41 cm PW LVOT Env  Ti: 238 25 ms LVOT VTI: 15 86 cm LVOT Vmax: 1 m/s LVOT Vmean: 0 67 m/s LVOT maxPG: 3 98 mmHg LVOT meanP 05 mmHg MV A Rajesh: 1 11 m/s MV Dec [place]: 2 53 m/s2 MV DecT: 200 61 ms MV E Rajesh: 0 51 m/s MV E/A Ratio: 0 46 MV PHT: 58 18 ms MVA By PHT: 3 78 cm2 [employer]tersocietal Commission Accredited Echocardiography Laboratory Prepared and electronically signed by Og Atkins MD Signed 26-Aug-2021 15:56:33     EGD    Result Date: 2021  Narrative: [id number] [facility] End[facility] [address] [facility] [place] [address] [phone] DATE OF SERVICE: 8/02/21 PHYSICIAN(S): Shantal Aals DO - Attending Physician INDICATION: Generalized abdominal pain, Chronic idiopathic constipation, Abnormal CT of the abdomen, Gastroesophageal reflux disease, unspecified whether esophagitis present POST-OP DIAGNOSIS: See the impression below  PREPROCEDURE: Informed consent was obtained for the procedure, including sedation  Risks of perforation, hemorrhage, adverse drug reaction and aspiration were discussed  The patient was placed in the left lateral decubitus position  Patient was explained about the risks and benefits of the procedure  Risks including but not limited to bleeding, infection, and perforation were explained in detail  Also explained about less than 100% sensitivity with the exam and other alternatives  DETAILS OF PROCEDURE: Patient was taken to the procedure room where a time out was performed to confirm correct patient and correct procedure  The patient underwent monitored anesthesia care, which was administered by an anesthesia professional  The patient's blood pressure, heart rate, level of consciousness, respirations and oxygen were monitored throughout the procedure  The scope was advanced to the second part of the duodenum  Retroflexion was performed in the fundus  The patient experienced no blood loss  The procedure was not difficult  The patient tolerated the procedure well  There were no apparent complications  ANESTHESIA INFORMATION: ASA: III Anesthesia Type: IV Sedation with Anesthesia MEDICATIONS: No administrations occurring from 1255 to 1306 on 08/02/21 FINDINGS: The cricopharynx, upper third of the esophagus, middle third of the esophagus, lower third of the esophagus, GE junction and Z-line appeared normal  Z-line is 27 cm from the incisors   3 cm sliding hiatal hernia (type I hiatal hernia) without Resendez Toña lesions present - GE junction 27 cm from the incisors, diaphragmatic impression 30 cm from the incisors The body of the stomach, antrum, prepyloric region and pylorus appeared normal  The duodenal bulb and 2nd part of the duodenum appeared normal  SPECIMENS: * No specimens in log *     Impression: 1  Small sliding hiatal hernia, but otherwise normal EGD RECOMMENDATION: There is no recommended follow-up for this procedure  Deedee Tenorio DO, [place], [place]     Colonoscopy    Result Date: 8/2/2021  Narrative:  [id number] [facility] Endoscopy [address] [facility] [address] [phone] DATE OF SERVICE: 8/02/21 PHYSICIAN(S): Deedee Tenorio DO - Attending Physician INDICATION: Generalized abdominal pain, Chronic idiopathic constipation, Abnormal CT of the abdomen, Gastroesophageal reflux disease, unspecified whether esophagitis present Colonoscopy performed for a diagnostic indication  POST-OP DIAGNOSIS: See the impression below  HISTORY: Prior colonoscopy: 10 years ago  BOWEL PREPARATION: Miralax/Dulcolax PREPROCEDURE: Informed consent was obtained for the procedure, including sedation  Risks including but not limited to bleeding, infection, perforation, adverse drug reaction and aspiration were explained in detail  Also explained about less than 100% sensitivity with the exam and other alternatives  The patient was placed in the left lateral decubitus position  DETAILS OF PROCEDURE: Patient was taken to the procedure room where a time out was performed to confirm correct patient and correct procedure  The patient underwent monitored anesthesia care, which was administered by an anesthesia professional  The patient's blood pressure, heart rate, level of consciousness, oxygen and respirations were monitored throughout the procedure  A digital rectal exam was performed  The scope was introduced through the anus and advanced to the cecum  Retroflexion was performed in the rectum   The quality of bowel preparation was evaluated using the [facility] Bowel Preparation Scale with scores of: right colon = 2, transverse colon = 2, left colon = 2  The total BBPS score was 6  Bowel prep was adequate  The patient experienced no blood loss  The procedure was not difficult  The patient tolerated the procedure well  There were no apparent complications  ANESTHESIA INFORMATION: ASA: III Anesthesia Type: IV Sedation with Anesthesia MEDICATIONS: No administrations occurring from 1255 to 1330 on 08/02/21 FINDINGS: Few small, mild scattered diverticula containing no content with no bleeding in the sigmoid colon The cecum, ascending colon, hepatic flexure, transverse colon, splenic flexure, descending colon, rectosigmoid and rectum appeared normal  EVENTS: Procedure Events Event Event Time ENDO CECUM REACHED 8/2/2021  1:23 PM ENDO SCOPE OUT TIME 8/2/2021  1:29 PM SPECIMENS: * No specimens in log * EQUIPMENT: Colonoscope -[id number]     Impression: 1  Diverticulosis coli, sigmoid, RECOMMENDATION: No further screening colonoscopies necessary based on the patient's age Increase fiber supplementation to include fruits, vegetables, whole grain foods, daily Vaishali Nguyen DO, [place], [place]     CT chest abdomen pelvis wo contrast    Result Date: 8/25/2021  Narrative: CT CHEST, ABDOMEN AND PELVIS WITHOUT IV CONTRAST INDICATION:   chronic abdominal pain, T spine and L spine tenderness post syncopal episode  COMPARISON:  Compared with CT of 6/7/2021 TECHNIQUE: CT examination of the chest, abdomen and pelvis was performed without intravenous contrast   Axial, sagittal, and coronal 2D reformatted images were created from the source data and submitted for interpretation  Radiation dose length product (DLP) for this visit:  743 04 mGy-cm   This examination, like all CT scans performed in the [facility], was performed utilizing techniques to minimize radiation dose exposure, including the use of iterative  reconstruction and automated exposure control   Enteric contrast was administered  FINDINGS: CHEST LUNGS:  Lungs are clear  There is no tracheal or endobronchial lesion  PLEURA:  Unremarkable  HEART/GREAT VESSELS: Dilated ascending thoracic aorta measuring 3 5 cm  Unremarkable for patient's age  MEDIASTINUM AND LINDSEY:  Unremarkable  CHEST WALL AND LOWER NECK:   Unremarkable  ABDOMEN LIVER/BILIARY TREE:  Unremarkable  GALLBLADDER:  Gallbladder is surgically absent  SPLEEN:  Unremarkable  PANCREAS:  Unremarkable  ADRENAL GLANDS:  Unremarkable  KIDNEYS/URETERS:  2 mm right kidney midpole calculus in left kidney midpole calculus    No hydronephrosis  STOMACH AND BOWEL:  Stool impacted rectosigmoid colon  Sigmoid diverticulosis  APPENDIX:  There are expected postoperative changes of appendectomy  ABDOMINOPELVIC CAVITY:  No ascites  No pneumoperitoneum  No lymphadenopathy  VESSELS:  Unremarkable for patient's age  PELVIS REPRODUCTIVE ORGANS:  Normal uterine like structure not appreciated  URINARY BLADDER:  Unremarkable  ABDOMINAL WALL/INGUINAL REGIONS:  Unremarkable  OSSEOUS STRUCTURES: Loss of height, sclerosis along the superior endplate of L3, L5 is new  Anterolisthesis of L5 over lumbarised S1 is unchanged  Wedge fracture deformity of T7 and T8  This appears new from portable x-ray of 6/7/2021 No retropulsion  Posterior columns are intact  Streak artifacts from right hip prosthesis  Impression: Moderate wedge fracture deformity of T7, L3 and L5 and severe wedge deformity of T8 appears new  Stool impacted rectosigmoid colon  Workstation performed: [id number]     CT head without contrast    Result Date: 8/25/2021  Narrative: CT BRAIN - WITHOUT CONTRAST INDICATION:   syncope, unknown head strike  COMPARISON:  None  TECHNIQUE:  CT examination of the brain was performed  In addition to axial images, sagittal and coronal 2D reformatted images were created and submitted for interpretation  Radiation dose length product (DLP) for this visit:  805 mGy-cm     This examination, like all CT scans performed in the [facility], was performed utilizing techniques to minimize radiation dose exposure, including the use of iterative reconstruction and automated exposure control  IMAGE QUALITY:  Diagnostic  FINDINGS: PARENCHYMA: Decreased attenuation is noted in periventricular and subcortical white matter demonstrating an appearance that is statistically most likely to represent moderate microangiopathic change  No CT signs of acute infarction  No intracranial mass, mass effect or midline shift  No acute parenchymal hemorrhage  VENTRICLES AND EXTRA-AXIAL SPACES:  Ventricles and extra-axial CSF spaces are prominent commensurate with the degree of volume loss  No hydrocephalus  No acute extra-axial hemorrhage  VISUALIZED ORBITS AND PARANASAL SINUSES:  Unremarkable  CALVARIUM AND EXTRACRANIAL SOFT TISSUES:  Normal      Impression: No acute intracranial abnormality  Workstation performed: [id number]     CT spine cervical without contrast    Result Date: 8/25/2021  Narrative: CT CERVICAL SPINE - WITHOUT CONTRAST INDICATION:   syncope, unknown head strike  COMPARISON:  None  TECHNIQUE:  CT examination of the cervical spine was performed without intravenous contrast   Contiguous axial images were obtained  Sagittal and coronal reconstructions were performed  Radiation dose length product (DLP) for this visit:  385 mGy-cm   This examination, like all CT scans performed in the [facility], was performed utilizing techniques to minimize radiation dose exposure, including the use of iterative reconstruction and automated exposure control  IMAGE QUALITY:  Diagnostic  FINDINGS: ALIGNMENT:  Normal alignment of the cervical spine  No subluxation  VERTEBRAL BODIES:  No fracture  DEGENERATIVE CHANGES:  Degenerative disc changes at multiple levels from C3 to C7 PREVERTEBRAL AND PARASPINAL SOFT TISSUES:  Unremarkable   THORACIC INLET:  Normal      Impression: No cervical spine fracture or traumatic malalignment  Degenerative disc changes from C3 to C7  Workstation performed: [id number]     CTA chest pe study    Result Date: 8/26/2021  Narrative: CTA - CHEST WITH IV CONTRAST - PULMONARY ANGIOGRAM INDICATION:   PE suspected, high pretest prob DVT in LLE  COMPARISON: Unenhanced CT from August 25, 2021  TECHNIQUE: CTA examination of the chest was performed using angiographic technique according to a protocol specifically tailored to evaluate for pulmonary embolism  Axial, sagittal, and coronal 2D reformatted images were created from the source data and  submitted for interpretation  In addition, coronal 3D MIP postprocessing was performed on the acquisition scanner  Radiation dose length product (DLP) for this visit:  248 35 mGy-cm   This examination, like all CT scans performed in the [facility], was performed utilizing techniques to minimize radiation dose exposure, including the use of iterative  reconstruction and automated exposure control  IV Contrast:  56 mL of iodixanol (VISIPAQUE)  FINDINGS: PULMONARY ARTERIAL TREE:  Acute lobar and segmental thromboemboli in the right lower lobe  LUNGS:  Mild subsegmental atelectasis in both lower lobes, right middle lobe and the lingula  Lungs otherwise clear  PLEURA:  Unremarkable  HEART/GREAT VESSELS:  Unremarkable for patient's age  RV/LV ratio: 0 92  MEDIASTINUM AND LINDSEY: No lymphadenopathy or mass  Small hiatal hernia  Esophagus unremarkable  Trachea and main stem bronchi normal  CHEST WALL AND LOWER NECK:   Unremarkable  VISUALIZED STRUCTURES IN THE UPPER ABDOMEN:  Unremarkable  OSSEOUS STRUCTURES:  Compression fractures of T7 and T8 8 again demonstrated  No new fractures  Impression: Lobar and segmental pulmonary emboli in the right lower lobe  The calculated ratio of right ventricular to left ventricular diameter (RV/LV ratio) is 0 92  This is greater than 0 9, which is abnormal and suggests right heart strain   An abnormal RV/LV ratio has been shown to be associated with an increased risk of 30 day mortality in the setting of acute pulmonary embolism  Urgent consultation with the medical critical care team is recommended  I personally discussed this study with [facility] on 8/26/2021 at 8:56 PM  Workstation performed: [id number]     CT recon only thoracolumbar (No Charge)    Result Date: 8/25/2021  Narrative: CT THORACIC AND LUMBAR SPINE INDICATION:   back pain post syncope  COMPARISON:  Compared with CT abdomen of 6/7/2021 and chest x-ray of 6/7/2021  TECHNIQUE: Axial CT examination of the thoracic and lumbar spine was obtained utilizing reconstructed images from CT of the chest abdomen and pelvis performed the same day  Images were reformatted in the sagittal and coronal planes  This examination, like all CT scans performed in the [place], was performed utilizing techniques to minimize radiation dose exposure, including the use of iterative reconstruction and automated exposure control  FINDINGS: ALIGNMENT: Normal alignment of lumbar vertebral bodies  No subluxation  VERTEBRAL BODIES: Loss of height, sclerosis along the superior endplate of L3, L5 is new  Anterolisthesis of L5 over lumbarised S1 is unchanged  Moderate right fracture deformity of T7 and and severe wedge deformity of T8  This appears new from portable x-ray of 6/7/2021  No retropulsion  Posterior columns are intact  DEGENERATIVE CHANGES: Anterolisthesis of L5 over S1  PREVERTEBRAL AND PARASPINAL SOFT TISSUES: Normal      Impression: New moderate wedge fracture deformities of T7, L3, L5 with severe fracture deformity of T8  No retropulsion  Workstation performed: [id number]     VAS lower limb venous duplex study, unilateral/limited    Result Date: 8/26/2021  Narrative:  [facility]: Indications: Patient presents with Left lower extremity swelling since recent fall   Operative History: Patient denies previous cardiovascular surgeries Risk Factors The patient has history of HTN  She has no history of Hyperlipidemia  FINDINGS:  Left        Impression          CFV         Thrombosed (acute)  PFV         Thrombosed (acute)  FV Prox     Thrombosed (acute)  FV Mid      Thrombosed (acute)  FV Dist     Thrombosed (acute)  Popliteal   Thrombosed (acute)  PostTibial  Thrombosed (acute)     CONCLUSION: Impression: RIGHT LOWER LIMB LIMITED: Evaluation shows no evidence of thrombus in the common femoral vein  Doppler evaluation shows a normal response to augmentation maneuvers  LEFT LOWER LIMB: Acute occlusive thrombus noted in the paired posterior tibial veins extending through to the common femoral vein  No evidence of superficial thrombophlebitis noted  Doppler evaluation shows a normal response to augmentation maneuvers  Popliteal, posterior tibial and anterior tibial arterial Doppler waveforms are biphasic  Technical findings were given to Dr Lisa Sanders via Molecular Sensing    SIGNATURE: Electronically Signed by: Ananya Babcock MD, [address] on 2021-08-26 11:52:04 PM        HISTORY:    Past Medical History:   Diagnosis Date    Chronic pain     Diverticulitis     GERD (gastroesophageal reflux disease)     Hypertension     Shingles     Shortness of breath        Past Surgical History:   Procedure Laterality Date    APPENDECTOMY      CHOLECYSTECTOMY      INCISION AND DRAINAGE RETROPHYARYNGEAL ABCESS      IR BIOPSY BONE MARROW  8/31/2021    JOINT REPLACEMENT      PELVIC FRACTURE SURGERY      TOTAL HIP ARTHROPLASTY         Family History   Problem Relation Age of Onset    Stroke Mother        Social History     Socioeconomic History    Marital status: /Civil Union     Spouse name: None    Number of children: None    Years of education: None    Highest education level: None   Occupational History    None   Tobacco Use    Smoking status: Never Smoker    Smokeless tobacco: Never Used   Vaping Use    Vaping Use: Never used   Substance and Sexual Activity    Alcohol use: Never    Drug use: Never    Sexual activity: Not Currently   Other Topics Concern    None   Social History Narrative    None     Social Determinants of Health     Financial Resource Strain:     Difficulty of Paying Living Expenses:    Food Insecurity:     Worried About Running Out of Food in the Last Year:     Ran Out of Food in the Last Year:    Transportation Needs:     Lack of Transportation (Medical):      Lack of Transportation (Non-Medical):    Physical Activity:     Days of Exercise per Week:     Minutes of Exercise per Session:    Stress:     Feeling of Stress :    Social Connections:     Frequency of Communication with Friends and Family:     Frequency of Social Gatherings with Friends and Family:     Attends [employer] Services:     Active Member of Clubs or Organizations:     Attends Club or Organization Meetings:     Marital Status:    Intimate Partner Violence:     Fear of Current or Ex-Partner:     Emotionally Abused:     Physically Abused:     Sexually Abused:          Current Facility-Administered Medications:     acetaminophen (TYLENOL) tablet 975 mg, 975 mg, Oral, Q8H [address], Chelsie Edouard PA-C, 975 mg at 09/03/21 0603    ALPRAZolam (XANAX) tablet 0 25 mg, 0 25 mg, Oral, BID PRN, Wseley Stevenson,     apixaban (ELIQUIS) tablet 10 mg, 10 mg, Oral, BID, Abhijeet Lewisburg, CRNP, 10 mg at 09/03/21 1738    [START ON 9/6/2021] apixaban (ELIQUIS) tablet 5 mg, 5 mg, Oral, BID, Abhijeet Lewisburg, CRNP    atorvastatin (LIPITOR) tablet 20 mg, 20 mg, Oral, After Dinner, Abhijeet Valles, CRNP, 20 mg at 09/03/21 1737    dicyclomine (BENTYL) capsule 20 mg, 20 mg, Oral, 4x Daily PRN, Nikolay Nayeli Hakim, DO    docusate sodium (COLACE) capsule 100 mg, 100 mg, Oral, BID, Juanita Hakim, DO, 100 mg at 08/30/21 1730    gabapentin (NEURONTIN) capsule 100 mg, 100 mg, Oral, BID, Juanita Hakim, DO, 100 mg at 09/03/21 1738    lidocaine (LIDODERM) 5 % patch 2 patch, 2 patch, Topical, Daily, Lauren Raj Saint, PA-C, 2 patch at 09/03/21 0852    losartan (COZAAR) tablet 50 mg, 50 mg, Oral, Daily, Narciso Yost MD, 50 mg at 09/03/21 0851    ondansetron (ZOFRAN) injection 4 mg, 4 mg, Intravenous, Q6H PRN, Lanny Lauren MD    oxyCODONE (ROXICODONE) IR tablet 2 5 mg, 2 5 mg, Oral, Q4H PRN, Steffanie Templeton MD, 2 5 mg at 09/03/21 1113    pantoprazole (PROTONIX) EC tablet 40 mg, 40 mg, Oral, Early Morning, Juanita Nina DO, 40 mg at 09/03/21 0603    polyethylene glycol (MIRALAX) packet 17 g, 17 g, Oral, Daily, Juanita Nina DO, 17 g at 08/30/21 3736    sucralfate (CARAFATE) tablet 1 g, 1 g, Oral, 4x Daily (AC & HS), Juanita Nina, DO, 1 g at 09/03/21 1738    Medications Prior to Admission   Medication    acetaminophen (TYLENOL) 325 mg tablet    atorvastatin (LIPITOR) 20 mg tablet    dicyclomine (BENTYL) 20 mg tablet    docusate sodium (COLACE) 100 mg capsule    gabapentin (NEURONTIN) 100 mg capsule    lidocaine (LIDODERM) 5 %    losartan (COZAAR) 50 mg tablet    pantoprazole (PROTONIX) 40 mg tablet    polyethylene glycol (MIRALAX) 17 g packet    potassium chloride (Klor-Con) 10 mEq tablet    sucralfate (CARAFATE) 1 g tablet    traMADol (ULTRAM) 50 mg tablet    Zoster Vac Recomb Adjuvanted 50 MCG/0 5ML SUSR       Allergies   Allergen Reactions    Aloe Other (See Comments)     Fresh fruit has reaction to   She can have canned fruit    Banana - Food Allergy Other (See Comments)     Itchy throat    Naproxen Diarrhea    Oxycodone-Acetaminophen Hives       Labs and pertinent reports reviewed  This note has been generated by voice recognition software system  Therefore, there may be spelling, grammar, and or syntax errors  Please contact if questions arise

## 2021-09-03 NOTE — PLAN OF CARE
Problem: Potential for Falls  Goal: Patient will remain free of falls  Description: INTERVENTIONS:  - Educate patient/family on patient safety including physical limitations  - Instruct patient to call for assistance with activity   - Consult OT/PT to assist with strengthening/mobility   - Keep Call bell within reach  - Keep bed low and locked with side rails adjusted as appropriate  - Keep care items and personal belongings within reach  - Initiate and maintain comfort rounds  - Make Fall Risk Sign visible to staff  - Apply yellow socks and bracelet for high fall risk patients  - Consider moving patient to room near nurses station  Outcome: Progressing     Problem: NEUROSENSORY - ADULT  Goal: Achieves stable or improved neurological status  Description: INTERVENTIONS  - Monitor and report changes in neurological status  - Monitor vital signs such as temperature, blood pressure, glucose, and any other labs ordered   - Initiate measures to prevent increased intracranial pressure  - Monitor for seizure activity and implement precautions if appropriate      Outcome: Progressing     Problem: METABOLIC, FLUID AND ELECTROLYTES - ADULT  Goal: Electrolytes maintained within normal limits  Description: INTERVENTIONS:  - Monitor labs and assess patient for signs and symptoms of electrolyte imbalances  - Administer electrolyte replacement as ordered  - Monitor response to electrolyte replacements, including repeat lab results as appropriate  - Instruct patient on fluid and nutrition as appropriate  Outcome: Progressing     Problem: SKIN/TISSUE INTEGRITY - ADULT  Goal: Skin Integrity remains intact(Skin Breakdown Prevention)  Description: Assess:  -Inspect skin when repositioning, toileting, and assisting with ADLS  -Assess extremities for adequate circulation and sensation     Bed Management:  -Have minimal linens on bed & keep smooth, unwrinkled  -Change linens as needed when moist or perspiring    Toileting:  -Offer bedside commode    Activity:  -Encourage activity and walks on unit  -Encourage or provide ROM exercises   -Use appropriate equipment to lift or move patient in bed    Skin Care:  -Avoid use of baby powder, tape, friction and shearing, hot water or constrictive clothing  -Do not massage red bony areas    Outcome: Progressing  Goal: Pressure injury heals and does not worsen  Description: Interventions:  - Implement low air loss mattress or specialty surface (Criteria met)  - Apply silicone foam dressing  - Utilize friction reducing device or surface for transfers   - Consider nutrition services referral as needed  Outcome: Progressing     Problem: MOBILITY - ADULT  Goal: Maintain or return to baseline ADL function  Description: INTERVENTIONS:  -  Assess patient's ability to carry out ADLs; assess patient's baseline for ADL function and identify physical deficits which impact ability to perform ADLs (bathing, care of mouth/teeth, toileting, grooming, dressing, etc )  - Assess/evaluate cause of self-care deficits   - Assess range of motion  - Assess patient's mobility; develop plan if impaired  - Assess patient's need for assistive devices and provide as appropriate  - Encourage maximum independence but intervene and supervise when necessary  - Involve family in performance of ADLs  - Assess for home care needs following discharge   - Consider OT consult to assist with ADL evaluation and planning for discharge  - Provide patient education as appropriate  Outcome: Progressing  Goal: Maintains/Returns to pre admission functional level  Description: INTERVENTIONS:  - Perform BMAT or MOVE assessment daily    - Set and communicate daily mobility goal to care team and patient/family/caregiver     - Collaborate with rehabilitation services on mobility goals if consulted  - Out of bed for toileting  - Record patient progress and toleration of activity level   Outcome: Progressing     Problem: Prexisting or High Potential for Compromised Skin Integrity  Goal: Skin integrity is maintained or improved  Description: INTERVENTIONS:  - Identify patients at risk for skin breakdown  - Assess and monitor skin integrity  - Assess and monitor nutrition and hydration status  - Monitor labs   - Assess for incontinence   - Turn and reposition patient  - Assist with mobility/ambulation  - Relieve pressure over bony prominences  - Avoid friction and shearing  - Provide appropriate hygiene as needed including keeping skin clean and dry  - Evaluate need for skin moisturizer/barrier cream  - Collaborate with interdisciplinary team   - Patient/family teaching  - Consider wound care consult   Outcome: Progressing     Problem: Knowledge Deficit  Goal: Patient/family/caregiver demonstrates understanding of disease process, treatment plan, medications, and discharge instructions  Description: Complete learning assessment and assess knowledge base  Interventions:  - Provide teaching at level of understanding  - Provide teaching via preferred learning methods  Outcome: Progressing     Problem: Nutrition/Hydration-ADULT  Goal: Nutrient/Hydration intake appropriate for improving, restoring or maintaining nutritional needs  Description: Monitor and assess patient's nutrition/hydration status for malnutrition  Collaborate with interdisciplinary team and initiate plan and interventions as ordered  Monitor patient's weight and dietary intake as ordered or per policy  Utilize nutrition screening tool and intervene as necessary  Determine patient's food preferences and provide high-protein, high-caloric foods as appropriate       INTERVENTIONS:  - Monitor oral intake, urinary output, labs, and treatment plans  - Assess nutrition and hydration status and recommend course of action  - Evaluate amount of meals eaten  - Assist patient with eating if necessary   - Allow adequate time for meals  - Recommend/ encourage appropriate diets, oral nutritional supplements, and vitamin/mineral supplements  - Order, calculate, and assess calorie counts as needed  - Recommend, monitor, and adjust tube feedings and TPN/PPN based on assessed needs  - Assess need for intravenous fluids  - Provide specific nutrition/hydration education as appropriate  - Include patient/family/caregiver in decisions related to nutrition  Outcome: Progressing

## 2021-09-03 NOTE — PROGRESS NOTES
[address] [facility] NOTE   Juanita Cedillo 80 y o  female MRN: [id number]  Unit/Bed#: [facility] 810-01 Encounter: [id number]  Reason for Consult:  Hypercalcemia    ASSESSMENT and PLAN:    80-year-old female with back pain recently found to have compression fractures for home nephrology is consulted for hypercalcemia   Ring patient had presented initially with unresponsiveness syncopal episode and went to [facility] and was transferred to [facility] for evaluation due to compression fractures     1) hypercalcemia     -initial CT scan without sign of malignancy  -SPEP-immunofixation with IgG kappa  -UPEP-  -free light chain-6 1 with elevated kappa free chain  -PTH-61  -PTHrP-  -patient received Zometa on August 28th  -initially started on intravenous fluids  -August 30th-calcium improving   Creatinine stable 0 5    -August 31st-creatinine stable 0 6 mg/dL   Calcium stable   Status post bone marrow biopsy  - sept 1st - creat 0 5 mg/dL  [place] stable    -September 2nd-creatinine stable 5  Calcium stable  Electrolytes stable  -September 3rd-creatinine stable 0 6  Potassium low being repleted by primary team   Calcium 7 8      Plan  -BMP in a m   -from the renal standpoint patient is stable   Please call back if questions or issues arise    Please continue to maintain normal kalemia as attempting   -I have reached out to the primary team to discuss  -further plans for myeloma per primary and Hematology team  -patient should be referred to Nephrology for post hospital follow-up upon discharge  -lower losartan if the blood pressures decrease    2) 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     -baseline creatinine 0 9-1 1 mg/dL question a  -urinalysis in June bland  -admission creatinine 2 2 mg/dL  -initially received intravenous fluids  -CT scan without hydronephrosis but bilateral nephrolithiasis  -ARB initially held  -August 31st-creatinine stable  -September 1st-creatinine stable and below baseline (off ARB)   Restarted on ARB per primary team   -September 2nd-stable and below baseline   -September 3rd-creatinine 0 6  Slowly rising but may be a reflection of ARB     3) compression fractures     -per primary team     4) acid/base-stable     5) DVT/PE-new-per primary team     -on Eliquis     6) hypertension     -ARB restarted by primary team on 09/01  Hold parameters placed     7) hypophosphatemia-improved with repletion     8) paraproteinemia-awaiting final Hematology recommendations regarding flow and bone marrow biopsy results which appear to show abnormalities  SUBJECTIVE / 24H INTERVAL HISTORY:    Patient with right abdomen  Otherwise no shortness of breath  Blood pressure is 303-376 systolic  Afebrile  Urine output does not appear to be strictly recorded      OBJECTIVE:  Current Weight: Weight - Scale: 49 9 kg (110 lb 0 2 oz)  Vitals:    09/01/21 2204 09/02/21 0723 09/02/21 2231 09/03/21 0813   BP: 133/78 128/77 130/74 145/80   BP Location:    Left arm   Pulse: 82 70 93 86   Resp: 18 18 18 18   Temp: 97 6 °F (36 4 °C) 97 5 °F (36 4 °C) 97 7 °F (36 5 °C) 98 4 °F (36 9 °C)   TempSrc:    Oral   SpO2: 96% 99% 96% 96%   Weight:       Height:           Intake/Output Summary (Last 24 hours) at 9/3/2021 1502  Last data filed at 9/3/2021 1300  Gross per 24 hour   Intake 120 ml   Output 300 ml   Net -180 ml     General: NAD  Skin: no rash  Eyes: anicteric sclera  ENT: moist mucous membrane  Neck: supple  Chest: CTA b/l, no ronchii, no wheeze, no rubs, no rales  CVS: s1s2, no murmur, no gallop, no rub  Abdomen: soft, nontender, nl sounds  Extremities: no edema LE b/l  : no vilchis  Neuro: AAOX3  Psych: normal affect      Medications:    Current Facility-Administered Medications:     acetaminophen (TYLENOL) tablet 975 mg, 975 mg, Oral, Q8H Chelsie BARNEY PA-C, 975 mg at 09/03/21 0603    ALPRAZolam (XANAX) tablet 0 25 mg, 0 25 mg, Oral, BID PRN, Sivan Narvaez,     apixaban (ELIQUIS) tablet 10 mg, 10 mg, Oral, BID, Moises Ordoñez, CRNP, 10 mg at 09/03/21 4586    [START ON 9/6/2021] apixaban (ELIQUIS) tablet 5 mg, 5 mg, Oral, BID, Moises Ordoñez, CRNP    atorvastatin (LIPITOR) tablet 20 mg, 20 mg, Oral, After Lambert So, CRNP, 20 mg at 09/02/21 1700    dicyclomine (BENTYL) capsule 20 mg, 20 mg, Oral, 4x Daily PRN, Nilesh Colem, DO    docusate sodium (COLACE) capsule 100 mg, 100 mg, Oral, BID, Juanita Hakim, DO, 100 mg at 08/30/21 1730    gabapentin (NEURONTIN) capsule 100 mg, 100 mg, Oral, BID, Juanita Hakim, DO, 100 mg at 09/03/21 0851    lidocaine (LIDODERM) 5 % patch 2 patch, 2 patch, Topical, Daily, Chelsie Edouard PA-C, 2 patch at 09/03/21 0852    losartan (COZAAR) tablet 50 mg, 50 mg, Oral, Daily, Marilou Allison MD, 50 mg at 09/03/21 0851    ondansetron (ZOFRAN) injection 4 mg, 4 mg, Intravenous, Q6H PRN, Rosita Wilhelm MD    oxyCODONE (ROXICODONE) IR tablet 2 5 mg, 2 5 mg, Oral, Q4H PRN, Rosita Wilhelm MD, 2 5 mg at 09/03/21 1113    pantoprazole (PROTONIX) EC tablet 40 mg, 40 mg, Oral, Early Morning, Juanita Hakim, DO, 40 mg at 09/03/21 0603    polyethylene glycol (MIRALAX) packet 17 g, 17 g, Oral, Daily, Juanita Hakim, DO, 17 g at 08/30/21 5162    sucralfate (CARAFATE) tablet 1 g, 1 g, Oral, 4x Daily (AC & HS), Juanita Hakim, DO, 1 g at 09/03/21 1113    Laboratory Results:  Results from last 7 days   Lab Units 09/03/21  0457 09/02/21  1009 09/02/21  0512 09/01/21  0515 09/01/21  0514 08/31/21  0452 08/30/21  0549 08/29/21  1153 08/28/21  1046 08/27/21  1515   WBC Thousand/uL  --   --   --  14 32*  --  10 00  --   --  10 52* 12 09*   HEMOGLOBIN g/dL  --  10 5*  --  9 6*  --  8 9*  --   --  11 2* 11 3*   HEMATOCRIT %  --  32 3*  --  28 9*  --  27 2*  --   --  33 9* 34 2*   PLATELETS Thousands/uL  --   --   --  242  --  185  --   --  198 187   POTASSIUM mmol/L 3 3*  --  3 6  --  3 4* 3 8 3 9 3 5 3 0* 3 3*   CHLORIDE mmol/L 112*  --  112*  --  111* 110* 110* 111* 107 105   CO2 mmol/L 23  --  23 --  24 24 23 22 21 24   BUN mg/dL 26*  --  21  --  19 30* 24 18 24 30*   CREATININE mg/dL 0 63  --  0 54*  --  0 52* 0 59* 0 58* 0 49* 0 56* 0 74   CALCIUM mg/dL 7 8*  --  8 3  --  8 0* 8 5 8 7 8 9 10 3* 10 6*   MAGNESIUM mg/dL  --   --   --   --   --   --   --   --   --  1 9   PHOSPHORUS mg/dL  --   --   --   --   --   --  2 3 1 6* 1 3* 1 8*

## 2021-09-04 VITALS
SYSTOLIC BLOOD PRESSURE: 135 MMHG | WEIGHT: 110.01 LBS | BODY MASS INDEX: 23.09 KG/M2 | OXYGEN SATURATION: 97 % | TEMPERATURE: 98.5 F | DIASTOLIC BLOOD PRESSURE: 79 MMHG | HEART RATE: 94 BPM | HEIGHT: 58 IN | RESPIRATION RATE: 17 BRPM

## 2021-09-04 PROBLEM — C90.00 MULTIPLE MYELOMA NOT HAVING ACHIEVED REMISSION (HCC): Status: ACTIVE | Noted: 2021-09-04

## 2021-09-04 LAB
ANION GAP SERPL CALCULATED.3IONS-SCNC: 5 MMOL/L (ref 4–13)
BASOPHILS # BLD AUTO: 0.06 THOUSANDS/ΜL (ref 0–0.1)
BASOPHILS NFR BLD AUTO: 1 % (ref 0–1)
BUN SERPL-MCNC: 24 MG/DL (ref 5–25)
CA-I BLD-SCNC: 1.17 MMOL/L (ref 1.12–1.32)
CALCIUM SERPL-MCNC: 8.4 MG/DL (ref 8.3–10.1)
CHLORIDE SERPL-SCNC: 112 MMOL/L (ref 100–108)
CO2 SERPL-SCNC: 23 MMOL/L (ref 21–32)
CREAT SERPL-MCNC: 0.6 MG/DL (ref 0.6–1.3)
EOSINOPHIL # BLD AUTO: 0.25 THOUSAND/ΜL (ref 0–0.61)
EOSINOPHIL NFR BLD AUTO: 2 % (ref 0–6)
ERYTHROCYTE [DISTWIDTH] IN BLOOD BY AUTOMATED COUNT: 15.8 % (ref 11.6–15.1)
GFR SERPL CREATININE-BSD FRML MDRD: 81 ML/MIN/1.73SQ M
GLUCOSE SERPL-MCNC: 91 MG/DL (ref 65–140)
HCT VFR BLD AUTO: 29 % (ref 34.8–46.1)
HGB BLD-MCNC: 9.4 G/DL (ref 11.5–15.4)
IMM GRANULOCYTES # BLD AUTO: 0.16 THOUSAND/UL (ref 0–0.2)
IMM GRANULOCYTES NFR BLD AUTO: 2 % (ref 0–2)
LYMPHOCYTES # BLD AUTO: 1.81 THOUSANDS/ΜL (ref 0.6–4.47)
LYMPHOCYTES NFR BLD AUTO: 17 % (ref 14–44)
MCH RBC QN AUTO: 32.4 PG (ref 26.8–34.3)
MCHC RBC AUTO-ENTMCNC: 32.4 G/DL (ref 31.4–37.4)
MCV RBC AUTO: 100 FL (ref 82–98)
MONOCYTES # BLD AUTO: 1.2 THOUSAND/ΜL (ref 0.17–1.22)
MONOCYTES NFR BLD AUTO: 11 % (ref 4–12)
NEUTROPHILS # BLD AUTO: 7.4 THOUSANDS/ΜL (ref 1.85–7.62)
NEUTS SEG NFR BLD AUTO: 67 % (ref 43–75)
NRBC BLD AUTO-RTO: 0 /100 WBCS
PLATELET # BLD AUTO: 339 THOUSANDS/UL (ref 149–390)
PMV BLD AUTO: 10 FL (ref 8.9–12.7)
POTASSIUM SERPL-SCNC: 3.5 MMOL/L (ref 3.5–5.3)
RBC # BLD AUTO: 2.9 MILLION/UL (ref 3.81–5.12)
SODIUM SERPL-SCNC: 140 MMOL/L (ref 136–145)
WBC # BLD AUTO: 10.88 THOUSAND/UL (ref 4.31–10.16)

## 2021-09-04 PROCEDURE — NC001 PR NO CHARGE: Performed by: PHYSICIAN ASSISTANT

## 2021-09-04 PROCEDURE — 85025 COMPLETE CBC W/AUTO DIFF WBC: CPT | Performed by: PHYSICIAN ASSISTANT

## 2021-09-04 PROCEDURE — 99232 SBSQ HOSP IP/OBS MODERATE 35: CPT | Performed by: SURGERY

## 2021-09-04 PROCEDURE — 80048 BASIC METABOLIC PNL TOTAL CA: CPT | Performed by: PHYSICIAN ASSISTANT

## 2021-09-04 PROCEDURE — 82330 ASSAY OF CALCIUM: CPT | Performed by: PHYSICIAN ASSISTANT

## 2021-09-04 RX ORDER — OXYCODONE HYDROCHLORIDE 5 MG/1
2.5 TABLET ORAL EVERY 4 HOURS PRN
Qty: 10 TABLET | Refills: 0 | Status: SHIPPED | OUTPATIENT
Start: 2021-09-04

## 2021-09-04 RX ORDER — DOCUSATE SODIUM 100 MG/1
100 CAPSULE, LIQUID FILLED ORAL 2 TIMES DAILY PRN
Refills: 0
Start: 2021-09-04

## 2021-09-04 RX ORDER — ACETAMINOPHEN 325 MG/1
975 TABLET ORAL EVERY 8 HOURS SCHEDULED
Refills: 0
Start: 2021-09-04

## 2021-09-04 RX ORDER — ALPRAZOLAM 0.25 MG/1
0.25 TABLET ORAL 2 TIMES DAILY PRN
Qty: 6 TABLET | Refills: 0 | Status: SHIPPED | OUTPATIENT
Start: 2021-09-04

## 2021-09-04 RX ORDER — POTASSIUM CHLORIDE 20 MEQ/1
40 TABLET, EXTENDED RELEASE ORAL ONCE
Status: COMPLETED | OUTPATIENT
Start: 2021-09-04 | End: 2021-09-04

## 2021-09-04 RX ORDER — LIDOCAINE 50 MG/G
2 PATCH TOPICAL DAILY
Refills: 0
Start: 2021-09-05

## 2021-09-04 RX ADMIN — ACETAMINOPHEN 975 MG: 325 TABLET, FILM COATED ORAL at 05:35

## 2021-09-04 RX ADMIN — POTASSIUM CHLORIDE 40 MEQ: 1500 TABLET, EXTENDED RELEASE ORAL at 08:55

## 2021-09-04 RX ADMIN — SUCRALFATE 1 G: 1 TABLET ORAL at 15:10

## 2021-09-04 RX ADMIN — ACETAMINOPHEN 975 MG: 325 TABLET, FILM COATED ORAL at 15:10

## 2021-09-04 RX ADMIN — DOCUSATE SODIUM 100 MG: 100 CAPSULE, LIQUID FILLED ORAL at 08:56

## 2021-09-04 RX ADMIN — APIXABAN 10 MG: 5 TABLET, FILM COATED ORAL at 17:22

## 2021-09-04 RX ADMIN — APIXABAN 10 MG: 5 TABLET, FILM COATED ORAL at 08:55

## 2021-09-04 RX ADMIN — SUCRALFATE 1 G: 1 TABLET ORAL at 05:35

## 2021-09-04 RX ADMIN — GABAPENTIN 100 MG: 100 CAPSULE ORAL at 08:56

## 2021-09-04 RX ADMIN — POLYETHYLENE GLYCOL 3350 17 G: 17 POWDER, FOR SOLUTION ORAL at 08:55

## 2021-09-04 RX ADMIN — SUCRALFATE 1 G: 1 TABLET ORAL at 12:29

## 2021-09-04 RX ADMIN — LOSARTAN POTASSIUM 50 MG: 50 TABLET, FILM COATED ORAL at 08:56

## 2021-09-04 RX ADMIN — LIDOCAINE 2 PATCH: 50 PATCH TOPICAL at 08:56

## 2021-09-04 RX ADMIN — ATORVASTATIN CALCIUM 20 MG: 20 TABLET, FILM COATED ORAL at 17:22

## 2021-09-04 RX ADMIN — GABAPENTIN 100 MG: 100 CAPSULE ORAL at 17:22

## 2021-09-04 RX ADMIN — PANTOPRAZOLE SODIUM 40 MG: 40 TABLET, DELAYED RELEASE ORAL at 05:35

## 2021-09-04 RX ADMIN — DOCUSATE SODIUM 100 MG: 100 CAPSULE, LIQUID FILLED ORAL at 17:22

## 2021-09-04 RX ADMIN — OXYCODONE HYDROCHLORIDE 2.5 MG: 5 TABLET ORAL at 16:49

## 2021-09-04 NOTE — PLAN OF CARE
Problem: Potential for Falls  Goal: Patient will remain free of falls  Description: INTERVENTIONS:  - Educate patient/family on patient safety including physical limitations  - Instruct patient to call for assistance with activity   - Consult OT/PT to assist with strengthening/mobility   - Keep Call bell within reach  - Keep bed low and locked with side rails adjusted as appropriate  - Keep care items and personal belongings within reach  - Initiate and maintain comfort rounds  - Make Fall Risk Sign visible to staff  - Offer Toileting every 2 Hours, in advance of need  - Initiate/Maintain bed alarm  - Obtain necessary fall risk management equipment: bed/chair alarm  - Apply yellow socks and bracelet for high fall risk patients  - Consider moving patient to room near nurses station  Outcome: Progressing     Problem: NEUROSENSORY - ADULT  Goal: Achieves stable or improved neurological status  Description: INTERVENTIONS  - Monitor and report changes in neurological status  - Monitor vital signs such as temperature, blood pressure, glucose, and any other labs ordered   - Initiate measures to prevent increased intracranial pressure  - Monitor for seizure activity and implement precautions if appropriate      Outcome: Progressing     Problem: METABOLIC, FLUID AND ELECTROLYTES - ADULT  Goal: Electrolytes maintained within normal limits  Description: INTERVENTIONS:  - Monitor labs and assess patient for signs and symptoms of electrolyte imbalances  - Administer electrolyte replacement as ordered  - Monitor response to electrolyte replacements, including repeat lab results as appropriate  - Instruct patient on fluid and nutrition as appropriate  Outcome: Progressing     Problem: SKIN/TISSUE INTEGRITY - ADULT  Goal: Skin Integrity remains intact(Skin Breakdown Prevention)  Description: Assess:  -Perform Willie assessment every hour  -Clean and moisturize skin every hour  -Inspect skin when repositioning, toileting, and assisting with ADLS    -Assess extremities for adequate circulation and sensation     Bed Management:  -Have minimal linens on bed & keep smooth, unwrinkled  -Change linens as needed when moist or perspiring  -Avoid sitting or lying in one position for more than 2 hours while in bed  -Keep HOB at 45 degrees     Toileting:  -Offer bedside commode  -Assess for incontinence every hour      Activity:  -Mobilize patient 6 times a day  -Encourage activity and walks on unit  -Encourage or provide ROM exercises   -Turn and reposition patient every 2 Hours  -Use appropriate equipment to lift or move patient in bed  -Instruct/ Assist with weight shifting every hour when out of bed in chair  -Consider limitation of chair time 1 hour intervals    Skin Care:  -Avoid use of baby powder, tape, friction and shearing, hot water or constrictive clothing  -Relieve pressure over bony prominences using alleyn  -Do not massage red bony areas    Next Steps:    Outcome: Progressing  Goal: Pressure injury heals and does not worsen  Description: Interventions:  - Implement low air loss mattress or specialty surface (Criteria met)  - Apply silicone foam dressing  - Instruct/assist with weight shifting every 90 minutes when in chair   - Limit chair time to 1 hour intervals  - Use special pressure reducing interventions such as EHOB cushion when in chair   - Apply fecal or urinary incontinence containment device   - Perform passive or active ROM every hour  - Turn and reposition patient & offload bony prominences every hour hours   - Utilize friction reducing device or surface for transfers     - Consider nutrition services referral as needed  Outcome: Progressing     Problem: MOBILITY - ADULT  Goal: Maintain or return to baseline ADL function  Description: INTERVENTIONS:  -  Assess patient's ability to carry out ADLs; assess patient's baseline for ADL function and identify physical deficits which impact ability to perform ADLs (bathing, care of mouth/teeth, toileting, grooming, dressing, etc )  - Assess/evaluate cause of self-care deficits   - Assess range of motion  - Assess patient's mobility; develop plan if impaired  - Assess patient's need for assistive devices and provide as appropriate  - Encourage maximum independence but intervene and supervise when necessary  - Involve family in performance of ADLs  - Assess for home care needs following discharge   - Consider OT consult to assist with ADL evaluation and planning for discharge  - Provide patient education as appropriate  Outcome: Progressing  Goal: Maintains/Returns to pre admission functional level  Description: INTERVENTIONS:  - Perform BMAT or MOVE assessment daily    - Set and communicate daily mobility goal to care team and patient/family/caregiver  - Collaborate with rehabilitation services on mobility goals if consulted  - Perform Range of Motion 6 times a day  - Reposition patient every 2 hours    - Dangle patient 6 times a day  - Stand patient 6 times a day  - Ambulate patient 6 times a day  - Out of bed to chair 6 times a day   - Out of bed for meals 6 times a day  - Out of bed for toileting  - Record patient progress and toleration of activity level   Outcome: Progressing     Problem: Prexisting or High Potential for Compromised Skin Integrity  Goal: Skin integrity is maintained or improved  Description: INTERVENTIONS:  - Identify patients at risk for skin breakdown  - Assess and monitor skin integrity  - Assess and monitor nutrition and hydration status  - Monitor labs   - Assess for incontinence   - Turn and reposition patient  - Assist with mobility/ambulation  - Relieve pressure over bony prominences  - Avoid friction and shearing  - Provide appropriate hygiene as needed including keeping skin clean and dry  - Evaluate need for skin moisturizer/barrier cream  - Collaborate with interdisciplinary team   - Patient/family teaching  - Consider wound care consult   Outcome: Progressing     Problem: Knowledge Deficit  Goal: Patient/family/caregiver demonstrates understanding of disease process, treatment plan, medications, and discharge instructions  Description: Complete learning assessment and assess knowledge base  Interventions:  - Provide teaching at level of understanding  - Provide teaching via preferred learning methods  Outcome: Progressing     Problem: Nutrition/Hydration-ADULT  Goal: Nutrient/Hydration intake appropriate for improving, restoring or maintaining nutritional needs  Description: Monitor and assess patient's nutrition/hydration status for malnutrition  Collaborate with interdisciplinary team and initiate plan and interventions as ordered  Monitor patient's weight and dietary intake as ordered or per policy  Utilize nutrition screening tool and intervene as necessary  Determine patient's food preferences and provide high-protein, high-caloric foods as appropriate       INTERVENTIONS:  - Monitor oral intake, urinary output, labs, and treatment plans  - Assess nutrition and hydration status and recommend course of action  - Evaluate amount of meals eaten  - Assist patient with eating if necessary   - Allow adequate time for meals  - Recommend/ encourage appropriate diets, oral nutritional supplements, and vitamin/mineral supplements  - Order, calculate, and assess calorie counts as needed  - Recommend, monitor, and adjust tube feedings and TPN/PPN based on assessed needs  - Assess need for intravenous fluids  - Provide specific nutrition/hydration education as appropriate  - Include patient/family/caregiver in decisions related to nutrition  Outcome: Progressing

## 2021-09-04 NOTE — TRANSPORTATION MEDICAL NECESSITY
Section I - General Information    Name of Patient: Leigh Ann Alvarado                 : 10/15/1931    Medicare #: [id number]  Transport Date: 21 (PCS is valid for round trips on this date and for all repetitive trips in the 60-day range as noted below )  Origin: [facility]                                                         [facility]  Is the pt's stay covered under Medicare Part A (PPS/DRG)   []     Closest appropriate facility? If no, why is transport to more distant facility required? Yes  If hospice pt, is this transport related to pt's terminal illness? NA       Section II - Medical Necessity Questionnaire  Ambulance transportation is medically necessary only if other means of transport are contraindicated or would be potentially harmful to the patient  To meet this requirement, the patient must either be "bed confined" or suffer from a condition such that transport by means other than ambulance is contraindicated by the patient's condition  The following questions must be answered by the medical professional signing below for this form to be valid:    1)  Describe the MEDICAL CONDITION (physical and/or mental) of this patient AT [address] [place] [address] that requires the patient to be transported in an ambulance and why transport by other means is contraindicated by the patient's condition: Pulmonary embolism, confused, closed wedge compression fractures of vertebra, lower leg DVT, syncope, high fall risk  2) Is the patient "bed confined" as defined below? No  To be "be confined" the patient must satisfy all three of the following conditions: (1) unable to get up from bed without Assistance; AND (2) unable to ambulate; AND (3) unable to sit in a chair or wheelchair  3) Can this patient safely be transported by car or wheelchair van (i e , seated during transport without a medical attendant or monitoring)?    No    4) In addition to completing questions 1-3 above, please check any of the following conditions that apply*:   *Note: supporting documentation for any boxes checked must be maintained in the patient's medical records  If hosp-hosp transfer, describe services needed at 2nd facility not available at 1st facility? Patient is confused  Moderate/severe pain on movement   Medical attendant required   Unable to tolerate seated position for time needed to transport       Section III - Signature of Physician or Healthcare Professional  I certify that the above information is true and correct based on my evaluation of this patient, and represent that the patient requires transport by ambulance and that other forms of transport are contraindicated  I understand that this information will be used by the Centers for Medicare and Medicaid Services (CMS) to support the determination of medical necessity for ambulance services, and I represent that I have personal knowledge of the patient's condition at time of transport  []  If this box is checked, I also certify that the patient is physically or mentally incapable of signing the ambulance service's claim and that the institution with which I am affiliated has furnished care, services, or assistance to the patient  My signature below is made on behalf of the patient pursuant to 42 CFR §424 36(b)(4)  In accordance with 42 CFR §424 37, the specific reason(s) that the patient is physically or mentally incapable of signing the claim form is as follows:  Marshall Lima of Physician* or Healthcare Professional______________________________________________________________  Signature Date 09/04/21 (For scheduled repetitive transports, this form is not valid for transports performed more than 60 days after this date)    Printed Name & Credentials of Physician or Healthcare Professional (MD, DO, RN, etc )____DANIELLE Dejesus____________________________  *Form must be signed by patient's attending physician for scheduled, repetitive transports   For non-repetitive, unscheduled ambulance transports, if unable to obtain the signature of the attending physician, any of the following may sign (choose appropriate option below)  [] Physician Assistant []  Clinical Nurse Specialist []  Registered Nurse  []  Nurse Practitioner  [] Discharge Planner

## 2021-09-04 NOTE — ASSESSMENT & PLAN NOTE
· Family reports patient being largely immobile for the past 3 weeks  · Patient has decreased pulses in left lower leg  Patient is noted to have swelling in LLE  · Duplex completed and DVT was reported, now with evidence of PE   · Heparin drip discontinued, patient was started on Eliquis  · Appreciate Vascular surgery recommendations  · Will need outpatient follow-up with PCP and Hematology/Oncology

## 2021-09-04 NOTE — DISCHARGE SUMMARY
[address] [facility]  Discharge- Maria Eugenia Encinas 10/15/1931, 80 y o  female MRN: [id number]  Unit/Bed#: [facility] 810-01 Encounter: [id number]  Primary Care Provider: Chika Sweeney MD   Date and time admitted to hospital: 8/26/2021  2:10 AM        Discharge Summary - Trauma Service   Maria Eugenia Encinas 80 y o  female MRN: [id number]  Unit/Bed#: [facility] 810-01 Encounter: [id number]    Admission Date: 8/26/2021     Discharge Date: 9/4/2021    Admitting Diagnosis: Acute deep vein thrombosis (DVT) of distal vein of left lower extremity ([facility] ) [I82 4Z2]  Unspecified multiple injuries, initial encounter [T07  XXXA]  Compression fracture of T7 vertebra, initial encounter ([facility] ) [S22 060A]  Compression fracture of T8 vertebra, initial encounter ([facility] ) [S22 060A]  Compression fracture of L3 vertebra, initial encounter ([facility] ) [S32 030A]  Compression fracture of L5 vertebra, initial encounter ([facility] ) [S32 050A]    Discharge Diagnosis:     1  Syncope  2  Pulmonary embolism  3  Acute left lower extremity DVT  4  Multiple myeloma  5  Thoracic spine compression fractures of T7 and T8   6  Lumbar spine compression fractures of L3 and L5   7  Hypertension  8  Hypokalemia  9  Hypercalcemia  Attending and Service: Dr Yolie Aly  Consulting Physician(s):     1  Geriatric Medicine  2  Neurosurgery  4  Nephrology  5  Vascular surgery  6  Medical Oncology  7  Palliative care  8  Cardiology  Imaging and Procedures Performed:     XR spine thoracic 3 vw    Addendum Date: 8/31/2021    ADDENDUM:  I personally discussed this study with [facility] on 8/31/2021 at 12:31 PM      Addendum Date: 8/31/2021    ADDENDUM: Please note that based on the description of labeling of the lumbar spine on concurrent lumbar spine radiograph from the same day the midthoracic vertebral bodies will be located at the T6-T7 level which are grossly stable in appearance    The labeling therefore represents a discrepancy from the prior CT 8/25/2021  Result Date: 8/31/2021  Impression: Diffuse osteopenia limiting evaluation  Stable appearance of previously described mid thoracic vertebral body fractures  Please refer to prior report for description of labeling of the vertebral bodies  Workstation performed: [id number]     XR spine lumbar 2 or 3 views injury    Result Date: 8/31/2021  Impression: Please note that labeling of the lumbar spine will remain in accordance with the examination performed on 6/7/2021 however discrepant from labeling performed on 8/25/2021 CT  Osteopenia limits evaluation with grossly stable loss of height of lumbar vertebral bodies as described above  Workstation performed: [id number]     CTA chest pe study    Result Date: 8/26/2021  Impression: Lobar and segmental pulmonary emboli in the right lower lobe  The calculated ratio of right ventricular to left ventricular diameter (RV/LV ratio) is 0 92  This is greater than 0 9, which is abnormal and suggests right heart strain  An abnormal RV/LV ratio has been shown to be associated with an increased risk of 30 day mortality in the setting of acute pulmonary embolism  Urgent consultation with the medical critical care team is recommended  I personally discussed this study with Alton Canela on 8/26/2021 at 8:56 PM  Workstation performed: [id number]     IR biopsy bone marrow    Result Date: 8/31/2021  Impression: Impression: Technically successful image guided bone marrow aspiration and core biopsy  Workstation performed: [address] [place] [address] Course: Leigh Ann Alvarado is a 26-year-old female who initially presented to [facility] after having a syncopal episode  She denied any knowledge of a traumatic event  She was found to have T7, T8, L3, and L5 compression fractures and was transferred to [facility] for trauma evaluation    Arrival to [facility], she again denied any kind of trauma and denied having any pain or tenderness in her back as well as any other symptoms  She notes she felt back to her baseline at the time of arrival to [facility]  On her initial evaluation by the trauma service at [facility], her primary survey was unremarkable  On secondary survey, she was only notable to have 2+ pitting edema in the left lower extremity with no other abnormalities found  Her workup included labs in the above-noted imaging studies  She was admitted to the trauma service with multiple thoracic and lumbar spine compression fractures, Neurosurgery was consulted and bracing was being considered  A workup for her syncope was initiated including echocardiogram, telemetry monitoring, and duplex study of the left lower extremity due to the unilateral swelling  The patient was also placed on an analgesic regimen  PT and OT were consulted for evaluation and treatment as indicated  During her hospitalization, she had consultations placed to geriatric Medicine for evaluation and review of her medication regimen as well as assistance with her management, Neurosurgery for evaluation of her spinal fractures, Nephrology for evaluation and assistance with management of hypercalcemia and acute kidney injury in setting of stage 3 chronic kidney disease, vascular surgery for a left lower extremity DVT, Hematology/Oncology for clinical concern of multiple myeloma, Palliative care to assist with symptom management and goals of care discussion, and Cardiology to assist with syncope workup, hypertension, and new diagnosis of DVT and Pulmonary Embolism  As noted, she was found to have an acute left lower extremity DVT and subsequent Pulmonary Embolism  There was concern that the use venous thromboembolisms were provoked in setting of suspected multiple myeloma  Please see her complete medical records for further details regarding her hospital course and workup    Ultimately, PT and OT recommended post acute care facility placement for rehab, the patient was agreeable, and case Management assisted with disposition planning  She was deemed stable for discharge on 09/04/2021  On discharge, the patient is instructed to follow-up with the patient's primary care provider to review the events of the patient's recent hospitalization  The patient is instructed to follow-up in the Trauma Clinic as needed  The patient is instructed to follow up with Neurosurgery in 2 weeks with repeat x-rays of her spine prior to follow-up  The patient is instructed to follow up with Hematology/Medical Oncology in 1 week for further evaluation and management  The patient should follow the provided discharge instructions  Condition at Discharge: stable     Discharge instructions/Information to patient and family:   See after visit summary for information provided to patient and family  Provisions for Follow-Up Care:  See after visit summary for information related to follow-up care and any pertinent home health orders  Disposition: See After Visit Summary for discharge disposition information  Planned Readmission: No    Discharge Statement   I spent 30 minutes discharging the patient  This time was spent on the day of discharge  I had direct contact with the patient on the day of discharge  Additional documentation is required if more than 30 minutes were spent on discharge  Discharge Medications:  See after visit summary for reconciled discharge medications provided to patient and family        Kalli Carvajal PA-C  9/4/2021

## 2021-09-04 NOTE — ASSESSMENT & PLAN NOTE
· Chronic history of essential hypertension  · Cozaar was originally held due to STEPHANIE, but has since been resumed  · Appreciate Nephrology evaluation and recommendations  STEPHANIE resolved  · Outpatient follow-up with PCP

## 2021-09-04 NOTE — ASSESSMENT & PLAN NOTE
- LLE extensive DVT with resulting RLL lobar and segmental PE   - Troponin negative, BNP mildly elevated but less than it had been previously  - Heparin drip will be stopped  Patient transitioned to Eliquis  - Patient continues to deny any shortness of breath, chest pain, lightheadedness/dizziness  - Will need outpatient follow-up with PCP and Hematology/Oncology

## 2021-09-04 NOTE — ASSESSMENT & PLAN NOTE
· Closed wedge compression fractures of T7 and T8 with no reported traumatic event  · Appreciate neurosurgery evaluation and recommendations  Non operative management recommended  · TLSO brace when upright and out of bed  · Upright spine x-rays completed and reviewed  · No further workup or intervention necessary at this time  · Continue multimodal analgesic regimen as needed  · Continue to monitor neurologic exam   Patient has remained neurologically intact  · Continue PT and OT evaluation and treatment as indicated  · Outpatient follow-up with Neurosurgery

## 2021-09-04 NOTE — DISCHARGE INSTRUCTIONS
Neurosurgery discharge instructions following spine fracture:      TLSO brace to be worn when out of bed of head of bed greater than 45 degrees  May place brace on while sitting on edge of bed  May be removed for showering   No bending, twisting or heavy lifting  No strenuous activities  No Driving   Follow-up as scheduled in two weeks with repeat spine x-rays to be completed 2-3 days prior to visit  Prescription has been entered electronically  **Please notify MD immediately if you have increased back or leg pain  New numbness, tingling, weakness in your legs and/or bowel/bladder incontinence**    Additional instructions:  - May continue PT and OT evaluation and treatment as indicated with activity per rehab recommendations   - Recommend wrapping left lower extremity from the toes to the knee once daily with Ace bandages for compression to assist with swelling  Bone Marrow Biopsy     WHAT YOU NEED TO KNOW:   A bone marrow biopsy is a procedure to remove a small amount of bone marrow from your bone  Bone marrow is the soft tissue inside your bone that helps to make blood cells  The sample is tested for disease or infection  DISCHARGE INSTRUCTIONS:     1  Limit your activities day of biopsy as directed by your doctor  2  Use medication as ordered  3  Return to your normal diet  Small sips of flat soda will help with nausea  4  Remove band-aid or dressing 24 hours after procedure  Contact Interventional Radiology at [phone] Christine PATIENTS: Contact Interventional Radiology at [phone]) Monalisa Patterson PATIENTS: Contact Interventional Radiology at [phone]) if:    1  Difficulty breathing, nausea or vomiting  2  Chills or fever above 101 F     3  Pain at biopsy site not relieved by medication  4  Develop any redness, swelling, heat, unusual drainage, heavy bruising or bleeding from biopsy site

## 2021-09-04 NOTE — ASSESSMENT & PLAN NOTE
- Per Nephrology, goal is to maintain normokalemia   - Continue to replete potassium as indicated  - Outpatient follow-up with PCP

## 2021-09-04 NOTE — ASSESSMENT & PLAN NOTE
- Patient with new diagnosis of multiple myeloma  - Appreciate Hematology/Oncology evaluation and recommendations   - No further workup necessary during inpatient hospitalization  Outpatient full body CT scan recommended  - Patient follow-up with Hematology/Oncology as an outpatient for further evaluation and discussion treatment options

## 2021-09-04 NOTE — CASE MANAGEMENT
Patient reviewed during care coordination rounds with Dr Lacy Truong who informed that pt is medically stable to go to [address] today  CM spoke with Mandi Licea from [facility] [address] who confirmed that they are able to accept pt today  CM spoke with Avni Hernandez from [place] to schedule BLS transportation with a 5:00 PM pickup  Pt requires BLS transportation due to confusion, high fall risk, and pain on movement  Pt's , RN, Dr Lacy Truong, and Chris aware of transportation time  Report can be called to P: [phone] (Ask for nursing supervisor); F: [phone]

## 2021-09-04 NOTE — ASSESSMENT & PLAN NOTE
· Corrected calcium was 13 1 on presentation  · Nephrology evaluation and recommendations appreciated  Calcium level stable  · Concern for multiple myeloma with formal diagnosis now confirmed following workup  · Appreciate Hematology/Oncology evaluation recommendations this hospitalization  · Will need outpatient follow-up with PCP, Nephrology and Hematology/Oncology

## 2021-09-04 NOTE — ASSESSMENT & PLAN NOTE
· Closed fractures of L3 and L5 with no reported traumatic event  · Appreciate neurosurgery evaluation and recommendations  Non operative management recommended  · TLSO brace when upright and out of bed  · Upright spine x-rays completed and reviewed  · No further workup or intervention necessary at this time  · Continue multimodal analgesic regimen as needed  · Continue to monitor neurologic exam   Patient has remained neurologically intact  · Continue PT and OT evaluation and treatment as indicated  · Outpatient follow-up with Neurosurgery

## 2021-09-04 NOTE — PROGRESS NOTES
[facility]  Progress Note - Maria Dhaliwal 10/15/1931, 80 y o  female MRN: [id number]  Unit/Bed#: [facility] 810-01 Encounter: [id number]  Primary Care Provider: Roselia Méndez MD   Date and time admitted to hospital: 8/26/2021  2:10 AM    Syncope  Assessment & Plan  · Family reports patient syncopized thing while in a chair  · Echo demonstrated EF of 60%  · EKG/troponin--negative  · Cardiology consulted--believed to be related to dehydration  No further workup indicated at this time  · Patient has been asymptomatic since admission  * Pulmonary embolism (HCC)  Assessment & Plan  - LLE extensive DVT with resulting RLL lobar and segmental PE   - Troponin negative, BNP mildly elevated but less than it had been previously  - Heparin drip will be stopped  Patient transitioned to Eliquis  - Patient continues to deny any shortness of breath, chest pain, lightheadedness/dizziness  - Will need outpatient follow-up with PCP and Hematology/Oncology  Lower leg DVT (deep venous thromboembolism), acute, left ([address] )  Assessment & Plan  · Family reports patient being largely immobile for the past 3 weeks  · Patient has decreased pulses in left lower leg  Patient is noted to have swelling in LLE  · Duplex completed and DVT was reported, now with evidence of PE   · Heparin drip discontinued, patient was started on Eliquis  · Appreciate Vascular surgery recommendations  · Will need outpatient follow-up with PCP and Hematology/Oncology  Multiple myeloma not having achieved remission [facility])  Assessment & Plan  - Patient with new diagnosis of multiple myeloma  - Appreciate Hematology/Oncology evaluation and recommendations   - No further workup necessary during inpatient hospitalization  Outpatient full body CT scan recommended  - Patient follow-up with Hematology/Oncology as an outpatient for further evaluation and discussion treatment options      Closed wedge compression fractures of T7, T8 vertebra ([facility])  Assessment & Plan  · Closed wedge compression fractures of T7 and T8 with no reported traumatic event  · Appreciate neurosurgery evaluation and recommendations  Non operative management recommended  · TLSO brace when upright and out of bed  · Upright spine x-rays completed and reviewed  · No further workup or intervention necessary at this time  · Continue multimodal analgesic regimen as needed  · Continue to monitor neurologic exam   Patient has remained neurologically intact  · Continue PT and OT evaluation and treatment as indicated  · Outpatient follow-up with Neurosurgery  Closed compression fractures of L3, L5 lumbar vertebra, initial encounter [facility])  Assessment & Plan  · Closed fractures of L3 and L5 with no reported traumatic event  · Appreciate neurosurgery evaluation and recommendations  Non operative management recommended  · TLSO brace when upright and out of bed  · Upright spine x-rays completed and reviewed  · No further workup or intervention necessary at this time  · Continue multimodal analgesic regimen as needed  · Continue to monitor neurologic exam   Patient has remained neurologically intact  · Continue PT and OT evaluation and treatment as indicated  · Outpatient follow-up with Neurosurgery  Essential hypertension with goal blood pressure less than 140/90  Assessment & Plan  · Chronic history of essential hypertension  · Cozaar was originally held due to STEPHANIE, but has since been resumed  · Appreciate Nephrology evaluation and recommendations  STEPHANIE resolved  · Outpatient follow-up with PCP  Hyperlipidemia  Assessment & Plan  · Hold home Lipitor initially held due to kidney functioning  · Restarted home atorvastatin  · Outpatient follow-up with PCP  Hypercalcemia  Assessment & Plan  · Corrected calcium was 13 1 on presentation  · Nephrology evaluation and recommendations appreciated  Calcium level stable    · Concern for multiple myeloma with formal diagnosis now confirmed following workup  · Appreciate Hematology/Oncology evaluation recommendations this hospitalization  · Will need outpatient follow-up with PCP, Nephrology and Hematology/Oncology  Hypokalemia  Assessment & Plan  - Per Nephrology, goal is to maintain normokalemia   - Continue to replete potassium as indicated  - Outpatient follow-up with PCP  Disposition:  Patient deemed medically appropriate for discharge on 09/03/2021 with appropriate placement available, but transport unable to be arranged  Continue PT and OT evaluation and treatment as indicated  Will discuss with case management today to continue attempting to arrange for transport  SUBJECTIVE:  Chief Complaint:  I feel fine      Subjective:  Patient is overall doing well  She notes some pain in her back with movement, but is comfortable with no pain at rest at this time  She is tolerating her diet without any nausea or vomiting  She denies any shortness of breath or difficulty breathing  She denies any numbness/weakness/tingling in her extremities  She has no new complaints this morning        OBJECTIVE:     Meds/Allergies     Current Facility-Administered Medications:     acetaminophen (TYLENOL) tablet 975 mg, 975 mg, Oral, Q8H [address], Chelsie Edouard PA-C, 975 mg at 09/04/21 0535    ALPRAZolam (XANAX) tablet 0 25 mg, 0 25 mg, Oral, BID PRN, Alber Khan DO    apixaban (ELIQUIS) tablet 10 mg, 10 mg, Oral, BID, Gina Haider, CRNP, 10 mg at 09/03/21 1738    [START ON 9/6/2021] apixaban (ELIQUIS) tablet 5 mg, 5 mg, Oral, BID, Gina Haider, CRNP    atorvastatin (LIPITOR) tablet 20 mg, 20 mg, Oral, After Dinner, Gina Haider, CRNP, 20 mg at 09/03/21 1737    dicyclomine (BENTYL) capsule 20 mg, 20 mg, Oral, 4x Daily PRN, Alber Kahn DO    docusate sodium (COLACE) capsule 100 mg, 100 mg, Oral, BID, Juanita Nina DO, 100 mg at 08/30/21 1730    gabapentin (NEURONTIN) capsule 100 mg, 100 mg, Oral, BID, Juanita Hakim, DO, 100 mg at 09/03/21 1738    lidocaine (LIDODERM) 5 % patch 2 patch, 2 patch, Topical, Daily, Chelsie Edouard PA-C, 2 patch at 09/03/21 0852    losartan (COZAAR) tablet 50 mg, 50 mg, Oral, Daily, Vinod Garcia MD, 50 mg at 09/03/21 0851    ondansetron (ZOFRAN) injection 4 mg, 4 mg, Intravenous, Q6H PRN, Jalyn Mathis MD    oxyCODONE (ROXICODONE) IR tablet 2 5 mg, 2 5 mg, Oral, Q4H PRN, Terri Templeton MD, 2 5 mg at 09/03/21 1113    pantoprazole (PROTONIX) EC tablet 40 mg, 40 mg, Oral, Early Morning, Juanita Hakim, DO, 40 mg at 09/04/21 0535    polyethylene glycol (MIRALAX) packet 17 g, 17 g, Oral, Daily, Juanita Haheavenlym, DO, 17 g at 08/30/21 0852    potassium chloride (K-DUR,KLOR-CON) CR tablet 40 mEq, 40 mEq, Oral, Once, Francesco Dominguez PA-C    sucralfate (CARAFATE) tablet 1 g, 1 g, Oral, 4x Daily (AC & HS), Juanita Hakim, DO, 1 g at 09/04/21 0535     Vitals:   Vitals:    09/04/21 0739   BP: 148/82   Pulse: 90   Resp: 18   Temp: 98 1 °F (36 7 °C)   SpO2: 95%       Intake/Output:  I/O       09/02 0701 - 09/03 0700 09/03 0701 - 09/04 0700 09/04 0701 - 09/05 0700    P  O  360 120     I V  (mL/kg)       Total Intake(mL/kg) 360 (7 2) 120 (2 4)     Urine (mL/kg/hr) 100 (0 1) 300 (0 3)     Stool 0      Total Output 100 300     Net +260 -180            Unmeasured Stool Occurrence 1 x             Nutrition/GI Proph/Bowel Reg: Regular diet with Ensure and Steve twice daily; Protonix and Carafate; MiraLax  Physical Exam:   GENERAL APPEARANCE: Patient in no acute distress  HEENT: NCAT; PERRL, EOMs intact; Mucous membranes moist  NECK / BACK:  Mild to moderate tenderness over the thoracolumbar spine without step-off or deformity  CV: Regular rate and rhythm; no murmur/gallops/rubs appreciated  CHEST / LUNGS: Clear to auscultation; no wheezes/rales/rhonci  ABD: NABS; soft; non-distended; non-tender  :  Voiding spontaneously  EXT: +2 pulses bilaterally upper & lower extremities    NEURO: GCS 15; no focal neurologic deficits; neurovascularly intact  SKIN: Warm, dry and well perfused; no rash; no jaundice  Invasive Devices     Peripherally Inserted Central Catheter Line            PICC Line 04/97/66 Right Basilic 7 days          Drain            External Urinary Catheter 7 days                 Lab Results:   Results: I have personally reviewed pertinent reports   , BMP/CMP:   Lab Results   Component Value Date    SODIUM 140 09/04/2021    K 3 5 09/04/2021     (H) 09/04/2021    CO2 23 09/04/2021    BUN 24 09/04/2021    CREATININE 0 60 09/04/2021    CALCIUM 8 4 09/04/2021    EGFR 81 09/04/2021    and CBC:   Lab Results   Component Value Date    WBC 10 88 (H) 09/04/2021    HGB 9 4 (L) 09/04/2021    HCT 29 0 (L) 09/04/2021     (H) 09/04/2021     09/04/2021    MCH 32 4 09/04/2021    MCHC 32 4 09/04/2021    RDW 15 8 (H) 09/04/2021    MPV 10 0 09/04/2021    NRBC 0 09/04/2021     Imaging/EKG Studies: Results: I have personally reviewed pertinent reports  Other Studies: N/A  VTE Prophylaxis:  Anticoagulated with Alexa Duarte PA-C  9/4/2021 07:03 AM

## 2021-09-04 NOTE — ASSESSMENT & PLAN NOTE
· Hold home Lipitor initially held due to kidney functioning  · Restarted home atorvastatin  · Outpatient follow-up with PCP

## 2021-09-05 LAB — PTH RELATED PROT SERPL-SCNC: <2 PMOL/L

## 2021-09-07 ENCOUNTER — TELEPHONE (OUTPATIENT)
Dept: HEMATOLOGY ONCOLOGY | Facility: CLINIC | Age: 86
End: 2021-09-07

## 2021-09-07 NOTE — TELEPHONE ENCOUNTER
Appointment Confirmation     Appointment with  Dr Aftab Block   Appointment date & time  09/16 at 1:00pm    Location [place]   Patient verbilized Understanding  yes

## 2021-09-08 ENCOUNTER — TELEPHONE (OUTPATIENT)
Dept: PALLIATIVE MEDICINE | Facility: CLINIC | Age: 86
End: 2021-09-08

## 2021-09-08 LAB
MISCELLANEOUS LAB TEST RESULT: NORMAL
MISCELLANEOUS LAB TEST RESULT: NORMAL

## 2021-09-08 NOTE — TELEPHONE ENCOUNTER
I reached out to patient's  to schedule a hospital follow up appointment, per  patient is now living at a nursing home permently  and will not be able to follow up  Palliative Care is a specialty that helps take care of pts with complex medical conditions, that are often life limiting  [facility] along with other specialists and Primary Care providers  We can offer help with:  -Creating a living will/ACP  -Establishing a POA  -Discussing Goals of Care    Palliative Care is not the same as Hospice  [facility] can be short term or long term as we offer symptom management related to advanced disease or side effects of tx  Common dx/sx: Active CA or symptoms from tx   -Pain   -N/V   -Diarrhea/Constipation   -Neuropathy   -Anxiety/Depression   -Sleeplessness   -Loss of Appetite    Severe Heart disease: Congestive heart failure, Coronary artery disease (dx must be reviewed by office nurse)   -Severe SOB   -Chest pain   -02 Dependence    Severe lung disease: COPD, pulmonary fibrosis, pulmonary hypertension   -Severe SOB   -Active limitations   -02 Dependence    Severe Kidney Disease: Stage 4 or 5, patient on dialysis or considering dialysis   -GOC -Advanced Directives   -5 wishes   -Living Will   -Health Care Proxy    Severe Liver disease   -Abdominal swelling   -SOB    Neurological disease: Dementia, Multiple Sclerosis (dx must be reviewed by nurse), Parkinson's Disease, ALS, or Sanaz Gehrig's Disease   -Tremors   -anxiety   -sleeplessness   -appetite    We will not address Chronic Pain ONLY Active Cancer Pain

## 2021-09-13 ENCOUNTER — TELEPHONE (OUTPATIENT)
Dept: NEUROSURGERY | Facility: CLINIC | Age: 86
End: 2021-09-13

## 2021-09-13 NOTE — TELEPHONE ENCOUNTER
Spoke w/  and he states that his wife is at [facility] and  he will not be bringing his wife to the office for her f/u with us because she has also been diagnosed with Multiple Myeloma  He states that she is in terrible pain and she can't be moved ( " he will not put her through that pain")  Patient was instructed to call  [facility] to let them know that he doesn't wan to f/u with us  I also called [facility] and spoke with Edward Lindsay ( nursing supervisor  I informed her that that  canceled the appt  She stated  " I don't think he understands the process"  So she asked if we could reschedule for next week and that they will be bringing her to his appt  Appt   Scheduled for 9/20/21 @ 11:00am

## 2021-09-16 ENCOUNTER — CONSULT (OUTPATIENT)
Dept: HEMATOLOGY ONCOLOGY | Facility: CLINIC | Age: 86
End: 2021-09-16
Payer: MEDICARE

## 2021-09-16 VITALS
DIASTOLIC BLOOD PRESSURE: 80 MMHG | HEART RATE: 102 BPM | OXYGEN SATURATION: 99 % | TEMPERATURE: 97.1 F | SYSTOLIC BLOOD PRESSURE: 142 MMHG | RESPIRATION RATE: 18 BRPM

## 2021-09-16 DIAGNOSIS — C90.00 MULTIPLE MYELOMA NOT HAVING ACHIEVED REMISSION (HCC): Primary | ICD-10-CM

## 2021-09-16 DIAGNOSIS — I82.4Z2 LOWER LEG DVT (DEEP VENOUS THROMBOEMBOLISM), ACUTE, LEFT (HCC): ICD-10-CM

## 2021-09-16 DIAGNOSIS — D64.9 ANEMIA, UNSPECIFIED TYPE: ICD-10-CM

## 2021-09-16 PROCEDURE — 99215 OFFICE O/P EST HI 40 MIN: CPT | Performed by: INTERNAL MEDICINE

## 2021-09-16 NOTE — PROGRESS NOTES
HEMATOLOGY / ONCOLOGY CLINIC NOTE    Primary Care Provider: Dwayne Graham MD  Referring Provider:    MRN: [id number]  : 10/15/1931    Reason for Encounter:  Chief Complaint   Patient presents with    Consult     [facility]         History of Hematology / Oncology Illness:     Gladys Horne is a 80 y o  female who came in  to establish care with oncology    1, smoldering multiple myeloma  - bone marrow biopsy in 2021:  Plasma Cell 13%;  M spike showed 1 4; free light chain ratio 6    2, left leg DVT/PE  - due to poor mobility, currently taking Eliquis no bleeding    3, anemia  - chronic, likely due to comorbidities/chronic disease  Hemoglobin 9 in 2021, on observation     4, lumbar spine compression fracture/at least osteopenia  - bone survey in 2021 did not show definitive lytic lesions  Assessment / Plan:     1  Multiple myeloma not having achieved remission ([facility] )  - had a long discussion with patient, may aware that this is a smoldering multiple myeloma, there is no evidence of end-organ damage  Given the M spike is less than 2 g and bone marrow biopsy showed less than 20% of plasma cell, free light chain ratio was less than 20, okay to continue monitor patient  Will check labs every 4-6 months          - Comprehensive metabolic panel; Future  - CBC and differential; Future  - Immunoglobulin free LT chains blood; Future  - Protein electrophoresis, serum; Future  - VAS lower limb venous duplex study, unilateral/limited; Future    2  Lower leg DVT (deep venous thromboembolism), acute, left (HCC)     - Continue Eliquis  Repeat a Doppler in 4 months and follow patient after    3  Anemia, unspecified type     - continue monitor          Made patient aware regarding side effects of chemotherapy, including but not limited to fatigue cytopenia, increased risk for infection, potential kidney, liver injuries neuropathies et al    Made patient aware to call MD or go to ED for any fever,  Chills, bleeding, easy bruise, unhealed wound, chest pain, abdominal pain et al                       minutes were spent face to face with patient with greater than 50% of the time spent in counseling or coordination of care including discussions of treatment instructions  All of the patient's questions were answered to their satisfactory during this discussion  Advised pt to call if there is any further questions  Interval History:      9/16/2021 : In year old female, has history of hypertension CKD, smoker multiple myeloma, anemia, newly diagnosed DVT came in for follow-up  Patient was recently hospitalized  Overall patient doing well, she is tolerating physical therapy, no new bone pain  Leg swelling appears to be slightly better    Problem list:     Patient Active Problem List   Diagnosis    Chronic kidney disease, stage 3 unspecified ([facility] )    Current mild episode of major depressive disorder without prior episode ([facility] )    Essential hypertension with goal blood pressure less than 140/90    Hyperlipidemia    Closed wedge compression fractures of T7, T8 vertebra (HCC)    Closed compression fractures of L3, L5 lumbar vertebra, initial encounter ([place] [id number] )    Lower leg DVT (deep venous thromboembolism), [facility] ([facility])    Hypercalcemia    Syncope    Pulmonary embolism ([facility])    Hypokalemia    Multiple myeloma not having achieved remission ([facility])         PHYSICIAL EXAMINATION:     Vital Signs:   /80   Pulse 102   Temp (!) 97 1 °F (36 2 °C)   Resp 18   SpO2 99%   There is no height or weight on file to calculate BSA     Ht Readings from Last 3 Encounters:   08/28/21 4' 10" (1 473 m)   08/02/21 4' 10" (1 473 m)   07/12/21 4' 9" (1 448 m)       Wt Readings from Last 3 Encounters:   08/28/21 49 9 kg (110 lb 0 2 oz)   08/02/21 49 9 kg (110 lb)   06/07/21 53 5 kg (117 lb 15 1 oz)        Temp Readings from Last 3 Encounters:   09/16/21 (!) 97 1 °F (36 2 °C)   09/04/21 98 5 °F (36 9 °C)   08/25/21 98 4 °F (36 9 °C) (Oral)        BP Readings from Last 3 Encounters:   09/16/21 142/80   09/04/21 135/79   08/26/21 134/75         Pulse Readings from Last 3 Encounters:   09/16/21 102   09/04/21 94   08/26/21 87         Appears comfortable, on wheelchair, wearing a brace, left lower extremity swelling noticed, consistent with DVT, positive P edema, no other major finding examination       GEN: Alert, awake oriented x3, in no acute distress  HEENT- No pallor, icterus, cyanosis, no oral mucosal lesions,   LAD - no palpable cervical, clavicle, axillary, inguinal LAD  Heart- normal S1 S2, regular rate and rhythm, No murmur, rubs  Lungs- decreased breathing sound bilateral    Abdomen- soft, Non tender, bowel sounds present  Extremities- No cyanosis, clubbing, edema  Neuro- No focal neurological deficit           PAST MEDICAL HISTORY:   has a past medical history of Chronic pain, Diverticulitis, GERD (gastroesophageal reflux disease), Hypertension, Shingles, and Shortness of breath  PAST SURGICAL HISTORY:   has a past surgical history that includes Cholecystectomy; Appendectomy; Incision and drainage retrophyaryngeal abcess; Total hip arthroplasty; Pelvic fracture surgery; Joint replacement; and IR biopsy bone marrow (8/31/2021)      CURRENT MEDICATIONS:   Current Outpatient Medications   Medication Sig Dispense Refill    acetaminophen (TYLENOL) 325 mg tablet Take 3 tablets (975 mg total) by mouth every 8 (eight) hours  0    ALPRAZolam (XANAX) 0 25 mg tablet Take 1 tablet (0 25 mg total) by mouth 2 (two) times a day as needed for anxiety 6 tablet 0    apixaban (ELIQUIS) 5 mg Take 1 tablet (5 mg total) by mouth 2 (two) times a day  0    atorvastatin (LIPITOR) 20 mg tablet 1 by mouth daily      dicyclomine (BENTYL) 20 mg tablet TAKE 1 TABLET BY MOUTH EVERY 6 HOURS AS NEEDED (ABDOMINAL PAIN) 360 tablet 1    docusate sodium (COLACE) 100 mg capsule Take 1 capsule (100 mg total) by mouth 2 (two) times a day as needed for constipation  0    gabapentin (NEURONTIN) 100 mg capsule Take 100 mg by mouth 2 (two) times a day      lidocaine (LIDODERM) 5 % Apply 2 patches topically daily Remove & Discard patch within 12 hours or as directed by MD  0    losartan (COZAAR) 50 mg tablet Take 50 mg by mouth daily      oxyCODONE (ROXICODONE) 5 mg immediate release tablet Take 0 5 tablets (2 5 mg total) by mouth every 4 (four) hours as needed for moderate pain or severe painMax Daily Amount: 15 mg 10 tablet 0    pantoprazole (PROTONIX) 40 mg tablet       polyethylene glycol (MIRALAX) 17 g packet Take 17 g by mouth daily 30 each 2    potassium chloride (Klor-Con) 10 mEq tablet Take 10 mEq by mouth daily      sucralfate (CARAFATE) 1 g tablet TAKE 1 TAB BY MOUTH 4 TIMES A DAY BEFORE MEALS AND AT BEDTIME   apixaban (ELIQUIS) 5 mg Take 2 tablets (10 mg total) by mouth 2 (two) times a day for 2 doses 4 tablet 0     No current facility-administered medications for this visit  [unfilled]    SOCIAL HISTORY:   reports that she has never smoked  She has never used smokeless tobacco  She reports that she does not drink alcohol and does not use drugs  FAMILY HISTORY:  family history includes Stroke in her mother  ALLERGIES:  is allergic to aloe, banana - food allergy, naproxen, and oxycodone-acetaminophen  REVIEW OF SYSTEMS:  Please note that a 14-point review of systems was performed to include Constitutional, HEENT, Respiratory, CVS, GI, , Musculoskeletal, Integumentary, Neurologic, Rheumatologic, Endocrinologic, Psychiatric, Lymphatic, and Hematologic/Oncologic systems were reviewed and are negative unless otherwise stated in HPI  Positive and negative findings pertinent to this evaluation are incorporated into the history of present illness              LAB:  Lab Results   Component Value Date    WBC 10 88 (H) 09/04/2021    HGB 9 4 (L) 09/04/2021    HCT 29 0 (L) 09/04/2021     (H) 09/04/2021     09/04/2021     Lab Results   Component Value Date    SODIUM 140 2021    K 3 5 2021     (H) 2021    CO2 23 2021    AGAP 5 2021    BUN 24 2021    CREATININE 0 60 2021    GLUC 91 2021    GLUF 72 2021    CALCIUM 8 4 2021    AST 19 2021    ALT 12 2021    ALKPHOS 181 (H) 2021    TP 7 1 2021    TBILI 0 75 2021    EGFR 81 2021       CBC with diff:       Invalid input(s):  RBC, TOTALCELLSCOUNTED, SEGS%, GRANS%, LYMPHS%, EOS%, BASO%, ABNEUT, ABGRANS, ABLYMPHS, ABMOMOS, ABEOS, ABBASO    CMP:      Invalid input(s): ALBUMIN    IMAGING:  VAS lower limb venous duplex study, unilateral/limited    (Results Pending)     Echo complete with contrast if indicated    Result Date: 2021  Narrative: [facility] [address] [place] [address] [phone] Transthoracic Echocardiogram 2D, M-mode, Doppler, and Color Doppler Study date:  26-Aug-2021 Patient: Sparkle Hoyt MR number: [id number] Account number: [de-identified] : 15-Oct-1931 Age: 80 years Gender: Female Status: Outpatient Location: Emergency room Height: 58 in Weight: 109 8 lb BP: 138/ 63 mmHg Indications: Syncope Diagnoses: R55  - Syncope and collapse Sonographer:  Arron Valverde RDCS Primary Physician:  Rafael Petersen MD Referring Physician:  Chula Martinez DO Group:  [id number] Cardiology Fellow: Darion Fall MD Interpreting Physician:  Ezequiel Rae MD SUMMARY PROCEDURE INFORMATION: This was a technically difficult study  LEFT VENTRICLE: Systolic function was normal  Ejection fraction was estimated to be 60 %  There were no regional wall motion abnormalities  MITRAL VALVE: There was moderate annular calcification  There was mild to moderate regurgitation  TRICUSPID VALVE: There was trace regurgitation  PULMONIC VALVE: There was trace regurgitation   HISTORY: PRIOR HISTORY: Syncope PROCEDURE: The procedure was performed in the emergency room  This was a routine study  The transthoracic approach was used  The study included complete 2D imaging, M-mode, complete spectral Doppler, and color Doppler  The heart rate was 84 bpm, at the start of the study  Images were obtained from the parasternal, apical, subcostal, and suprasternal notch acoustic windows  Echocardiographic views were limited due to restricted patient mobility, poor patient compliance, and lung interference  This was a technically difficult study  LEFT VENTRICLE: Size was normal  Systolic function was normal  Ejection fraction was estimated to be 60 %  There were no regional wall motion abnormalities  Wall thickness was normal  No evidence of apical thrombus  DOPPLER: Left ventricular diastolic function parameters were normal  RIGHT VENTRICLE: The size was normal  Systolic function was normal  Wall thickness was normal  LEFT ATRIUM: Size was normal  RIGHT ATRIUM: Size was normal  MITRAL VALVE: There was moderate annular calcification  Valve structure was normal  There was normal leaflet separation  DOPPLER: The transmitral velocity was within the normal range  There was no evidence for stenosis  There was mild to moderate regurgitation  AORTIC VALVE: The valve was trileaflet  Leaflets exhibited mildly increased thickness, mild calcification, and normal cuspal separation  DOPPLER: Transaortic velocity was within the normal range  There was no evidence for stenosis  There was no significant regurgitation  TRICUSPID VALVE: Not well visualized  DOPPLER: The transtricuspid velocity was within the normal range  There was no evidence for stenosis  There was trace regurgitation  The tricuspid jet envelope definition was inadequate for estimation of RV systolic pressure  There are no indirect findings which would suggest moderate or severe pulmonary hypertension  PULMONIC VALVE: Not well visualized  DOPPLER: The transpulmonic velocity was within the normal range   There was trace regurgitation  PERICARDIUM: There was no pericardial effusion  The pericardium was normal in appearance  AORTA: The root exhibited normal size  SYSTEMIC VEINS: IVC: The inferior vena cava was normal in size  Respirophasic changes were normal  SYSTEM MEASUREMENT TABLES 2D %FS: 32 68 % Ao Diam: 2 83 cm Ao asc: 3 08 cm EDV(Teich): 31 55 ml EF(Teich): 62 88 % ESV(Teich): 11 71 ml IVSd: 1 39 cm LA Diam: 2 52 cm LAAs A4C: 7 74 cm2 LAESV A-L A4C: 14 13 ml LAESV MOD A4C: 13 63 ml LALs A4C: 3 6 cm LVEDV MOD A4C: 20 52 ml LVEF MOD A4C: 55 35 % LVESV MOD A4C: 9 16 ml LVIDd: 2 88 cm LVIDs: 1 94 cm LVLd A4C: 5 26 cm LVLs A4C: 4 46 cm LVPWd: 0 69 cm RAEDV A-L: 11 24 ml RAEDV MOD: 10 47 ml RALd: 3 75 cm RVIDd: 3 04 cm SV MOD A4C: 11 36 ml SV(Teich): 19 84 ml CW AV Env  Ti: 233 68 ms AV VTI: 18 64 cm AV Vmax: 1 18 m/s AV Vmean: 0 8 m/s AV maxP 61 mmHg AV meanP 95 mmHg MM TAPSE: 1 41 cm PW LVOT Env  Ti: 238 25 ms LVOT VTI: 15 86 cm LVOT Vmax: 1 m/s LVOT Vmean: 0 67 m/s LVOT maxPG: 3 98 mmHg LVOT meanP 05 mmHg MV A Rajesh: 1 11 m/s MV Dec Reynolds: 2 53 m/s2 MV DecT: 200 61 ms MV E Rajesh: 0 51 m/s MV E/A Ratio: 0 46 MV PHT: 58 18 ms MVA By PHT: 3 78 cm2 [employer] Accredited Echocardiography Laboratory Prepared and electronically signed by Sklyer Valdivia MD Signed 26-Aug-2021 15:56:33     CT chest abdomen pelvis wo contrast    Result Date: 2021  Narrative: CT CHEST, ABDOMEN AND PELVIS WITHOUT IV CONTRAST INDICATION:   chronic abdominal pain, T spine and L spine tenderness post syncopal episode  COMPARISON:  Compared with CT of 2021 TECHNIQUE: CT examination of the chest, abdomen and pelvis was performed without intravenous contrast   Axial, sagittal, and coronal 2D reformatted images were created from the source data and submitted for interpretation  Radiation dose length product (DLP) for this visit:  743 04 mGy-cm     This examination, like all CT scans performed in the [facility], was performed utilizing techniques to minimize radiation dose exposure, including the use of iterative  reconstruction and automated exposure control  Enteric contrast was administered  FINDINGS: CHEST LUNGS:  Lungs are clear  There is no tracheal or endobronchial lesion  PLEURA:  Unremarkable  HEART/GREAT VESSELS: Dilated ascending thoracic aorta measuring 3 5 cm  Unremarkable for patient's age  MEDIASTINUM AND LINDSEY:  Unremarkable  CHEST WALL AND LOWER NECK:   Unremarkable  ABDOMEN LIVER/BILIARY TREE:  Unremarkable  GALLBLADDER:  Gallbladder is surgically absent  SPLEEN:  Unremarkable  PANCREAS:  Unremarkable  ADRENAL GLANDS:  Unremarkable  KIDNEYS/URETERS:  2 mm right kidney midpole calculus in left kidney midpole calculus    No hydronephrosis  STOMACH AND BOWEL:  Stool impacted rectosigmoid colon  Sigmoid diverticulosis  APPENDIX:  There are expected postoperative changes of appendectomy  ABDOMINOPELVIC CAVITY:  No ascites  No pneumoperitoneum  No lymphadenopathy  VESSELS:  Unremarkable for patient's age  PELVIS REPRODUCTIVE ORGANS:  Normal uterine like structure not appreciated  URINARY BLADDER:  Unremarkable  ABDOMINAL WALL/INGUINAL REGIONS:  Unremarkable  OSSEOUS STRUCTURES: Loss of height, sclerosis along the superior endplate of L3, L5 is new  Anterolisthesis of L5 over lumbarised S1 is unchanged  Wedge fracture deformity of T7 and T8  This appears new from portable x-ray of 6/7/2021 No retropulsion  Posterior columns are intact  Streak artifacts from right hip prosthesis  Impression: Moderate wedge fracture deformity of T7, L3 and L5 and severe wedge deformity of T8 appears new  Stool impacted rectosigmoid colon   Workstation performed: [id number]     XR spine thoracic 3 vw    Addendum Date: 8/31/2021 Addendum:   ADDENDUM:  I personally discussed this study with Ilda Lennox on 8/31/2021 at 12:31 PM      Addendum Date: 8/31/2021 Addendum:   ADDENDUM: Please note that based on the description of labeling of the lumbar spine on concurrent lumbar spine radiograph from the same day the midthoracic vertebral bodies will be located at the T6-T7 level which are grossly stable in appearance  The labeling therefore represents a discrepancy from the prior CT 8/25/2021  Result Date: 8/31/2021  Narrative: THORACIC SPINE INDICATION:   Thoracic vertebral body fractures    COMPARISON:  CT from 8/25/2021 VIEWS:  XR SPINE THORACIC 3 VW FINDINGS: For the purposes of this dictation the labeling of the spine will remain in accordance with the prior examination  The bones are osteopenic  There are stable previously described mid thoracic vertebral body fractures  Is no significant spinal listhesis  Multilevel disc space narrowing  There is no displacement of the paraspinal line  The pedicles appear intact  Impression: Diffuse osteopenia limiting evaluation  Stable appearance of previously described mid thoracic vertebral body fractures  Please refer to prior report for description of labeling of the vertebral bodies  Workstation performed: [id number]     XR spine lumbar 2 or 3 views injury    Result Date: 8/31/2021  Narrative: LUMBAR SPINE INDICATION:   Lumbar compression fractures    COMPARISON:  None VIEWS:  XR SPINE LUMBAR 2 OR 3 VIEWS INJURY FINDINGS: Please note that there is a discrepancy in labeling of the spine since the prior examination, however concordant with labeling performed 6/7/2021  For the purposes of this dictation there is grade 1 anterolisthesis of the designated L4-L5 level  There is stable chronic loss of height of the L1 vertebral body with a Schmorl's node along the superior endplate  There is also stable loss of height of the L2 and L4 vertebral bodies allowing for limited evaluation secondary the presence of osteopenia  There is levoscoliosis  No significant lumbar degenerative change noted  Multilevel facet arthrosis  Soft tissues are unremarkable       Impression: Please note that labeling of the lumbar spine will remain in accordance with the examination performed on 6/7/2021 however discrepant from labeling performed on 8/25/2021 CT  Osteopenia limits evaluation with grossly stable loss of height of lumbar vertebral bodies as described above  Workstation performed: [id number]     XR bone survey complete >12 mos    Result Date: 9/3/2021  Narrative: BONE SURVEY INDICATION: "evaluate for multiple myeloma; complete staging   " COMPARISON:  None VIEWS:  CT chest abdomen and pelvis, 8/25/2021 FINDINGS: SKULL (LATERAL VIEW):  No definite abnormality CERVICAL SPINE (LATERAL VIEW): Bony osteopenia and discogenic degenerative changes  No focal abnormality HUMERI (AP VIEW):   Diffuse bony osteopenia  No focal osteolytic process  THORACIC SPINE (AP LATERAL VIEWS):   Compression deformities of the thoracic spine appear similar to the prior CT study  There is diffuse bony osteopenia RIBS:  Several right-sided subacute rib fractures  Diffuse bony osteopenia  LUMBAR SPINE (AP LATERAL VIEWS):   Multiple compression fractures  No change in height compared to prior study  No new fracture  Bony osteopenia  In keeping with numbering as described on previous study, anterolisthesis of L5 upon the lumbralized S1 segment  PELVIS:   There is a right hip prosthesis, nonacute fracture of the right inferior ramus, asymmetric transitional vertebra, and diffuse bony osteopenia  FEMORA (AP VIEW): Bony osteopenia and bilateral knee arthritis and chondrocalcinosis TIBIA-FIBULA (AP VIEW):   Unremarkable  Impression: 1  Multiple spinal compression fractures, similar to the prior CT study of 8/25/2021 2  Subacute right rib fractures also noted  3  Diffuse marked degree of bony osteopenia 4  No focal osteolytic destructive process Workstation performed: [id number]     CT head without contrast    Result Date: 8/25/2021  Narrative: CT BRAIN - WITHOUT CONTRAST INDICATION:   syncope, unknown head strike   COMPARISON: None  TECHNIQUE:  CT examination of the brain was performed  In addition to axial images, sagittal and coronal 2D reformatted images were created and submitted for interpretation  Radiation dose length product (DLP) for this visit:  805 mGy-cm   This examination, like all CT scans performed in the [facility], was performed utilizing techniques to minimize radiation dose exposure, including the use of iterative reconstruction and automated exposure control  IMAGE QUALITY:  Diagnostic  FINDINGS: PARENCHYMA: Decreased attenuation is noted in periventricular and subcortical white matter demonstrating an appearance that is statistically most likely to represent moderate microangiopathic change  No CT signs of acute infarction  No intracranial mass, mass effect or midline shift  No acute parenchymal hemorrhage  VENTRICLES AND EXTRA-AXIAL SPACES:  Ventricles and extra-axial CSF spaces are prominent commensurate with the degree of volume loss  No hydrocephalus  No acute extra-axial hemorrhage  VISUALIZED ORBITS AND PARANASAL SINUSES:  Unremarkable  CALVARIUM AND EXTRACRANIAL SOFT TISSUES:  Normal      Impression: No acute intracranial abnormality  Workstation performed: [id number]     CT spine cervical without contrast    Result Date: 8/25/2021  Narrative: CT CERVICAL SPINE - WITHOUT CONTRAST INDICATION:   syncope, unknown head strike  COMPARISON:  None  TECHNIQUE:  CT examination of the cervical spine was performed without intravenous contrast   Contiguous axial images were obtained  Sagittal and coronal reconstructions were performed  Radiation dose length product (DLP) for this visit:  385 mGy-cm   This examination, like all CT scans performed in the [facility], was performed utilizing techniques to minimize radiation dose exposure, including the use of iterative reconstruction and automated exposure control  IMAGE QUALITY:  Diagnostic   FINDINGS: ALIGNMENT:  Normal alignment of the cervical spine  No subluxation  VERTEBRAL BODIES:  No fracture  DEGENERATIVE CHANGES:  Degenerative disc changes at multiple levels from C3 to C7 PREVERTEBRAL AND PARASPINAL SOFT TISSUES:  Unremarkable  THORACIC INLET:  Normal      Impression: No cervical spine fracture or traumatic malalignment  Degenerative disc changes from C3 to C7  Workstation performed: [id number]     CTA chest pe study    Result Date: 8/26/2021  Narrative: CTA - CHEST WITH IV CONTRAST - PULMONARY ANGIOGRAM INDICATION:   PE suspected, high pretest prob DVT in LLE  COMPARISON: Unenhanced CT from August 25, 2021  TECHNIQUE: CTA examination of the chest was performed using angiographic technique according to a protocol specifically tailored to evaluate for pulmonary embolism  Axial, sagittal, and coronal 2D reformatted images were created from the source data and  submitted for interpretation  In addition, coronal 3D MIP postprocessing was performed on the acquisition scanner  Radiation dose length product (DLP) for this visit:  248 35 mGy-cm   This examination, like all CT scans performed in the [facility], was performed utilizing techniques to minimize radiation dose exposure, including the use of iterative  reconstruction and automated exposure control  IV Contrast:  56 mL of iodixanol (VISIPAQUE)  FINDINGS: PULMONARY ARTERIAL TREE:  Acute lobar and segmental thromboemboli in the right lower lobe  LUNGS:  Mild subsegmental atelectasis in both lower lobes, right middle lobe and the lingula  Lungs otherwise clear  PLEURA:  Unremarkable  HEART/GREAT VESSELS:  Unremarkable for patient's age  RV/LV ratio: 0 92  MEDIASTINUM AND LINDSEY: No lymphadenopathy or mass  Small hiatal hernia  Esophagus unremarkable  Trachea and main stem bronchi normal  CHEST WALL AND LOWER NECK:   Unremarkable  VISUALIZED STRUCTURES IN THE UPPER ABDOMEN:  Unremarkable  OSSEOUS STRUCTURES:  Compression fractures of T7 and T8 8 again demonstrated  No new fractures  Impression: Lobar and segmental pulmonary emboli in the right lower lobe  The calculated ratio of right ventricular to left ventricular diameter (RV/LV ratio) is 0 92  This is greater than 0 9, which is abnormal and suggests right heart strain  An abnormal RV/LV ratio has been shown to be associated with an increased risk of 30 day mortality in the setting of acute pulmonary embolism  Urgent consultation with the medical critical care team is recommended  I personally discussed this study with Crystal Conley on 8/26/2021 at 8:56 PM  Workstation performed: [id number]     CT recon only thoracolumbar (No Charge)    Result Date: 8/25/2021  Narrative: CT THORACIC AND LUMBAR SPINE INDICATION:   back pain post syncope  COMPARISON:  Compared with CT abdomen of 6/7/2021 and chest x-ray of 6/7/2021  TECHNIQUE: Axial CT examination of the thoracic and lumbar spine was obtained utilizing reconstructed images from CT of the chest abdomen and pelvis performed the same day  Images were reformatted in the sagittal and coronal planes  This examination, like all CT scans performed in the [facility], was performed utilizing techniques to minimize radiation dose exposure, including the use of iterative reconstruction and automated exposure control  FINDINGS: ALIGNMENT: Normal alignment of lumbar vertebral bodies  No subluxation  VERTEBRAL BODIES: Loss of height, sclerosis along the superior endplate of L3, L5 is new  Anterolisthesis of L5 over lumbarised S1 is unchanged  Moderate right fracture deformity of T7 and and severe wedge deformity of T8  This appears new from portable x-ray of 6/7/2021  No retropulsion  Posterior columns are intact  DEGENERATIVE CHANGES: Anterolisthesis of L5 over S1  PREVERTEBRAL AND PARASPINAL SOFT TISSUES: Normal      Impression: New moderate wedge fracture deformities of T7, L3, L5 with severe fracture deformity of T8  No retropulsion   Workstation performed: [id number]     VAS lower limb venous duplex study, unilateral/limited    Result Date: 8/26/2021  Narrative:  [facility] REPORT [facility]INICAL: Indications: Patient presents with Left lower extremity swelling since recent fall  Operative History: Patient denies previous cardiovascular surgeries Risk Factors The patient has history of HTN  She has no history of Hyperlipidemia  FINDINGS:  Left        Impression          CFV         Thrombosed (acute)  PFV         Thrombosed (acute)  FV Prox     Thrombosed (acute)  FV Mid      Thrombosed (acute)  FV Dist     Thrombosed (acute)  Popliteal   Thrombosed (acute)  PostTibial  Thrombosed (acute)     CONCLUSION: Impression: RIGHT LOWER LIMB LIMITED: Evaluation shows no evidence of thrombus in the common femoral vein  Doppler evaluation shows a normal response to augmentation maneuvers  LEFT LOWER LIMB: Acute occlusive thrombus noted in the paired posterior tibial veins extending through to the common femoral vein  No evidence of superficial thrombophlebitis noted  Doppler evaluation shows a normal response to augmentation maneuvers  Popliteal, posterior tibial and anterior tibial arterial Doppler waveforms are biphasic  Technical findings were given to Dr Mecca Brothers via [employer]  SIGNATURE: Electronically Signed by: Dafne Blue MD, [address] on 2021-08-26 11:52:04 PM    IR biopsy bone marrow    Result Date: 8/31/2021  Narrative: IMAGE-GUIDED BONE MARROW BIOPSY Indication: Multiple myeloma Procedure and Findings: The procedure was performed in the prone position  1% lidocaine was used for local anesthetic and conscious sedation was administered  The right posterior inferior iliac spine was localized by CT and the low back was prepped and draped in sterile fashion  Using CT guidance, an 11g bone marrow needle was advanced through the cortex of the iliac spine into the marrow  Aspiration was performed and submitted to pathology for slide preparation    Diagnostic marrow cells were identified preliminarily  The needle was slightly withdrawn and redirected to obtain a core biopsy using the [employer] system  The core was submitted to pathology  The puncture site was cleansed and a dressing was applied  This examination, like all CT scans performed in the [facility], was performed utilizing techniques to minimize radiation dose exposure, including the use of iterative reconstruction and automated exposure control   Sedation (min) : 15     Impression: Impression: Technically successful image guided bone marrow aspiration and core biopsy  Workstation performed: [id number]

## 2021-09-17 ENCOUNTER — HOSPITAL ENCOUNTER (OUTPATIENT)
Dept: RADIOLOGY | Facility: HOSPITAL | Age: 86
Discharge: HOME | End: 2021-09-17
Payer: COMMERCIAL

## 2021-09-17 ENCOUNTER — OFFICE VISIT (OUTPATIENT)
Dept: NEUROSURGERY | Facility: CLINIC | Age: 86
End: 2021-09-17
Payer: MEDICARE

## 2021-09-17 DIAGNOSIS — S22.060A COMPRESSION FRACTURE OF T8 VERTEBRA, INITIAL ENCOUNTER (HCC): ICD-10-CM

## 2021-09-17 DIAGNOSIS — S22.060A COMPRESSION FRACTURE OF T7 VERTEBRA, INITIAL ENCOUNTER (HCC): ICD-10-CM

## 2021-09-17 DIAGNOSIS — S32.030A COMPRESSION FRACTURE OF L3 VERTEBRA, INITIAL ENCOUNTER (HCC): ICD-10-CM

## 2021-09-17 DIAGNOSIS — S22.069A CLOSED T7 FRACTURE (HCC): Primary | ICD-10-CM

## 2021-09-17 DIAGNOSIS — S32.050A COMPRESSION FRACTURE OF L5 VERTEBRA, INITIAL ENCOUNTER (HCC): ICD-10-CM

## 2021-09-17 DIAGNOSIS — S22.039A CLOSED T3 FRACTURE (HCC): ICD-10-CM

## 2021-09-17 PROCEDURE — 99213 OFFICE O/P EST LOW 20 MIN: CPT | Performed by: PHYSICIAN ASSISTANT

## 2021-09-17 PROCEDURE — 72080 X-RAY EXAM THORACOLMB 2/> VW: CPT

## 2021-09-17 NOTE — PROGRESS NOTES
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, hypertension, chronic kidney disease, major depressive disorder, multiple myeloma here today for 3 weeks follow-up of traumatic T7, T8, L3 & L5 compression deformities  Patient wearing TLSO brace  Had upright thoracolumbar spine x-rays, only T8 compression deformity visible on the images, other fractures difficult to view  T8 collapse id wedge compression fracture appears stable  Official report pending  Patient reports mild to moderate back pain, arm seated with movement and certain activities  She takes Tylenol and oxycodone  Has chronic left leg gross swelling  Patient denies any radicular symptoms, numbness, weakness or paresthesia and extremities  Left leg heaviness and difficulty lifting  No bowel/bladder dysfunction or saddle anesthesia  Doing physical therapy  Exam- A&Ox3, communicates well  Laxmi  Finger to nose test normal and without drift bilaterally, strength left leg slightly limited hip flexion-grossly swollen and edematous, otherwise can move  Moderate Tenderness in the thoracic and Lumbar spine on palpation  TLSO on     Hx, PEx and images reviewed with the patient  Management plan discussed  Follow-up upright thoracic and lumbar spine x-rays in brace ordered placed/in 4 weeks  Advised to continue wearing TLSO brace when upright and at 45 degree head of bed  Continue physical therapy  Fall precaution, avoid lifting heavy objects, excessive bending or twisting  Questions and concerns were answered  Patient expressed their un understandings and agreed with the plan  Plan:  1  Follow-up Upright thoracic spine x-rays in TLSO 4 weeks  2  Upright lumbar spine x-rays in TLSO 4 weeks  3  Continue wearing TLSO brace when upright and at 45 degree head of bed  4  Continue physical therapy  5   Fall precaution, avoid lifting heavy objects, excessive bending or twisting  6  Continue Tylenol p r n  for pain  7  Follow-up in 4 weeks      Subjective/Objective     C/C: " 3 weeks F/U for T7,8 and L3 &5 compression"        HPI  Patient is 80 yrs old pleasant lady brought in wheel chair from rehab with 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, hypertension, chronic kidney disease, major depressive disorder, multiple myeloma here today for 3 weeks follow-up of traumatic T7, T8, L3 & L5 compression deformities  Patient had 3 weeks follow-up thoracolumbar spine x-rays in brace, only T8 compression fracture appears visible and appears stable  Patient reports mild to moderate back pain specially with movement  She is wearing TLSO brace and doing physical therapy  Takes Tylenol and gabapentin for pain  Denies radicular pain in the lower extremities, numbness, or paresthesia  Reports swelling of left leg with difficulty lifting during physical therapy and she feels she is weak in the leg and shaking-limiting her walking  No bowel/bladder dysfunction or saddle anesthesia  Denies fever, chills, rigors, cough or chest pain  ROS  Review of system personally reviewed and updated  Review of Systems   Constitutional: Negative  HENT: Negative  Eyes: Negative  Cardiovascular: Negative  Gastrointestinal: Negative  Musculoskeletal: Positive for back pain and gait problem (left leg sore- per pt healing)  Wearing TLSO brace    0/10 with medication  Skin: Negative  Allergic/Immunologic: Negative  Neurological: Negative for weakness, light-headedness and numbness  Hematological: Negative  Does not bruise/bleed easily  Psychiatric/Behavioral: Negative          Family History    Family History   Problem Relation Age of Onset    Stroke Mother        Social History    Social History     Socioeconomic History    Marital status: /Civil Union     Spouse name: Not on file    Number of children: Not on file  Years of education: Not on file    Highest education level: Not on file   Occupational History    Not on file   Tobacco Use    Smoking status: Never Smoker    Smokeless tobacco: Never Used   Vaping Use    Vaping Use: Never used   Substance and Sexual Activity    Alcohol use: Never    Drug use: Never    Sexual activity: Not Currently   Other Topics Concern    Not on file   Social History Narrative    Not on file     Social Determinants of Health     Financial Resource Strain:     Difficulty of Paying Living Expenses:    Food Insecurity:     Worried About Running Out of Food in the Last Year:     [address] in the Last Year:    Transportation Needs:     Lack of Transportation (Medical):      Lack of Transportation (Non-Medical):    Physical Activity:     Days of Exercise per Week:     Minutes of Exercise per Session:    Stress:     Feeling of Stress :    Social Connections:     Frequency of Communication with Friends and Family:     Frequency of Social Gatherings with Friends and Family:     Attends Sh[employer] Services:     Active Member of Clubs or Organizations:     Attends Club or Organization Meetings:     Marital Status:    Intimate Partner Violence:     Fear of Current or Ex-Partner:     Emotionally Abused:     Physically Abused:     Sexually Abused:        Past Medical History    Past Medical History:   Diagnosis Date    Chronic pain     Diverticulitis     GERD (gastroesophageal reflux disease)     Hypertension     Shingles     Shortness of breath        Surgical History    Past Surgical History:   Procedure Laterality Date    APPENDECTOMY      CHOLECYSTECTOMY      INCISION AND DRAINAGE RETROPHYARYNGEAL ABCESS      IR BIOPSY BONE MARROW  8/31/2021    JOINT REPLACEMENT      PELVIC FRACTURE SURGERY      TOTAL HIP ARTHROPLASTY         Medications      Current Outpatient Medications:     acetaminophen (TYLENOL) 325 mg tablet, Take 3 tablets (975 mg total) by mouth every 8 (eight) hours, Disp: , Rfl: 0    ALPRAZolam (XANAX) 0 25 mg tablet, Take 1 tablet (0 25 mg total) by mouth 2 (two) times a day as needed for anxiety, Disp: 6 tablet, Rfl: 0    apixaban (ELIQUIS) 5 mg, Take 2 tablets (10 mg total) by mouth 2 (two) times a day for 2 doses, Disp: 4 tablet, Rfl: 0    apixaban (ELIQUIS) 5 mg, Take 1 tablet (5 mg total) by mouth 2 (two) times a day, Disp: , Rfl: 0    atorvastatin (LIPITOR) 20 mg tablet, 1 by mouth daily, Disp: , Rfl:     dicyclomine (BENTYL) 20 mg tablet, TAKE 1 TABLET BY MOUTH EVERY 6 HOURS AS NEEDED (ABDOMINAL PAIN), Disp: 360 tablet, Rfl: 1    docusate sodium (COLACE) 100 mg capsule, Take 1 capsule (100 mg total) by mouth 2 (two) times a day as needed for constipation, Disp: , Rfl: 0    gabapentin (NEURONTIN) 100 mg capsule, Take 100 mg by mouth 2 (two) times a day, Disp: , Rfl:     lidocaine (LIDODERM) 5 %, Apply 2 patches topically daily Remove & Discard patch within 12 hours or as directed by MD, Disp: , Rfl: 0    losartan (COZAAR) 50 mg tablet, Take 50 mg by mouth daily, Disp: , Rfl:     oxyCODONE (ROXICODONE) 5 mg immediate release tablet, Take 0 5 tablets (2 5 mg total) by mouth every 4 (four) hours as needed for moderate pain or severe painMax Daily Amount: 15 mg, Disp: 10 tablet, Rfl: 0    pantoprazole (PROTONIX) 40 mg tablet, , Disp: , Rfl:     polyethylene glycol (MIRALAX) 17 g packet, Take 17 g by mouth daily, Disp: 30 each, Rfl: 2    potassium chloride (Klor-Con) 10 mEq tablet, Take 10 mEq by mouth daily, Disp: , Rfl:     sucralfate (CARAFATE) 1 g tablet, TAKE 1 TAB BY MOUTH 4 TIMES A DAY BEFORE MEALS AND AT BEDTIME , Disp: , Rfl:     Allergies    Allergies   Allergen Reactions    Aloe Other (See Comments)     Fresh fruit has reaction to   She can have canned fruit    Banana - Food Allergy Other (See Comments)     Itchy throat    Naproxen Diarrhea    Oxycodone-Acetaminophen Hives       The following portions of the patient's history were reviewed and updated as appropriate: allergies, current medications, past family history, past medical history, past social history, past surgical history and problem list     Investigations    I personally reviewed the XRAY results with the patient:    Findings as described in the assessment section above    Physical Exam    There were no vitals filed for this visit  Physical Exam  Constitutional:       Appearance: Normal appearance  HENT:      Head: Normocephalic and atraumatic  Eyes:      Extraocular Movements: Extraocular movements intact  Cardiovascular:      Rate and Rhythm: Normal rate and regular rhythm  Pulmonary:      Effort: Pulmonary effort is normal    Musculoskeletal:         General: Tenderness present  Cervical back: Normal range of motion  Right lower leg: Edema present  Left lower leg: Edema present  Neurological:      General: No focal deficit present  Mental Status: She is alert and oriented to person, place, and time  GCS: GCS eye subscore is 4  GCS verbal subscore is 5  GCS motor subscore is 6  Cranial Nerves: Cranial nerves are intact  Sensory: Sensation is intact  Motor: Weakness present  Deep Tendon Reflexes: Reflexes are normal and symmetric  Reflex Scores:       Tricep reflexes are 2+ on the right side and 2+ on the left side  Bicep reflexes are 2+ on the right side and 2+ on the left side  Brachioradialis reflexes are 2+ on the right side and 2+ on the left side  Patellar reflexes are 2+ on the right side and 2+ on the left side  Achilles reflexes are 2+ on the right side and 2+ on the left side  Comments: Slight flexion weakness of left hip due to growth swelling of left leg   Psychiatric:         Speech: Speech normal        Neurologic Exam     Mental Status   Oriented to person, place, and time     Speech: speech is normal   Level of consciousness: alert    Cranial Nerves     CN III, IV, VI   Right pupil: Size: 2 mm  Left pupil: Size: 2 mm     Nystagmus: none     CN XI   CN XI normal      Motor Exam   Muscle bulk: normal  Overall muscle tone: normal  Right arm tone: normal  Left arm tone: normal  Right arm pronator drift: absent  Left arm pronator drift: absent  Right leg tone: normal  Left leg tone: normal    Sensory Exam   Light touch normal      Gait, Coordination, and Reflexes     Reflexes   Right brachioradialis: 2+  Left brachioradialis: 2+  Right biceps: 2+  Left biceps: 2+  Right triceps: 2+  Left triceps: 2+  Right patellar: 2+  Left patellar: 2+  Right achilles: 2+  Left achilles: 2+  Right : 2+  Left : 2+  Right Chapin: absent  Left Chapin: absent  Right ankle clonus: absent  Left pendular knee jerk: absent

## 2021-10-12 ENCOUNTER — TELEPHONE (OUTPATIENT)
Dept: NEUROSURGERY | Facility: CLINIC | Age: 86
End: 2021-10-12
